# Patient Record
Sex: FEMALE | Race: WHITE | Employment: OTHER | URBAN - METROPOLITAN AREA
[De-identification: names, ages, dates, MRNs, and addresses within clinical notes are randomized per-mention and may not be internally consistent; named-entity substitution may affect disease eponyms.]

---

## 2018-04-17 LAB
CHOLESTEROL, TOTAL: NORMAL
CHOLESTEROL/HDL RATIO: NORMAL
HDLC SERPL-MCNC: NORMAL MG/DL
LDL CHOLESTEROL CALCULATED: NORMAL
TRIGL SERPL-MCNC: NORMAL MG/DL
TSH SERPL DL<=0.05 MIU/L-ACNC: NORMAL M[IU]/L
VLDLC SERPL CALC-MCNC: NORMAL MG/DL

## 2018-06-11 ENCOUNTER — OFFICE VISIT (OUTPATIENT)
Dept: CARDIOLOGY CLINIC | Age: 83
End: 2018-06-11
Payer: MEDICARE

## 2018-06-11 VITALS
SYSTOLIC BLOOD PRESSURE: 118 MMHG | WEIGHT: 206 LBS | HEART RATE: 93 BPM | DIASTOLIC BLOOD PRESSURE: 78 MMHG | BODY MASS INDEX: 32.33 KG/M2 | HEIGHT: 67 IN | RESPIRATION RATE: 16 BRPM

## 2018-06-11 DIAGNOSIS — I48.91 ATRIAL FIBRILLATION, UNSPECIFIED TYPE (HCC): Primary | ICD-10-CM

## 2018-06-11 PROCEDURE — G8400 PT W/DXA NO RESULTS DOC: HCPCS | Performed by: INTERNAL MEDICINE

## 2018-06-11 PROCEDURE — 1036F TOBACCO NON-USER: CPT | Performed by: INTERNAL MEDICINE

## 2018-06-11 PROCEDURE — 1123F ACP DISCUSS/DSCN MKR DOCD: CPT | Performed by: INTERNAL MEDICINE

## 2018-06-11 PROCEDURE — 1090F PRES/ABSN URINE INCON ASSESS: CPT | Performed by: INTERNAL MEDICINE

## 2018-06-11 PROCEDURE — 93000 ELECTROCARDIOGRAM COMPLETE: CPT | Performed by: INTERNAL MEDICINE

## 2018-06-11 PROCEDURE — G8417 CALC BMI ABV UP PARAM F/U: HCPCS | Performed by: INTERNAL MEDICINE

## 2018-06-11 PROCEDURE — 4040F PNEUMOC VAC/ADMIN/RCVD: CPT | Performed by: INTERNAL MEDICINE

## 2018-06-11 PROCEDURE — G8427 DOCREV CUR MEDS BY ELIG CLIN: HCPCS | Performed by: INTERNAL MEDICINE

## 2018-06-11 PROCEDURE — 99213 OFFICE O/P EST LOW 20 MIN: CPT | Performed by: INTERNAL MEDICINE

## 2018-06-11 RX ORDER — EZETIMIBE 10 MG/1
10 TABLET ORAL DAILY
COMMUNITY
End: 2019-05-14 | Stop reason: SDUPTHER

## 2018-06-11 RX ORDER — SIMVASTATIN 40 MG
40 TABLET ORAL NIGHTLY
COMMUNITY
End: 2019-05-14 | Stop reason: SDUPTHER

## 2018-07-31 RX ORDER — METOPROLOL TARTRATE 50 MG/1
75 TABLET, FILM COATED ORAL 2 TIMES DAILY
Qty: 270 TABLET | Refills: 3 | Status: SHIPPED | OUTPATIENT
Start: 2018-07-31 | End: 2019-05-14 | Stop reason: SDUPTHER

## 2019-04-04 LAB
AVERAGE GLUCOSE: NORMAL
CREATININE: 0.9 MG/DL
CREATININE: NORMAL MG/DL
HBA1C MFR BLD: 6 %
POTASSIUM (K+): 4.4

## 2019-04-25 VITALS
DIASTOLIC BLOOD PRESSURE: 76 MMHG | HEIGHT: 67 IN | SYSTOLIC BLOOD PRESSURE: 130 MMHG | BODY MASS INDEX: 32.02 KG/M2 | HEART RATE: 86 BPM | WEIGHT: 204 LBS

## 2019-05-13 PROBLEM — J30.1 SEASONAL ALLERGIC RHINITIS DUE TO POLLEN: Status: ACTIVE | Noted: 2019-05-13

## 2019-05-13 NOTE — PROGRESS NOTES
2019    Shae Velarde (:  1935) is a 82 y.o. female, here for evaluation of the following medical concerns: This is patient who had an extrinsic fall and fractured her left humerus. Patient since that time complaining of thoracic spine pain. She had this exact same discomfort the last time we did do a thoracic spine film indicating some narrowing and arthritic changes of the level of T8. This does match her level of discomfort. It's worse when she standing and she is stooping over the counter. She states it hurts so much that she's been unable to clock or even work at the counter because of pain. She denies pain radiating to the flank or into the abdominal area. Patient's blood pressure is well controlled. She does use chronic narcotic analgesics and very minimal amounts in order for her to do activities of daily living. The patient's atrial fibrillation is well controlled. Rate is well controlled. She is on Eliquis and has tolerated this medication well. He denies any bleeding or bruising but she does have a hematoma involving her left arm. This appears to be diminished in size compared to previous. No other bleeding or bruising. She denies any strokelike symptoms. Review of Systems   Constitutional: Negative. HENT: Negative. Eyes: Negative. Respiratory: Negative. Cardiovascular:        Atrial fibrillation without evidence of rapid ventricular response. Denies orthopnea PND or other symptoms consistent with congestive heart failure. Gastrointestinal: Negative. Endocrine: Negative. Glucose intolerance. Last hemoglobin A1c was 6.0. Genitourinary: Negative. Musculoskeletal:        Thoracic spine pain. Allergic/Immunologic: Negative. Neurological: Negative. Hematological: Negative. Psychiatric/Behavioral: Negative.       Problem List: Malaise and fatigue, Essential hypertension, Hyperlipidemia  Health Maintenance:  Colonoscopy - (2014)  Bone 7.5-325 MG per tablet Take 1 tablet by mouth every 6 hours as needed for Pain for up to 30 days. Yes Jerzy Goodrich MD   fluticasone (FLONASE) 50 MCG/ACT nasal spray 1 spray by Nasal route as needed. Yes Historical Provider, MD   Multiple Vitamins-Minerals (THERAPEUTIC MULTIVITAMIN-MINERALS) tablet Take 1 tablet by mouth daily. Yes Historical Provider, MD   Calcium Carb-Cholecalciferol (CALCIUM + D3) 600-200 MG-UNIT TABS Take 1 tablet by mouth daily.  Yes Historical Provider, MD        Allergies   Allergen Reactions    Amoxicillin-Pot Clavulanate     Crestor [Rosuvastatin] Other (See Comments)     Muscle fatigue    Lipitor [Atorvastatin] Other (See Comments)     Muscle fatigue    Sulfa Antibiotics Hives       Past Medical History:   Diagnosis Date    Anticoagulant long-term use     Atrial fibrillation ([facility] [address]     Hepatitis A     Hyperlipidemia     Hypertension     Spinal stenosis        Past Surgical History:   Procedure Laterality Date    [facility]      [facility]    APPENDECTOMY      CARDIOVERSION  10/28/2014    SPINE SURGERY  10/2014    TONSILLECTOMY         Social History     Socioeconomic History    Marital status:      Spouse name: Not on file    Number of children: Not on file    Years of education: Not on file    Highest education level: Not on file   Occupational History    Not on file   Social Needs    Financial resource strain: Not on file    Food insecurity:     Worry: Not on file     Inability: Not on file    Transportation needs:     Medical: Not on file     Non-medical: Not on file   Tobacco Use    Smoking status: Former Smoker     Packs/day: 1.00     Years: 15.00     Pack years: 15.00     Types: Cigarettes     Last attempt to quit: 1990     Years since quittin.5    Smokeless tobacco: Never Used   Substance and Sexual Activity    Alcohol use: Yes     Comment: rarely    Drug use: No    Sexual activity: Not on file   Lifestyle    Physical activity:     Days per week: Not on file     Minutes per session: Not on file    Stress: Not on file   Relationships    Social connections:     Talks on phone: Not on file     Gets together: Not on file     Attends Taoist service: Not on file     Active member of club or organization: Not on file     Attends meetings of clubs or organizations: Not on file     Relationship status: Not on file    Intimate partner violence:     Fear of current or ex partner: Not on file     Emotionally abused: Not on file     Physically abused: Not on file     Forced sexual activity: Not on file   Other Topics Concern    Not on file   Social History Narrative    Not on file        Family History   Problem Relation Age of Onset    Stroke Mother     Heart Disease Father        Vitals:    05/14/19 1358   BP: 136/74   Pulse: 88   SpO2: 95%   Weight: 206 lb (93.4 kg)   Height: 5' 8\" (1.727 m)     Estimated body mass index is 31.32 kg/m² as calculated from the following:    Height as of this encounter: 5' 8\" (1.727 m). Weight as of this encounter: 206 lb (93.4 kg). Physical Exam  Const: Appears healthy, well developed and well nourished. Appears obese. Eyes: EOMI in both eyes. PERRL. ENMT: External ears WNL. Tympanic membranes are intact. External nose WNL. Moistness and normal color of the  nasal mucosae. Septum is in the midline. Dentition is in good repair. Gums appear healthy. Posterior pharynx  shows no exudate, irritation or redness. Neck: Supple and symmetric. Palpation reveals no adenopathy. No masses appreciated. Thyroid: no nodule  appreciated or thyromegaly. No jugular venous distention. Resp: Respirations are unlabored. Respiration rate is normal. Auscultate good airflow. No rales, rhonchi or wheezes  appreciated over the lungs bilaterally. CV: Rhythm is regular. S1 is normal. S2 is normal. Carotids: no bruits. Abdominal aorta: not palpable. Pedal  pulses: 2+ and equal bilaterally.  Extremities: No clubbing,

## 2019-05-14 ENCOUNTER — OFFICE VISIT (OUTPATIENT)
Dept: FAMILY MEDICINE CLINIC | Age: 84
End: 2019-05-14
Payer: MEDICARE

## 2019-05-14 VITALS
WEIGHT: 206 LBS | SYSTOLIC BLOOD PRESSURE: 136 MMHG | HEIGHT: 68 IN | OXYGEN SATURATION: 95 % | HEART RATE: 88 BPM | DIASTOLIC BLOOD PRESSURE: 74 MMHG | BODY MASS INDEX: 31.22 KG/M2

## 2019-05-14 DIAGNOSIS — I48.20 CHRONIC ATRIAL FIBRILLATION (HCC): ICD-10-CM

## 2019-05-14 DIAGNOSIS — I10 ESSENTIAL HYPERTENSION: ICD-10-CM

## 2019-05-14 DIAGNOSIS — J30.1 SEASONAL ALLERGIC RHINITIS DUE TO POLLEN: ICD-10-CM

## 2019-05-14 DIAGNOSIS — M54.6 THORACIC SPINE PAIN: Primary | ICD-10-CM

## 2019-05-14 DIAGNOSIS — E78.2 MIXED HYPERLIPIDEMIA: ICD-10-CM

## 2019-05-14 PROCEDURE — 99214 OFFICE O/P EST MOD 30 MIN: CPT | Performed by: INTERNAL MEDICINE

## 2019-05-14 RX ORDER — SIMVASTATIN 40 MG
40 TABLET ORAL NIGHTLY
Qty: 90 TABLET | Refills: 1 | Status: SHIPPED | OUTPATIENT
Start: 2019-05-14 | End: 2019-12-13 | Stop reason: SDUPTHER

## 2019-05-14 RX ORDER — HYDROCODONE BITARTRATE AND ACETAMINOPHEN 7.5; 325 MG/1; MG/1
1 TABLET ORAL EVERY 6 HOURS PRN
Qty: 120 TABLET | Refills: 0 | Status: SHIPPED | OUTPATIENT
Start: 2019-05-14 | End: 2019-06-13

## 2019-05-14 RX ORDER — LISINOPRIL 40 MG/1
40 TABLET ORAL DAILY
Qty: 90 TABLET | Refills: 1 | Status: SHIPPED | OUTPATIENT
Start: 2019-05-14 | End: 2019-08-07 | Stop reason: SDUPTHER

## 2019-05-14 RX ORDER — EZETIMIBE 10 MG/1
10 TABLET ORAL DAILY
Qty: 90 TABLET | Refills: 1 | Status: SHIPPED | OUTPATIENT
Start: 2019-05-14 | End: 2019-11-20 | Stop reason: SDUPTHER

## 2019-05-14 RX ORDER — METOPROLOL TARTRATE 50 MG/1
75 TABLET, FILM COATED ORAL 2 TIMES DAILY
Qty: 270 TABLET | Refills: 3 | Status: SHIPPED | OUTPATIENT
Start: 2019-05-14 | End: 2019-07-24 | Stop reason: SDUPTHER

## 2019-05-14 ASSESSMENT — ENCOUNTER SYMPTOMS
EYES NEGATIVE: 1
ALLERGIC/IMMUNOLOGIC NEGATIVE: 1
GASTROINTESTINAL NEGATIVE: 1
RESPIRATORY NEGATIVE: 1

## 2019-06-19 NOTE — PROGRESS NOTES
significant pauses. Occasional PVC's. 18. OP BAO guided DCCV, 05/29/2015. Successful conversion of AF to sinus mechanism. 19. OP cardiac evaluation, 10/06/2015. Atrial fibrillation with ventricular response, 96 per minute. 20. OP cardiac evaluation, 04/26/2016, fatigue and dyspnea. Possible depression. Atrial fibrillation with ventricular response 100. Holter monitor to be obtained. 21. 24-hour Holter monitor, 04/29/2016. AF. Average ventricular response 92 bpm.  No prolonged pauses. 22. OP cardiac assessment, 10/25/2016. Asymptomatic. Using apixaban. 23. Ultrasound showed carotid on the left 100% stenosis (per patient).     Review of Systems:  Constitutional: negative for fever and chills  Respiratory: negative for cough and hemoptysis  Cardiovascular:   Gastrointestinal: negative for abdominal pain, diarrhea, nausea and vomiting  Genitourinary:negative for dysuria and hematuria  Derm: negative for rash and skin lesion(s)  Neurological: negative for seizures and tremors  Endocrine: negative for diabetic symptoms including polydipsia and polyuria  Musculoskeletal: negative for CTD  Psychiatric: negative for psychosis and major depression    On examination, she is a mildly obese, elderly,  female in no distress. Skin is warm and dry. Respirations are unlabored. /86   Pulse 92   Resp 16   Ht 5' 7\" (1.702 m)   Wt 210 lb (95.3 kg)   BMI 32.89 kg/m² . HEENT negative for scleral icterus. Extraocular muscles intact. No facial asymmetry or central cyanosis. Neck without masses or goiter. No bruit or JVD. Cardiac apex not displaced. Rhythm regular. Abdomen normal.  Extremities without edema. EKG today shows atrial fibrillation with ventricular response 93/min. Otherwise normal EKG. She has had chronic fatigue and decreased exercise capacity. She is at high risk for ischemic heart disease. A Lexiscan perfusion study will be obtained.   In view of her risk

## 2019-07-03 ENCOUNTER — OFFICE VISIT (OUTPATIENT)
Dept: CARDIOLOGY CLINIC | Age: 84
End: 2019-07-03
Payer: MEDICARE

## 2019-07-03 VITALS
RESPIRATION RATE: 16 BRPM | SYSTOLIC BLOOD PRESSURE: 138 MMHG | HEIGHT: 67 IN | WEIGHT: 210 LBS | HEART RATE: 92 BPM | BODY MASS INDEX: 32.96 KG/M2 | DIASTOLIC BLOOD PRESSURE: 86 MMHG

## 2019-07-03 DIAGNOSIS — I48.91 ATRIAL FIBRILLATION, UNSPECIFIED TYPE (HCC): Primary | ICD-10-CM

## 2019-07-03 DIAGNOSIS — R94.31 EKG ABNORMALITIES: ICD-10-CM

## 2019-07-03 DIAGNOSIS — R06.02 SOB (SHORTNESS OF BREATH): Primary | ICD-10-CM

## 2019-07-03 PROCEDURE — G8400 PT W/DXA NO RESULTS DOC: HCPCS | Performed by: INTERNAL MEDICINE

## 2019-07-03 PROCEDURE — 93000 ELECTROCARDIOGRAM COMPLETE: CPT | Performed by: INTERNAL MEDICINE

## 2019-07-03 PROCEDURE — 1090F PRES/ABSN URINE INCON ASSESS: CPT | Performed by: INTERNAL MEDICINE

## 2019-07-03 PROCEDURE — G8427 DOCREV CUR MEDS BY ELIG CLIN: HCPCS | Performed by: INTERNAL MEDICINE

## 2019-07-03 PROCEDURE — 1123F ACP DISCUSS/DSCN MKR DOCD: CPT | Performed by: INTERNAL MEDICINE

## 2019-07-03 PROCEDURE — 99213 OFFICE O/P EST LOW 20 MIN: CPT | Performed by: INTERNAL MEDICINE

## 2019-07-03 PROCEDURE — 1036F TOBACCO NON-USER: CPT | Performed by: INTERNAL MEDICINE

## 2019-07-03 PROCEDURE — G8417 CALC BMI ABV UP PARAM F/U: HCPCS | Performed by: INTERNAL MEDICINE

## 2019-07-03 PROCEDURE — 4040F PNEUMOC VAC/ADMIN/RCVD: CPT | Performed by: INTERNAL MEDICINE

## 2019-07-03 RX ORDER — ACETAMINOPHEN 160 MG
TABLET,DISINTEGRATING ORAL DAILY
COMMUNITY

## 2019-07-08 ENCOUNTER — TELEPHONE (OUTPATIENT)
Dept: CARDIOLOGY | Age: 84
End: 2019-07-08

## 2019-07-09 ENCOUNTER — HOSPITAL ENCOUNTER (OUTPATIENT)
Dept: CARDIOLOGY | Age: 84
Discharge: HOME | End: 2019-07-09
Payer: MEDICARE

## 2019-07-09 VITALS
WEIGHT: 210 LBS | SYSTOLIC BLOOD PRESSURE: 130 MMHG | HEART RATE: 94 BPM | BODY MASS INDEX: 32.96 KG/M2 | DIASTOLIC BLOOD PRESSURE: 84 MMHG | HEIGHT: 67 IN

## 2019-07-09 DIAGNOSIS — R06.02 SOB (SHORTNESS OF BREATH): ICD-10-CM

## 2019-07-09 DIAGNOSIS — R06.02 SHORTNESS OF BREATH: Primary | ICD-10-CM

## 2019-07-09 DIAGNOSIS — R94.31 EKG ABNORMALITIES: ICD-10-CM

## 2019-07-09 PROCEDURE — 78452 HT MUSCLE IMAGE SPECT MULT: CPT

## 2019-07-09 PROCEDURE — 93017 CV STRESS TEST TRACING ONLY: CPT

## 2019-07-09 PROCEDURE — 2580000003 HC RX 258: Performed by: INTERNAL MEDICINE

## 2019-07-09 PROCEDURE — 6360000002 HC RX W HCPCS: Performed by: INTERNAL MEDICINE

## 2019-07-09 PROCEDURE — A9500 TC99M SESTAMIBI: HCPCS | Performed by: INTERNAL MEDICINE

## 2019-07-09 PROCEDURE — 3430000000 HC RX DIAGNOSTIC RADIOPHARMACEUTICAL: Performed by: INTERNAL MEDICINE

## 2019-07-09 RX ORDER — SODIUM CHLORIDE 0.9 % (FLUSH) 0.9 %
10 SYRINGE (ML) INJECTION PRN
Status: DISCONTINUED | OUTPATIENT
Start: 2019-07-09 | End: 2019-07-10 | Stop reason: HOSPADM

## 2019-07-09 RX ADMIN — Medication 10.1 MILLICURIE: at 08:11

## 2019-07-09 RX ADMIN — Medication 32.8 MILLICURIE: at 09:35

## 2019-07-09 RX ADMIN — REGADENOSON 0.4 MG: 0.08 INJECTION, SOLUTION INTRAVENOUS at 09:36

## 2019-07-09 RX ADMIN — Medication 10 ML: at 09:35

## 2019-07-09 RX ADMIN — Medication 10 ML: at 08:11

## 2019-07-09 RX ADMIN — Medication 10 ML: at 09:36

## 2019-07-18 ENCOUNTER — TELEPHONE (OUTPATIENT)
Dept: CARDIOLOGY CLINIC | Age: 84
End: 2019-07-18

## 2019-07-24 RX ORDER — METOPROLOL TARTRATE 50 MG/1
75 TABLET, FILM COATED ORAL 2 TIMES DAILY
Qty: 270 TABLET | Refills: 3 | Status: SHIPPED
Start: 2019-07-24 | End: 2020-07-10 | Stop reason: SDUPTHER

## 2019-08-07 ENCOUNTER — OFFICE VISIT (OUTPATIENT)
Dept: FAMILY MEDICINE CLINIC | Age: 84
End: 2019-08-07
Payer: MEDICARE

## 2019-08-07 VITALS
BODY MASS INDEX: 32.65 KG/M2 | OXYGEN SATURATION: 92 % | WEIGHT: 208 LBS | HEART RATE: 80 BPM | HEIGHT: 67 IN | SYSTOLIC BLOOD PRESSURE: 128 MMHG | DIASTOLIC BLOOD PRESSURE: 80 MMHG

## 2019-08-07 DIAGNOSIS — R10.32 LEFT LOWER QUADRANT PAIN: ICD-10-CM

## 2019-08-07 DIAGNOSIS — F32.9 REACTIVE DEPRESSION: ICD-10-CM

## 2019-08-07 DIAGNOSIS — I10 ESSENTIAL HYPERTENSION: ICD-10-CM

## 2019-08-07 DIAGNOSIS — I48.20 CHRONIC ATRIAL FIBRILLATION (HCC): ICD-10-CM

## 2019-08-07 DIAGNOSIS — J30.1 SEASONAL ALLERGIC RHINITIS DUE TO POLLEN: ICD-10-CM

## 2019-08-07 DIAGNOSIS — M19.90 ARTHRITIS: ICD-10-CM

## 2019-08-07 DIAGNOSIS — M54.6 THORACIC SPINE PAIN: Primary | ICD-10-CM

## 2019-08-07 DIAGNOSIS — E78.2 MIXED HYPERLIPIDEMIA: ICD-10-CM

## 2019-08-07 PROCEDURE — 99214 OFFICE O/P EST MOD 30 MIN: CPT | Performed by: INTERNAL MEDICINE

## 2019-08-07 RX ORDER — HYDROCODONE BITARTRATE AND ACETAMINOPHEN 7.5; 325 MG/1; MG/1
1 TABLET ORAL EVERY 6 HOURS PRN
COMMUNITY
End: 2019-08-07 | Stop reason: SDUPTHER

## 2019-08-07 RX ORDER — HYDROCODONE BITARTRATE AND ACETAMINOPHEN 7.5; 325 MG/1; MG/1
1 TABLET ORAL EVERY 6 HOURS PRN
Qty: 120 TABLET | Refills: 0 | Status: SHIPPED | OUTPATIENT
Start: 2019-08-07 | End: 2019-09-06

## 2019-08-07 RX ORDER — LISINOPRIL 40 MG/1
40 TABLET ORAL DAILY
Qty: 90 TABLET | Refills: 1 | Status: SHIPPED | OUTPATIENT
Start: 2019-08-07 | End: 2019-12-13 | Stop reason: SDUPTHER

## 2019-08-07 RX ORDER — HYDROCODONE BITARTRATE AND ACETAMINOPHEN 7.5; 325 MG/1; MG/1
1 TABLET ORAL EVERY 6 HOURS PRN
Qty: 120 TABLET | Refills: 0 | Status: SHIPPED | OUTPATIENT
Start: 2019-10-07 | End: 2019-09-04 | Stop reason: SDUPTHER

## 2019-08-07 RX ORDER — MIRTAZAPINE 15 MG/1
15 TABLET, FILM COATED ORAL NIGHTLY
Qty: 30 TABLET | Refills: 3 | Status: SHIPPED | OUTPATIENT
Start: 2019-08-07 | End: 2019-10-14 | Stop reason: ALTCHOICE

## 2019-08-07 RX ORDER — HYDROCODONE BITARTRATE AND ACETAMINOPHEN 7.5; 325 MG/1; MG/1
1 TABLET ORAL EVERY 6 HOURS PRN
Qty: 120 TABLET | Refills: 0 | Status: SHIPPED | OUTPATIENT
Start: 2019-09-07 | End: 2019-10-04 | Stop reason: CLARIF

## 2019-08-07 ASSESSMENT — ENCOUNTER SYMPTOMS
RESPIRATORY NEGATIVE: 1
ALLERGIC/IMMUNOLOGIC NEGATIVE: 1
EYES NEGATIVE: 1

## 2019-08-07 NOTE — PROGRESS NOTES
2019    Boo Graham (:  1935) is a 82 y.o. female, here for evaluation of the following medical concerns: This is a patient with multiple issues today. Patient is complaining of left lower quadrant abdominal pain. Patient denies any change in bowel habits. Patient did have fairly extensive lumbar surgery previously. She is now having thoracic spine pain. She was evaluated by Dr. Irene Vuong and was given several epidurals. Knee none of this is been very helpful. She was placed in the physical therapy and that seems to be helping somewhat. Patient is having difficulty with insomnia at night she also is complaining of symptoms of depression. She lost a good friend with the last several years and this is really been quite affecting. Review of Systems   Constitutional: Negative. HENT: Negative. Eyes: Negative. Respiratory: Negative. Cardiovascular:        Atrial fibrillation without evidence of rapid ventricular response. Denies orthopnea PND or other symptoms consistent with congestive heart failure. Gastrointestinal:        Left lower quadrant pain   Endocrine: Negative. Glucose intolerance. Last hemoglobin A1c was 6.0. Genitourinary: Negative. Musculoskeletal:        Thoracic spine pain. Allergic/Immunologic: Negative. Neurological: Negative. Hematological: Negative. Psychiatric/Behavioral:        Depression. Insomnia.      Problem List: Malaise and fatigue, Essential hypertension, Hyperlipidemia  Health Maintenance:  Colonoscopy - (2014)  Bone Density Test Screening - (2009)  Couseled on Home Safety - (10/12/2015)  Influenza Vaccination - (2015)  Influenza Vaccination - (2017)  Tdap - (2017) DISCUSSED  Zoster/Shingles Vaccine - (2017) DISCUSSED  Shingrix Vaccine (Shingles) - (2018) SLIP GIVEN  Medical Problems:  Fly Hernandez  1935 Page #2  Hypertension, Hyperlipidemia  Atrial Fibrillation - (10/2014) partner violence:     Fear of current or ex partner: Not on file     Emotionally abused: Not on file     Physically abused: Not on file     Forced sexual activity: Not on file   Other Topics Concern    Not on file   Social History Narrative    Not on file        Family History   Problem Relation Age of Onset    Stroke Mother     Heart Disease Father     Heart Disease Brother        There were no vitals filed for this visit. Estimated body mass index is 32.89 kg/m² as calculated from the following:    Height as of 7/9/19: 5' 7\" (1.702 m). Weight as of 7/9/19: 210 lb (95.3 kg). Physical Exam  Const: Appears healthy, well developed and well nourished. Appears obese. Eyes: EOMI in both eyes. PERRL. ENMT: External ears WNL. Tympanic membranes are intact. External nose WNL. Moistness and normal color of the  nasal mucosae. Septum is in the midline. Dentition is in good repair. Gums appear healthy. Posterior pharynx  shows no exudate, irritation or redness. Neck: Supple and symmetric. Palpation reveals no adenopathy. No masses appreciated. Thyroid: no nodule  appreciated or thyromegaly. No jugular venous distention. Resp: Respirations are unlabored. Respiration rate is normal. Auscultate good airflow. No rales, rhonchi or wheezes  appreciated over the lungs bilaterally. CV: Rhythm is regular. S1 is normal. S2 is normal. Carotids: no bruits. Abdominal aorta: not palpable. Pedal  pulses: 2+ and equal bilaterally. Extremities: No clubbing, cyanosis or edema. Abdomen: Bowel sounds are normoactive. Palpation reveals softness, with no distension, organomegaly or  tenderness. No abdominal masses palpable. No palpable hepatosplenomegaly. Musculo: Walks with a limping gait. Upper Extremities: ROM: Significant limitation in range of motion of the left  shoulder. Hematoma decreased in size from previous visit. Lower Extremities: ROM: Crepitus bilaterally more extensive on the left.   Skin: Dry and warm with no

## 2019-08-15 ENCOUNTER — TELEPHONE (OUTPATIENT)
Dept: FAMILY MEDICINE CLINIC | Age: 84
End: 2019-08-15

## 2019-08-21 DIAGNOSIS — R10.32 LEFT LOWER QUADRANT PAIN: ICD-10-CM

## 2019-09-04 ENCOUNTER — OFFICE VISIT (OUTPATIENT)
Dept: FAMILY MEDICINE CLINIC | Age: 84
End: 2019-09-04
Payer: MEDICARE

## 2019-09-04 VITALS — DIASTOLIC BLOOD PRESSURE: 80 MMHG | OXYGEN SATURATION: 94 % | SYSTOLIC BLOOD PRESSURE: 134 MMHG | HEART RATE: 100 BPM

## 2019-09-04 DIAGNOSIS — M19.90 ARTHRITIS: ICD-10-CM

## 2019-09-04 DIAGNOSIS — I50.9 CHRONIC CONGESTIVE HEART FAILURE, UNSPECIFIED HEART FAILURE TYPE (HCC): Primary | ICD-10-CM

## 2019-09-04 PROCEDURE — 99214 OFFICE O/P EST MOD 30 MIN: CPT | Performed by: INTERNAL MEDICINE

## 2019-09-04 RX ORDER — HYDROCODONE BITARTRATE AND ACETAMINOPHEN 7.5; 325 MG/1; MG/1
1 TABLET ORAL EVERY 6 HOURS PRN
Qty: 120 TABLET | Refills: 0 | Status: SHIPPED | OUTPATIENT
Start: 2019-09-04 | End: 2019-10-04

## 2019-09-04 RX ORDER — HYDROCHLOROTHIAZIDE 25 MG/1
25 TABLET ORAL EVERY MORNING
Qty: 30 TABLET | Refills: 5 | Status: SHIPPED | OUTPATIENT
Start: 2019-09-04 | End: 2019-11-12 | Stop reason: ALTCHOICE

## 2019-09-04 ASSESSMENT — ENCOUNTER SYMPTOMS
RESPIRATORY NEGATIVE: 1
ALLERGIC/IMMUNOLOGIC NEGATIVE: 1
EYES NEGATIVE: 1

## 2019-09-04 NOTE — PROGRESS NOTES
2019    Martín Hernandez (:  1935) is a 82 y.o. female, here for evaluation of the following medical concerns:    Patient recently was evaluated by Dr. Darrick Hernandez for shortness of breath. Patient is describing what sounded like orthopnea and PND at nighttime. She was thinking she was having panic attacks. I gave her Remeron but she only took 1 dose because she states that it actually made her feel worse. We did discuss some depression symptoms today and the patient does not wish to pursue counseling. Patient denies chest pain or pressure with exertion. Patient's brain natruretic peptide is elevated. I did review the stress test that was done in the summertime indicating no evidence of ischemia or infarct. Hypertension     Hyperlipidemia           Review of Systems   Constitutional: Negative. HENT: Negative. Eyes: Negative. Respiratory: Negative. Cardiovascular:        Atrial fibrillation without evidence of rapid ventricular response. Denies orthopnea PND or other symptoms consistent with congestive heart failure. Gastrointestinal:        Left lower quadrant pain   Endocrine: Negative. Glucose intolerance. Last hemoglobin A1c was 6.0. Genitourinary: Negative. Musculoskeletal:        Thoracic spine pain. Allergic/Immunologic: Negative. Neurological: Negative. Hematological: Negative. Psychiatric/Behavioral:        Depression. Insomnia.      Problem List: Malaise and fatigue, Essential hypertension, Hyperlipidemia  Health Maintenance:  Colonoscopy - (2014)  Bone Density Test Screening - (2009)  Couseled on Home Safety - (10/12/2015)  Influenza Vaccination - (2015)  Influenza Vaccination - (2017)  Tdap - (2017) DISCUSSED  Zoster/Shingles Vaccine - (2017) DISCUSSED  Shingrix Vaccine (Shingles) - (2018) SLIP GIVEN  Medical Problems:  Martín Hernandez  1935 Page #2  Hypertension, Hyperlipidemia  Atrial Fibrillation -

## 2019-10-04 ENCOUNTER — HOSPITAL ENCOUNTER (OUTPATIENT)
Age: 84
Discharge: HOME | End: 2019-10-06
Payer: MEDICARE

## 2019-10-04 ENCOUNTER — OFFICE VISIT (OUTPATIENT)
Dept: FAMILY MEDICINE CLINIC | Age: 84
End: 2019-10-04
Payer: MEDICARE

## 2019-10-04 VITALS — OXYGEN SATURATION: 95 % | SYSTOLIC BLOOD PRESSURE: 132 MMHG | HEART RATE: 82 BPM | DIASTOLIC BLOOD PRESSURE: 70 MMHG

## 2019-10-04 DIAGNOSIS — I10 ESSENTIAL HYPERTENSION: ICD-10-CM

## 2019-10-04 DIAGNOSIS — I50.9 CHRONIC CONGESTIVE HEART FAILURE, UNSPECIFIED HEART FAILURE TYPE (HCC): ICD-10-CM

## 2019-10-04 DIAGNOSIS — E78.2 MIXED HYPERLIPIDEMIA: ICD-10-CM

## 2019-10-04 DIAGNOSIS — I48.20 CHRONIC ATRIAL FIBRILLATION (HCC): Primary | ICD-10-CM

## 2019-10-04 LAB
ALBUMIN SERPL-MCNC: 4.5 G/DL (ref 3.5–5.2)
ALP BLD-CCNC: 62 U/L (ref 35–104)
ALT SERPL-CCNC: 14 U/L (ref 0–32)
ANION GAP SERPL CALCULATED.3IONS-SCNC: 9 MMOL/L (ref 7–16)
AST SERPL-CCNC: 17 U/L (ref 0–31)
BILIRUB SERPL-MCNC: 0.6 MG/DL (ref 0–1.2)
BUN BLDV-MCNC: 20 MG/DL (ref 8–23)
CALCIUM SERPL-MCNC: 10.2 MG/DL (ref 8.6–10.2)
CHLORIDE BLD-SCNC: 104 MMOL/L (ref 98–107)
CO2: 29 MMOL/L (ref 22–29)
CREAT SERPL-MCNC: 1.1 MG/DL (ref 0.5–1)
GFR AFRICAN AMERICAN: 58
GFR NON-AFRICAN AMERICAN: 48 ML/MIN/1.73
GLUCOSE BLD-MCNC: 129 MG/DL (ref 74–99)
POTASSIUM SERPL-SCNC: 4.5 MMOL/L (ref 3.5–5)
PRO-BNP: 1999 PG/ML (ref 0–450)
SODIUM BLD-SCNC: 142 MMOL/L (ref 132–146)
TOTAL PROTEIN: 7.8 G/DL (ref 6.4–8.3)

## 2019-10-04 PROCEDURE — G8482 FLU IMMUNIZE ORDER/ADMIN: HCPCS | Performed by: INTERNAL MEDICINE

## 2019-10-04 PROCEDURE — 80053 COMPREHEN METABOLIC PANEL: CPT

## 2019-10-04 PROCEDURE — 4040F PNEUMOC VAC/ADMIN/RCVD: CPT | Performed by: INTERNAL MEDICINE

## 2019-10-04 PROCEDURE — G8400 PT W/DXA NO RESULTS DOC: HCPCS | Performed by: INTERNAL MEDICINE

## 2019-10-04 PROCEDURE — 99214 OFFICE O/P EST MOD 30 MIN: CPT | Performed by: INTERNAL MEDICINE

## 2019-10-04 PROCEDURE — 36415 COLL VENOUS BLD VENIPUNCTURE: CPT

## 2019-10-04 PROCEDURE — 1090F PRES/ABSN URINE INCON ASSESS: CPT | Performed by: INTERNAL MEDICINE

## 2019-10-04 PROCEDURE — 83880 ASSAY OF NATRIURETIC PEPTIDE: CPT

## 2019-10-04 PROCEDURE — G8417 CALC BMI ABV UP PARAM F/U: HCPCS | Performed by: INTERNAL MEDICINE

## 2019-10-04 PROCEDURE — 90653 IIV ADJUVANT VACCINE IM: CPT | Performed by: INTERNAL MEDICINE

## 2019-10-04 PROCEDURE — 1123F ACP DISCUSS/DSCN MKR DOCD: CPT | Performed by: INTERNAL MEDICINE

## 2019-10-04 PROCEDURE — G0008 ADMIN INFLUENZA VIRUS VAC: HCPCS | Performed by: INTERNAL MEDICINE

## 2019-10-04 PROCEDURE — G8427 DOCREV CUR MEDS BY ELIG CLIN: HCPCS | Performed by: INTERNAL MEDICINE

## 2019-10-04 PROCEDURE — 1036F TOBACCO NON-USER: CPT | Performed by: INTERNAL MEDICINE

## 2019-10-04 ASSESSMENT — ENCOUNTER SYMPTOMS
RESPIRATORY NEGATIVE: 1
EYES NEGATIVE: 1
ALLERGIC/IMMUNOLOGIC NEGATIVE: 1

## 2019-10-07 ENCOUNTER — TELEPHONE (OUTPATIENT)
Dept: ADMINISTRATIVE | Age: 84
End: 2019-10-07

## 2019-10-11 ENCOUNTER — OFFICE VISIT (OUTPATIENT)
Dept: CARDIOLOGY CLINIC | Age: 84
End: 2019-10-11
Payer: MEDICARE

## 2019-10-11 VITALS
SYSTOLIC BLOOD PRESSURE: 138 MMHG | WEIGHT: 211 LBS | HEIGHT: 67 IN | BODY MASS INDEX: 33.12 KG/M2 | HEART RATE: 99 BPM | DIASTOLIC BLOOD PRESSURE: 86 MMHG | RESPIRATION RATE: 14 BRPM

## 2019-10-11 DIAGNOSIS — I50.42 CHRONIC COMBINED SYSTOLIC AND DIASTOLIC CONGESTIVE HEART FAILURE (HCC): ICD-10-CM

## 2019-10-11 DIAGNOSIS — R06.02 SHORTNESS OF BREATH: ICD-10-CM

## 2019-10-11 DIAGNOSIS — I50.42 CHRONIC COMBINED SYSTOLIC AND DIASTOLIC HEART FAILURE (HCC): ICD-10-CM

## 2019-10-11 DIAGNOSIS — I48.20 CHRONIC ATRIAL FIBRILLATION (HCC): Primary | ICD-10-CM

## 2019-10-11 PROCEDURE — 1090F PRES/ABSN URINE INCON ASSESS: CPT | Performed by: INTERNAL MEDICINE

## 2019-10-11 PROCEDURE — G8427 DOCREV CUR MEDS BY ELIG CLIN: HCPCS | Performed by: INTERNAL MEDICINE

## 2019-10-11 PROCEDURE — 99213 OFFICE O/P EST LOW 20 MIN: CPT | Performed by: INTERNAL MEDICINE

## 2019-10-11 PROCEDURE — G8400 PT W/DXA NO RESULTS DOC: HCPCS | Performed by: INTERNAL MEDICINE

## 2019-10-11 PROCEDURE — 4040F PNEUMOC VAC/ADMIN/RCVD: CPT | Performed by: INTERNAL MEDICINE

## 2019-10-11 PROCEDURE — 1123F ACP DISCUSS/DSCN MKR DOCD: CPT | Performed by: INTERNAL MEDICINE

## 2019-10-11 PROCEDURE — 93000 ELECTROCARDIOGRAM COMPLETE: CPT | Performed by: INTERNAL MEDICINE

## 2019-10-11 PROCEDURE — G8417 CALC BMI ABV UP PARAM F/U: HCPCS | Performed by: INTERNAL MEDICINE

## 2019-10-11 PROCEDURE — G8482 FLU IMMUNIZE ORDER/ADMIN: HCPCS | Performed by: INTERNAL MEDICINE

## 2019-10-11 PROCEDURE — 1036F TOBACCO NON-USER: CPT | Performed by: INTERNAL MEDICINE

## 2019-10-14 PROBLEM — I50.42 CHRONIC COMBINED SYSTOLIC AND DIASTOLIC HEART FAILURE (HCC): Status: ACTIVE | Noted: 2019-10-14

## 2019-11-01 ENCOUNTER — TELEPHONE (OUTPATIENT)
Dept: CARDIOLOGY | Age: 84
End: 2019-11-01

## 2019-11-06 ENCOUNTER — HOSPITAL ENCOUNTER (OUTPATIENT)
Dept: CARDIOLOGY | Age: 84
Discharge: HOME | End: 2019-11-06
Payer: MEDICARE

## 2019-11-06 DIAGNOSIS — I50.42 CHRONIC COMBINED SYSTOLIC AND DIASTOLIC CONGESTIVE HEART FAILURE (HCC): ICD-10-CM

## 2019-11-06 DIAGNOSIS — R06.02 SHORTNESS OF BREATH: ICD-10-CM

## 2019-11-06 LAB
LV EF: 60 %
LVEF MODALITY: NORMAL

## 2019-11-06 PROCEDURE — 93306 TTE W/DOPPLER COMPLETE: CPT | Performed by: PSYCHIATRY & NEUROLOGY

## 2019-11-12 RX ORDER — FUROSEMIDE 20 MG/1
20 TABLET ORAL 2 TIMES DAILY
Qty: 60 TABLET | Refills: 5 | Status: SHIPPED
Start: 2019-11-12 | End: 2020-03-06

## 2019-11-20 RX ORDER — EZETIMIBE 10 MG/1
10 TABLET ORAL DAILY
Qty: 90 TABLET | Refills: 0 | Status: SHIPPED | OUTPATIENT
Start: 2019-11-20 | End: 2019-12-13 | Stop reason: SDUPTHER

## 2019-12-03 ENCOUNTER — OFFICE VISIT (OUTPATIENT)
Dept: CARDIOLOGY CLINIC | Age: 84
End: 2019-12-03
Payer: MEDICARE

## 2019-12-03 VITALS
DIASTOLIC BLOOD PRESSURE: 72 MMHG | HEIGHT: 67 IN | BODY MASS INDEX: 33.71 KG/M2 | SYSTOLIC BLOOD PRESSURE: 126 MMHG | WEIGHT: 214.8 LBS | HEART RATE: 84 BPM | RESPIRATION RATE: 16 BRPM

## 2019-12-03 DIAGNOSIS — I48.20 CHRONIC ATRIAL FIBRILLATION (HCC): Primary | ICD-10-CM

## 2019-12-03 PROCEDURE — 1036F TOBACCO NON-USER: CPT | Performed by: INTERNAL MEDICINE

## 2019-12-03 PROCEDURE — 99213 OFFICE O/P EST LOW 20 MIN: CPT | Performed by: INTERNAL MEDICINE

## 2019-12-03 PROCEDURE — 93000 ELECTROCARDIOGRAM COMPLETE: CPT | Performed by: INTERNAL MEDICINE

## 2019-12-03 PROCEDURE — G8482 FLU IMMUNIZE ORDER/ADMIN: HCPCS | Performed by: INTERNAL MEDICINE

## 2019-12-03 PROCEDURE — G8417 CALC BMI ABV UP PARAM F/U: HCPCS | Performed by: INTERNAL MEDICINE

## 2019-12-03 PROCEDURE — 1123F ACP DISCUSS/DSCN MKR DOCD: CPT | Performed by: INTERNAL MEDICINE

## 2019-12-03 PROCEDURE — 4040F PNEUMOC VAC/ADMIN/RCVD: CPT | Performed by: INTERNAL MEDICINE

## 2019-12-03 PROCEDURE — G8400 PT W/DXA NO RESULTS DOC: HCPCS | Performed by: INTERNAL MEDICINE

## 2019-12-03 PROCEDURE — G8427 DOCREV CUR MEDS BY ELIG CLIN: HCPCS | Performed by: INTERNAL MEDICINE

## 2019-12-03 PROCEDURE — 1090F PRES/ABSN URINE INCON ASSESS: CPT | Performed by: INTERNAL MEDICINE

## 2019-12-03 RX ORDER — HYDROCODONE BITARTRATE AND ACETAMINOPHEN 5; 325 MG/1; MG/1
1 TABLET ORAL EVERY 6 HOURS PRN
COMMUNITY
End: 2019-12-13 | Stop reason: SDUPTHER

## 2019-12-13 ENCOUNTER — OFFICE VISIT (OUTPATIENT)
Dept: FAMILY MEDICINE CLINIC | Age: 84
End: 2019-12-13
Payer: MEDICARE

## 2019-12-13 VITALS — DIASTOLIC BLOOD PRESSURE: 80 MMHG | HEART RATE: 65 BPM | OXYGEN SATURATION: 99 % | SYSTOLIC BLOOD PRESSURE: 130 MMHG

## 2019-12-13 DIAGNOSIS — F32.9 REACTIVE DEPRESSION: ICD-10-CM

## 2019-12-13 DIAGNOSIS — M19.90 ARTHRITIS: ICD-10-CM

## 2019-12-13 DIAGNOSIS — E78.2 MIXED HYPERLIPIDEMIA: ICD-10-CM

## 2019-12-13 DIAGNOSIS — I50.32 CHRONIC HEART FAILURE WITH PRESERVED EJECTION FRACTION (HCC): ICD-10-CM

## 2019-12-13 DIAGNOSIS — I48.20 CHRONIC ATRIAL FIBRILLATION (HCC): Primary | ICD-10-CM

## 2019-12-13 DIAGNOSIS — I10 ESSENTIAL HYPERTENSION: ICD-10-CM

## 2019-12-13 PROCEDURE — 99214 OFFICE O/P EST MOD 30 MIN: CPT | Performed by: INTERNAL MEDICINE

## 2019-12-13 PROCEDURE — G8400 PT W/DXA NO RESULTS DOC: HCPCS | Performed by: INTERNAL MEDICINE

## 2019-12-13 PROCEDURE — G8417 CALC BMI ABV UP PARAM F/U: HCPCS | Performed by: INTERNAL MEDICINE

## 2019-12-13 PROCEDURE — 1036F TOBACCO NON-USER: CPT | Performed by: INTERNAL MEDICINE

## 2019-12-13 PROCEDURE — G8427 DOCREV CUR MEDS BY ELIG CLIN: HCPCS | Performed by: INTERNAL MEDICINE

## 2019-12-13 PROCEDURE — G8482 FLU IMMUNIZE ORDER/ADMIN: HCPCS | Performed by: INTERNAL MEDICINE

## 2019-12-13 PROCEDURE — 1123F ACP DISCUSS/DSCN MKR DOCD: CPT | Performed by: INTERNAL MEDICINE

## 2019-12-13 PROCEDURE — 1090F PRES/ABSN URINE INCON ASSESS: CPT | Performed by: INTERNAL MEDICINE

## 2019-12-13 PROCEDURE — 4040F PNEUMOC VAC/ADMIN/RCVD: CPT | Performed by: INTERNAL MEDICINE

## 2019-12-13 RX ORDER — SIMVASTATIN 40 MG
40 TABLET ORAL NIGHTLY
Qty: 90 TABLET | Refills: 1 | Status: CANCELLED | OUTPATIENT
Start: 2019-12-13

## 2019-12-13 RX ORDER — HYDROCODONE BITARTRATE AND ACETAMINOPHEN 7.5; 325 MG/1; MG/1
1 TABLET ORAL EVERY 6 HOURS PRN
COMMUNITY
End: 2019-12-13 | Stop reason: CLARIF

## 2019-12-13 RX ORDER — EZETIMIBE 10 MG/1
10 TABLET ORAL DAILY
Qty: 90 TABLET | Refills: 0 | Status: CANCELLED | OUTPATIENT
Start: 2019-12-13

## 2019-12-13 RX ORDER — LISINOPRIL 40 MG/1
40 TABLET ORAL DAILY
Qty: 90 TABLET | Refills: 1 | Status: SHIPPED
Start: 2019-12-13 | End: 2020-04-07

## 2019-12-13 RX ORDER — FLUTICASONE PROPIONATE 50 MCG
1 SPRAY, SUSPENSION (ML) NASAL PRN
Qty: 1 BOTTLE | Refills: 5 | Status: SHIPPED
Start: 2019-12-13 | End: 2020-04-15 | Stop reason: SDUPTHER

## 2019-12-13 RX ORDER — HYDROCODONE BITARTRATE AND ACETAMINOPHEN 5; 325 MG/1; MG/1
1 TABLET ORAL EVERY 6 HOURS PRN
Qty: 120 TABLET | Refills: 0 | Status: SHIPPED | OUTPATIENT
Start: 2019-12-13 | End: 2020-01-12

## 2019-12-13 RX ORDER — SIMVASTATIN 40 MG
40 TABLET ORAL NIGHTLY
Qty: 90 TABLET | Refills: 1 | Status: SHIPPED
Start: 2019-12-13 | End: 2020-04-15 | Stop reason: SDUPTHER

## 2019-12-13 RX ORDER — EZETIMIBE 10 MG/1
10 TABLET ORAL DAILY
Qty: 90 TABLET | Refills: 0 | Status: SHIPPED
Start: 2019-12-13 | End: 2020-04-15

## 2019-12-13 ASSESSMENT — ENCOUNTER SYMPTOMS
RESPIRATORY NEGATIVE: 1
ALLERGIC/IMMUNOLOGIC NEGATIVE: 1
EYES NEGATIVE: 1

## 2020-03-06 RX ORDER — FUROSEMIDE 20 MG/1
TABLET ORAL
Qty: 60 TABLET | Refills: 5 | Status: SHIPPED
Start: 2020-03-06 | End: 2020-09-14 | Stop reason: SDUPTHER

## 2020-04-01 ENCOUNTER — TELEPHONE (OUTPATIENT)
Dept: CARDIOLOGY CLINIC | Age: 85
End: 2020-04-01

## 2020-04-03 NOTE — PROGRESS NOTES
  Calcium Carb-Cholecalciferol (CALCIUM + D3) 600-200 MG-UNIT TABS, Take 1 tablet by mouth daily. , Disp: , Rfl:     ezetimibe (ZETIA) 10 MG tablet, Take 1 tablet by mouth daily (Patient not taking: Reported on 4/7/2020), Disp: 90 tablet, Rfl: 0    Cholecalciferol (VITAMIN D3) 2000 units CAPS, Take by mouth daily, Disp: , Rfl:       Note: This report was completed utilizing computer voice recognition software. Every effort has been made to ensure accuracy, however; inadvertent computerized transcription errors may be present. --Tracee Kay MD on 4/7/2020 at 10:23 AM          Pursuant to the emergency declaration under the [address] [place] [address], [address] [place] [address] authority and the [employer] Act, this Virtual  Visit was conducted, with patient's consent, to reduce the patient's risk of exposure to COVID-19 and provide continuity of care for an established patient. Services were provided through a video synchronous discussion virtually to substitute for in-person clinic visit.

## 2020-04-07 ENCOUNTER — VIRTUAL VISIT (OUTPATIENT)
Dept: CARDIOLOGY CLINIC | Age: 85
End: 2020-04-07
Payer: MEDICARE

## 2020-04-07 VITALS
WEIGHT: 200 LBS | HEIGHT: 68 IN | DIASTOLIC BLOOD PRESSURE: 86 MMHG | BODY MASS INDEX: 30.31 KG/M2 | SYSTOLIC BLOOD PRESSURE: 147 MMHG | HEART RATE: 75 BPM

## 2020-04-07 PROCEDURE — 99212 OFFICE O/P EST SF 10 MIN: CPT | Performed by: INTERNAL MEDICINE

## 2020-04-07 RX ORDER — LISINOPRIL 40 MG/1
TABLET ORAL
Qty: 90 TABLET | Refills: 0 | Status: SHIPPED
Start: 2020-04-07 | End: 2020-04-17 | Stop reason: SDUPTHER

## 2020-04-15 ENCOUNTER — VIRTUAL VISIT (OUTPATIENT)
Dept: PRIMARY CARE CLINIC | Age: 85
End: 2020-04-15
Payer: MEDICARE

## 2020-04-15 PROCEDURE — 4040F PNEUMOC VAC/ADMIN/RCVD: CPT | Performed by: INTERNAL MEDICINE

## 2020-04-15 PROCEDURE — 1123F ACP DISCUSS/DSCN MKR DOCD: CPT | Performed by: INTERNAL MEDICINE

## 2020-04-15 PROCEDURE — G8427 DOCREV CUR MEDS BY ELIG CLIN: HCPCS | Performed by: INTERNAL MEDICINE

## 2020-04-15 PROCEDURE — 1090F PRES/ABSN URINE INCON ASSESS: CPT | Performed by: INTERNAL MEDICINE

## 2020-04-15 PROCEDURE — G8417 CALC BMI ABV UP PARAM F/U: HCPCS | Performed by: INTERNAL MEDICINE

## 2020-04-15 PROCEDURE — 99214 OFFICE O/P EST MOD 30 MIN: CPT | Performed by: INTERNAL MEDICINE

## 2020-04-15 PROCEDURE — G8400 PT W/DXA NO RESULTS DOC: HCPCS | Performed by: INTERNAL MEDICINE

## 2020-04-15 PROCEDURE — 1036F TOBACCO NON-USER: CPT | Performed by: INTERNAL MEDICINE

## 2020-04-15 RX ORDER — FLUTICASONE PROPIONATE 50 MCG
1 SPRAY, SUSPENSION (ML) NASAL PRN
Qty: 1 BOTTLE | Refills: 5 | Status: SHIPPED | OUTPATIENT
Start: 2020-04-15

## 2020-04-15 RX ORDER — SIMVASTATIN 40 MG
40 TABLET ORAL NIGHTLY
Qty: 90 TABLET | Refills: 0 | Status: SHIPPED
Start: 2020-04-15 | End: 2020-07-10 | Stop reason: SDUPTHER

## 2020-04-15 ASSESSMENT — ENCOUNTER SYMPTOMS
ALLERGIC/IMMUNOLOGIC NEGATIVE: 1
EYES NEGATIVE: 1
RESPIRATORY NEGATIVE: 1

## 2020-04-15 NOTE — PROGRESS NOTES
tartrate (LOPRESSOR) 50 MG tablet Take 1.5 tablets by mouth 2 times daily  Patient taking differently: Take 50 mg by mouth 2 times daily Indications: taking 1 and 1/2 tablet twice a day   Anais Flor MD   Cholecalciferol (VITAMIN D3) 2000 units CAPS Take by mouth daily  Historical Provider, MD   Multiple Vitamins-Minerals (THERAPEUTIC MULTIVITAMIN-MINERALS) tablet Take 1 tablet by mouth daily. Historical Provider, MD   Calcium Carb-Cholecalciferol (CALCIUM + D3) 600-200 MG-UNIT TABS Take 1 tablet by mouth daily.   Historical Provider, MD        Allergies   Allergen Reactions    Amoxicillin-Pot Clavulanate Diarrhea    Crestor [Rosuvastatin] Other (See Comments)     Muscle fatigue    Lipitor [Atorvastatin] Other (See Comments)     Muscle fatigue    Sulfa Antibiotics Hives       Past Medical History:   Diagnosis Date    AAA (abdominal aortic aneurysm) (HCC)     Anticoagulant long-term use     Atrial fibrillation ([address]     Carotid artery stenosis     Hepatitis A     Hyperlipidemia     Hypertension     Incisional hernia     Spinal stenosis     Thoracic back pain     Thoracic back pain/Scoliosis    Thoracic radiculopathy     Referred [facility]       Past Surgical History:   Procedure Laterality Date    [facility]    APPENDECTOMY      CARDIOVERSION  10/28/2014    OTHER SURGICAL HISTORY      epidural injections in spine    SPINE SURGERY  10/2014    TONSILLECTOMY         Social History     Socioeconomic History    Marital status:      Spouse name: Not on file    Number of children: Not on file    Years of education: Not on file    Highest education level: Not on file   Occupational History    Not on file   Social Needs    Financial resource strain: Not on file    Food insecurity     Worry: Not on file     Inability: Not on file    Transportation needs     Medical: Not on file     Non-medical: Not on file   Tobacco Use    Smoking status: Former Smoker

## 2020-04-17 RX ORDER — LISINOPRIL 40 MG/1
40 TABLET ORAL DAILY
Qty: 90 TABLET | Refills: 0 | Status: SHIPPED
Start: 2020-04-17 | End: 2020-07-10 | Stop reason: SDUPTHER

## 2020-04-17 NOTE — TELEPHONE ENCOUNTER
Last Appointment:  12/13/2019  Future Appointments   Date Time Provider [place] Allisonisti   7/7/2020 10:45 AM Ariana Kulkarni MD [id number] [facility]   7/10/2020 10:15 AM Kaitlynn Sanchez MD [facility]  ruht asking we send in lisinopril to [facility]  Her refills got messed up since she is back from [facility]

## 2020-05-11 RX ORDER — EZETIMIBE 10 MG/1
TABLET ORAL
Qty: 90 TABLET | Refills: 0 | Status: SHIPPED
Start: 2020-05-11 | End: 2020-05-15 | Stop reason: SDUPTHER

## 2020-05-15 RX ORDER — EZETIMIBE 10 MG/1
TABLET ORAL
Qty: 90 TABLET | Refills: 0 | Status: SHIPPED | OUTPATIENT
Start: 2020-05-15 | End: 2020-05-15 | Stop reason: SDUPTHER

## 2020-05-15 RX ORDER — EZETIMIBE 10 MG/1
TABLET ORAL
Qty: 90 TABLET | Refills: 0 | Status: SHIPPED | OUTPATIENT
Start: 2020-05-15 | End: 2020-07-10 | Stop reason: SDUPTHER

## 2020-07-07 ENCOUNTER — OFFICE VISIT (OUTPATIENT)
Dept: CARDIOLOGY CLINIC | Age: 85
End: 2020-07-07
Payer: MEDICARE

## 2020-07-07 VITALS
BODY MASS INDEX: 33.26 KG/M2 | HEIGHT: 67 IN | HEART RATE: 63 BPM | DIASTOLIC BLOOD PRESSURE: 74 MMHG | WEIGHT: 211.9 LBS | SYSTOLIC BLOOD PRESSURE: 126 MMHG

## 2020-07-07 PROCEDURE — 93000 ELECTROCARDIOGRAM COMPLETE: CPT | Performed by: INTERNAL MEDICINE

## 2020-07-07 PROCEDURE — 1090F PRES/ABSN URINE INCON ASSESS: CPT | Performed by: INTERNAL MEDICINE

## 2020-07-07 PROCEDURE — 4040F PNEUMOC VAC/ADMIN/RCVD: CPT | Performed by: INTERNAL MEDICINE

## 2020-07-07 PROCEDURE — G8400 PT W/DXA NO RESULTS DOC: HCPCS | Performed by: INTERNAL MEDICINE

## 2020-07-07 PROCEDURE — 1036F TOBACCO NON-USER: CPT | Performed by: INTERNAL MEDICINE

## 2020-07-07 PROCEDURE — G8417 CALC BMI ABV UP PARAM F/U: HCPCS | Performed by: INTERNAL MEDICINE

## 2020-07-07 PROCEDURE — 1123F ACP DISCUSS/DSCN MKR DOCD: CPT | Performed by: INTERNAL MEDICINE

## 2020-07-07 PROCEDURE — 99213 OFFICE O/P EST LOW 20 MIN: CPT | Performed by: INTERNAL MEDICINE

## 2020-07-07 PROCEDURE — G8427 DOCREV CUR MEDS BY ELIG CLIN: HCPCS | Performed by: INTERNAL MEDICINE

## 2020-07-07 RX ORDER — HYDROCODONE BITARTRATE AND ACETAMINOPHEN 5; 325 MG/1; MG/1
1 TABLET ORAL EVERY 6 HOURS PRN
COMMUNITY
End: 2020-07-10 | Stop reason: SDUPTHER

## 2020-07-07 NOTE — PROGRESS NOTES
significant valvular abnormality. The descending thoracic aorta had extensive atherosclerosis with Grade III-IV atheroma protruding into the lumen. Extensive disease throughout the descending aorta. Arch unable to be clearly assessed. 14. Chronic anticoagulation with Eliquis started 10/2014. 15. Lexiscan MPS, 11/11/2014. Normal EF. Soft tissue attenuation. Normal perfusion. Low risk/low probability. 16. OP cardiac evaluation, 05/05/2015. Recurrent AF which, based on symptoms, started 05/04/2015. Rate 122. Prescribed Lopressor 50 mg b.i.d. and continuation of Xarelto. 17. 24-hour ambulatory monitor, 05/14/2015. AF. Low/high rate 65/145, mean 112 bpm. No symptoms. No significant pauses. Occasional PVC's. 18. OP BAO guided DCCV, 05/29/2015. Successful conversion of AF to sinus mechanism. 19. OP cardiac evaluation, 10/06/2015. Atrial fibrillation with ventricular response, 96 per minute. 20. OP cardiac evaluation, 04/26/2016, fatigue and dyspnea.  Possible depression.  Atrial fibrillation with ventricular response 100.  Holter monitor to be obtained.    21. 24-hour Holter monitor, 04/29/2016.  AF.  Average ventricular response 92 bpm.  No prolonged pauses.    22. OP cardiac assessment, 10/25/2016. Asymptomatic.  Using apixaban.    23. Ultrasound showed carotid on the left 100% stenosis (per patient).   24. Lexiscan stress MPS, 07/09/2019. Normal perfusion.  Normal EF.  Low risk. 25. Echo, 11/06/2019. Atrial fibrillation.  Severe left atrial dilatation.  Left ventricle not dilated. Estimated EF 55-65%.  Mild to moderate mitral regurgitation with posterior jet.  Aortic valve sclerosis.      Review of Systems:  Constitutional: negative for fever and chills  Respiratory: negative for cough and hemoptysis  Cardiovascular:   Gastrointestinal: negative for abdominal pain, diarrhea, nausea and vomiting  Genitourinary:negative for dysuria and hematuria  Derm: negative for rash and skin lesion(s)  Neurological: negative for seizures and tremors  Endocrine: negative for diabetic symptoms including polydipsia and polyuria  Musculoskeletal: negative for CTD  Psychiatric: negative for psychosis and major depression    On examination, she is an alert pleasant elderly  female in no distress. .   Skin is warm and dry. Respirations are unlabored. /74   Pulse 63   Ht 5' 7\" (1.702 m)   Wt 211 lb 14.4 oz (96.1 kg)   BMI 33.19 kg/m² . HEENT negative for scleral icterus. Extraocular muscles intact. No facial asymmetry or central cyanosis. Neck without masses or goiter. No bruit or JVD. Cardiac apex not displaced. Rhythm regular. Abdomen normal.  Extremities without edema. EKG today shows normal sinus rhythm with rare APCs and frequent PVCs which are monomorphic and trigeminal    The patient is symptomatically and functionally stable. Medications are reviewed today and no changes made. She should remain on anticoagulation without interruption.   She will have cardiac follow-up in 1 year    At completion of today's visit, medications include the following:    Current Outpatient Medications:     HYDROcodone-acetaminophen (NORCO) 5-325 MG per tablet, Take 1 tablet by mouth every 6 hours as needed for Pain., Disp: , Rfl:     apixaban (ELIQUIS) 5 MG TABS tablet, Take 1 tablet by mouth 2 times daily, Disp: 60 tablet, Rfl: 5    ezetimibe (ZETIA) 10 MG tablet, TAKE 1 TABLET BY MOUTH EVERY DAY, Disp: 90 tablet, Rfl: 0    lisinopril (PRINIVIL;ZESTRIL) 40 MG tablet, Take 1 tablet by mouth daily, Disp: 90 tablet, Rfl: 0    fluticasone (FLONASE) 50 MCG/ACT nasal spray, 1 spray by Nasal route as needed for Rhinitis or Allergies, Disp: 1 Bottle, Rfl: 5    simvastatin (ZOCOR) 40 MG tablet, Take 1 tablet by mouth nightly, Disp: 90 tablet, Rfl: 0    furosemide (LASIX) 20 MG tablet, TAKE 1 TABLET BY MOUTH TWICE A DAY, Disp: 60 tablet, Rfl: 5    metoprolol tartrate (LOPRESSOR) 50 MG tablet, Take 1.5 tablets by mouth 2 times daily (Patient taking differently: Take 50 mg by mouth 2 times daily Indications: taking 1 and 1/2 tablet twice a day ), Disp: 270 tablet, Rfl: 3    Cholecalciferol (VITAMIN D3) 2000 units CAPS, Take by mouth daily, Disp: , Rfl:     Multiple Vitamins-Minerals (THERAPEUTIC MULTIVITAMIN-MINERALS) tablet, Take 1 tablet by mouth daily. , Disp: , Rfl:     Calcium Carb-Cholecalciferol (CALCIUM + D3) 600-200 MG-UNIT TABS, Take 1 tablet by mouth daily. , Disp: , Rfl:       Note: This report was completed utilizing computer voice recognition software. Every effort has been made to ensure accuracy, however; inadvertent computerized transcription errors may be present.     --Iván Brenner MD on 7/7/2020 at 10:26 AM

## 2020-07-10 ENCOUNTER — OFFICE VISIT (OUTPATIENT)
Dept: FAMILY MEDICINE CLINIC | Age: 85
End: 2020-07-10
Payer: MEDICARE

## 2020-07-10 ENCOUNTER — HOSPITAL ENCOUNTER (OUTPATIENT)
Age: 85
Discharge: HOME | End: 2020-07-12
Payer: MEDICARE

## 2020-07-10 VITALS
HEART RATE: 59 BPM | DIASTOLIC BLOOD PRESSURE: 74 MMHG | SYSTOLIC BLOOD PRESSURE: 122 MMHG | BODY MASS INDEX: 33.27 KG/M2 | HEIGHT: 67 IN | WEIGHT: 212 LBS | OXYGEN SATURATION: 97 %

## 2020-07-10 LAB
ALBUMIN SERPL-MCNC: 4.9 G/DL (ref 3.5–5.2)
ALP BLD-CCNC: 47 U/L (ref 35–104)
ALT SERPL-CCNC: 14 U/L (ref 0–32)
ANION GAP SERPL CALCULATED.3IONS-SCNC: 13 MMOL/L (ref 7–16)
AST SERPL-CCNC: 20 U/L (ref 0–31)
BASOPHILS ABSOLUTE: 0.06 E9/L (ref 0–0.2)
BASOPHILS RELATIVE PERCENT: 0.8 % (ref 0–2)
BILIRUB SERPL-MCNC: 0.6 MG/DL (ref 0–1.2)
BUN BLDV-MCNC: 19 MG/DL (ref 8–23)
CALCIUM SERPL-MCNC: 10.2 MG/DL (ref 8.6–10.2)
CHLORIDE BLD-SCNC: 101 MMOL/L (ref 98–107)
CHOLESTEROL, TOTAL: 147 MG/DL (ref 0–199)
CO2: 26 MMOL/L (ref 22–29)
CREAT SERPL-MCNC: 1.1 MG/DL (ref 0.5–1)
EOSINOPHILS ABSOLUTE: 0.32 E9/L (ref 0.05–0.5)
EOSINOPHILS RELATIVE PERCENT: 4.2 % (ref 0–6)
GFR AFRICAN AMERICAN: 57
GFR NON-AFRICAN AMERICAN: 47 ML/MIN/1.73
GLUCOSE BLD-MCNC: 104 MG/DL (ref 74–99)
HBA1C MFR BLD: 6.1 %
HCT VFR BLD CALC: 50.6 % (ref 34–48)
HDLC SERPL-MCNC: 39 MG/DL
HEMOGLOBIN: 15.6 G/DL (ref 11.5–15.5)
IMMATURE GRANULOCYTES #: 0.02 E9/L
IMMATURE GRANULOCYTES %: 0.3 % (ref 0–5)
LDL CHOLESTEROL CALCULATED: 73 MG/DL (ref 0–99)
LYMPHOCYTES ABSOLUTE: 2.69 E9/L (ref 1.5–4)
LYMPHOCYTES RELATIVE PERCENT: 35.1 % (ref 20–42)
MCH RBC QN AUTO: 28.8 PG (ref 26–35)
MCHC RBC AUTO-ENTMCNC: 30.8 % (ref 32–34.5)
MCV RBC AUTO: 93.4 FL (ref 80–99.9)
MONOCYTES ABSOLUTE: 0.77 E9/L (ref 0.1–0.95)
MONOCYTES RELATIVE PERCENT: 10.1 % (ref 2–12)
NEUTROPHILS ABSOLUTE: 3.8 E9/L (ref 1.8–7.3)
NEUTROPHILS RELATIVE PERCENT: 49.5 % (ref 43–80)
PDW BLD-RTO: 15.2 FL (ref 11.5–15)
PLATELET # BLD: 213 E9/L (ref 130–450)
PMV BLD AUTO: 10.6 FL (ref 7–12)
POTASSIUM SERPL-SCNC: 4.5 MMOL/L (ref 3.5–5)
RBC # BLD: 5.42 E12/L (ref 3.5–5.5)
SODIUM BLD-SCNC: 140 MMOL/L (ref 132–146)
TOTAL PROTEIN: 8 G/DL (ref 6.4–8.3)
TRIGL SERPL-MCNC: 177 MG/DL (ref 0–149)
VLDLC SERPL CALC-MCNC: 35 MG/DL
WBC # BLD: 7.7 E9/L (ref 4.5–11.5)

## 2020-07-10 PROCEDURE — 1036F TOBACCO NON-USER: CPT | Performed by: INTERNAL MEDICINE

## 2020-07-10 PROCEDURE — G8427 DOCREV CUR MEDS BY ELIG CLIN: HCPCS | Performed by: INTERNAL MEDICINE

## 2020-07-10 PROCEDURE — 36415 COLL VENOUS BLD VENIPUNCTURE: CPT

## 2020-07-10 PROCEDURE — 80061 LIPID PANEL: CPT

## 2020-07-10 PROCEDURE — 99214 OFFICE O/P EST MOD 30 MIN: CPT | Performed by: INTERNAL MEDICINE

## 2020-07-10 PROCEDURE — G8400 PT W/DXA NO RESULTS DOC: HCPCS | Performed by: INTERNAL MEDICINE

## 2020-07-10 PROCEDURE — 1090F PRES/ABSN URINE INCON ASSESS: CPT | Performed by: INTERNAL MEDICINE

## 2020-07-10 PROCEDURE — 80053 COMPREHEN METABOLIC PANEL: CPT

## 2020-07-10 PROCEDURE — 4040F PNEUMOC VAC/ADMIN/RCVD: CPT | Performed by: INTERNAL MEDICINE

## 2020-07-10 PROCEDURE — G8417 CALC BMI ABV UP PARAM F/U: HCPCS | Performed by: INTERNAL MEDICINE

## 2020-07-10 PROCEDURE — 83036 HEMOGLOBIN GLYCOSYLATED A1C: CPT | Performed by: INTERNAL MEDICINE

## 2020-07-10 PROCEDURE — 85025 COMPLETE CBC W/AUTO DIFF WBC: CPT

## 2020-07-10 PROCEDURE — 1123F ACP DISCUSS/DSCN MKR DOCD: CPT | Performed by: INTERNAL MEDICINE

## 2020-07-10 RX ORDER — HYDROCODONE BITARTRATE AND ACETAMINOPHEN 5; 325 MG/1; MG/1
1 TABLET ORAL EVERY 6 HOURS PRN
Qty: 120 TABLET | Refills: 0 | Status: SHIPPED | OUTPATIENT
Start: 2020-07-10 | End: 2020-07-10 | Stop reason: SDUPTHER

## 2020-07-10 RX ORDER — HYDROCODONE BITARTRATE AND ACETAMINOPHEN 5; 325 MG/1; MG/1
1 TABLET ORAL EVERY 6 HOURS PRN
Qty: 120 TABLET | Refills: 0 | Status: SHIPPED | OUTPATIENT
Start: 2020-09-10 | End: 2020-11-20 | Stop reason: SDUPTHER

## 2020-07-10 RX ORDER — EZETIMIBE 10 MG/1
TABLET ORAL
Qty: 90 TABLET | Refills: 0 | Status: SHIPPED
Start: 2020-07-10 | End: 2020-11-13 | Stop reason: SDUPTHER

## 2020-07-10 RX ORDER — METOPROLOL TARTRATE 50 MG/1
75 TABLET, FILM COATED ORAL 2 TIMES DAILY
Qty: 270 TABLET | Refills: 3 | Status: SHIPPED
Start: 2020-07-10 | End: 2020-11-20 | Stop reason: SDUPTHER

## 2020-07-10 RX ORDER — SIMVASTATIN 40 MG
40 TABLET ORAL NIGHTLY
Qty: 90 TABLET | Refills: 0 | Status: SHIPPED
Start: 2020-07-10 | End: 2020-11-20 | Stop reason: SDUPTHER

## 2020-07-10 RX ORDER — LISINOPRIL 40 MG/1
40 TABLET ORAL DAILY
Qty: 90 TABLET | Refills: 0 | Status: SHIPPED
Start: 2020-07-10 | End: 2020-10-13

## 2020-07-10 ASSESSMENT — ENCOUNTER SYMPTOMS
RESPIRATORY NEGATIVE: 1
EYES NEGATIVE: 1
ALLERGIC/IMMUNOLOGIC NEGATIVE: 1

## 2020-07-10 NOTE — PROGRESS NOTES
7/10/2020    Samuel Hernandez (:  1935) is a 82 y.o. female, here for evaluation of the following medical concerns:    Patient is just recently follow-up with Dr. Karena Hatchet. There is no evidence of congestive heart failure and her heart rate is well controlled. Patient remains on Eliquis. Tolerating the medication without significant problems. She denies orthopnea or PND. She does have a fair amount of arthritic pain and she does occasionally take Vicodin. She usually splits the dose and takes half in the morning and half in the evening and this is usually after she does a lot of activities. The patient denies excessive thirst, urination, hunger. She does have a history of glucose intolerance and her hemoglobin A1c today is 6.1. This is slightly increased from previous. Hyperlipidemia     Hypertension           Review of Systems   Constitutional: Negative. HENT: Negative. Eyes: Negative. Respiratory: Negative. Cardiovascular:        Atrial fibrillation without evidence of rapid ventricular response. Denies orthopnea PND or other symptoms consistent with congestive heart failure. Gastrointestinal:        Left lower quadrant pain   Endocrine:        Glucose intolerance. Last hemoglobin A1c was 6.1   Genitourinary: Negative. Musculoskeletal:        Thoracic spine pain. Allergic/Immunologic: Negative. Neurological: Negative. Hematological: Negative. Psychiatric/Behavioral:        Depression and insomnia issues have improved.      Problem List: Malaise and fatigue, Essential hypertension, Hyperlipidemia  Health Maintenance:  Colonoscopy - (2014)  Bone Density Test Screening - (2009)  Couseled on Home Safety - (10/12/2015)  Influenza Vaccination - (2015)  Influenza Vaccination - (2017)  Tdap - (2017) DISCUSSED  Zoster/Shingles Vaccine - (2017) DISCUSSED  Shingrix Vaccine (Shingles) - (2018) SLIP GIVEN  Medical Problems:  Hypertension, Hyperlipidemia  Atrial Fibrillation - (10/2014) POST SURGERY-[place]  Abdominal Aortic Aneurysm - CT   Thoracic Back Pain/Scoliosis  Thoracic Radiculopathy - REFERRED TO [facility]  Chronic RLL Infiltate - [facility]  Lumbar Spinal Stenosis - [facility] THERAP[facility] 2/10 improved with therapy- REFERRED TO [facility] 13  Incisional Hernia - (2012) CAUSING PAIN  Carotid Artery Stenosis - (2017)  Surgical Hx:  AAA - LAST CT , ORDERED 11/10  Tonsillectomy  Appendectomy - 2011 GANGRENOUS  Lumbar Surgery - (2014) Len Valencia  OB/Gyn Hx:: (4)Parity: Full term (3), one miscarriage  Reviewed, no changes. FH:  Father:  MI -  age 67. Mother:  Cerebrovascular Accident (CVA) -  age 80. Reviewed, no changes. SH:  Marital: . Personal Habits: Alcohol: Occasionally consumes wine. Exercise Type: Does housework daily. Reviewed, no changes. Date: 2019  Was the patient queried about smoking behavior? Yes No  Does the patient currently smoke? Smoking: Patient is a former smoker. Prior to Visit Medications    Medication Sig Taking? Authorizing Provider   HYDROcodone-acetaminophen (NORCO) 5-325 MG per tablet Take 1 tablet by mouth every 6 hours as needed for Pain.  Yes Historical Provider, MD   apixaban (ELIQUIS) 5 MG TABS tablet Take 1 tablet by mouth 2 times daily Yes Steven Alarcon MD   ezetimibe (ZETIA) 10 MG tablet TAKE 1 TABLET BY MOUTH EVERY DAY Yes Steven Alarcon MD   lisinopril (PRINIVIL;ZESTRIL) 40 MG tablet Take 1 tablet by mouth daily Yes Steven Alarcon MD   fluticasone (FLONASE) 50 MCG/ACT nasal spray 1 spray by Nasal route as needed for Rhinitis or Allergies Yes Steven Alarcon MD   simvastatin (ZOCOR) 40 MG tablet Take 1 tablet by mouth nightly Yes Steven Alarcon MD   furosemide (LASIX) 20 MG tablet TAKE 1 TABLET BY MOUTH TWICE A DAY Yes Mika Mcgarry MD   metoprolol tartrate (LOPRESSOR) 50 MG tablet Take 1.5 tablets by mouth 2 times daily  Patient taking differently: Take 50 mg by mouth 2 times daily Indications: taking 1 and 1/2 tablet twice a day  Yes Kellie Best MD   Cholecalciferol (VITAMIN D3) 2000 units CAPS Take by mouth daily Yes Historical Provider, MD   Multiple Vitamins-Minerals (THERAPEUTIC MULTIVITAMIN-MINERALS) tablet Take 1 tablet by mouth daily. Yes Historical Provider, MD   Calcium Carb-Cholecalciferol (CALCIUM + D3) 600-200 MG-UNIT TABS Take 1 tablet by mouth daily.  Yes Historical Provider, MD        Allergies   Allergen Reactions    Amoxicillin-Pot Clavulanate Diarrhea    Crestor [Rosuvastatin] Other (See Comments)     Muscle fatigue    Lipitor [Atorvastatin] Other (See Comments)     Muscle fatigue    Sulfa Antibiotics Hives       Past Medical History:   Diagnosis Date    AAA (abdominal aortic aneurysm) (HCC)     Anticoagulant long-term use     Atrial fibrillation (HCC)     Carotid artery stenosis     Hepatitis A     Hyperlipidemia     Hypertension     Incisional hernia     Spinal stenosis     Thoracic back pain     Thoracic back pain/Scoliosis    Thoracic radiculopathy     Referred to [facility]       Past Surgical History:   Procedure Laterality Date    [facility]    APPENDECTOMY      CARDIOVERSION  10/28/2014    OTHER SURGICAL HISTORY      epidural injections in spine    SPINE SURGERY  10/2014    TONSILLECTOMY         Social History     Socioeconomic History    Marital status:      Spouse name: Not on file    Number of children: Not on file    Years of education: Not on file    Highest education level: Not on file   Occupational History    Not on file   Social Needs    Financial resource strain: Not on file    Food insecurity     Worry: Not on file     Inability: Not on file   [employer] needs     Medical: Not on file     Non-medical: Not on file   Tobacco Use    Smoking status: Former Smoker     Packs/day: 1.00     Years: 15.00     Pack years: 15.00     Types: Cigarettes     Last attempt to quit: Pedal  pulses: 2+ and equal bilaterally. Extremities: No clubbing, cyanosis or 1+ edema below the mid tibia bilaterally. Slightly more prominent on the left than the right  Abdomen: Bowel sounds are normoactive. Palpation reveals softness, with no distension, organomegaly or  tenderness. No abdominal masses palpable. No palpable hepatosplenomegaly. Musculo: Walks with a limping gait. Upper Extremities: ROM: Significant limitation in range of motion of the left  shoulder. Hematoma decreased in size from previous visit. Lower Extremities: ROM: Crepitus bilaterally more extensive on the left. Skin: Dry and warm with no rash. Neuro: Alert and oriented x3. Mood is normal. Affect is normal. Speech is articulate and fluent. DTR's are  symmetric, intact and 2+ bilaterally. Pain with percussion and deep palpation over the level of T8. No evidence of erythema, warmth, or fluctuance  Kenji Khoury was seen today for atrial fibrillation. Diagnoses and all orders for this visit:    Chronic atrial fibrillation    Essential hypertension  -     lisinopril (PRINIVIL;ZESTRIL) 40 MG tablet; Take 1 tablet by mouth daily  -     Comprehensive Metabolic Panel; Future  -     Lipid Panel; Future    Anticoagulant long-term use  -     CBC Auto Differential; Future    Arthritis  -     Discontinue: HYDROcodone-acetaminophen (NORCO) 5-325 MG per tablet; Take 1 tablet by mouth every 6 hours as needed for Pain for up to 30 days.  -     HYDROcodone-acetaminophen (NORCO) 5-325 MG per tablet; Take 1 tablet by mouth every 6 hours as needed for Pain for up to 30 days. Glucose intolerance  -     POCT glycosylated hemoglobin (Hb A1C)    Mixed hyperlipidemia  -     Lipid Panel; Future    Other orders  -     simvastatin (ZOCOR) 40 MG tablet; Take 1 tablet by mouth nightly  -     metoprolol tartrate (LOPRESSOR) 50 MG tablet;  Take 1.5 tablets by mouth 2 times daily  -     ezetimibe (ZETIA) 10 MG tablet; TAKE 1 TABLET BY MOUTH EVERY DAY    Patient is to have lab work today. I will see her back in approximately 4 months. Screenings are up-to-date. She was instructed to follow-up with Dr. use in 1 year. There is no residual evidence of congestive heart failure.

## 2020-09-14 RX ORDER — FUROSEMIDE 20 MG/1
TABLET ORAL
Qty: 60 TABLET | Refills: 5 | Status: SHIPPED
Start: 2020-09-14 | End: 2020-11-20 | Stop reason: SDUPTHER

## 2020-09-14 NOTE — TELEPHONE ENCOUNTER
Last Appointment:  7/10/2020  Future Appointments   Date Time Provider Orlando Rodriguez   11/10/2020 11:00 AM Yomaira Solis  [address] [place] [address]

## 2020-10-13 RX ORDER — LISINOPRIL 40 MG/1
TABLET ORAL
Qty: 90 TABLET | Refills: 0 | Status: SHIPPED
Start: 2020-10-13 | End: 2020-11-20 | Stop reason: SDUPTHER

## 2020-10-13 NOTE — TELEPHONE ENCOUNTER
Last Appointment:  7/10/2020  Future Appointments   Date Time Provider Orlando Jennifer   11/10/2020 11:00 AM MD [facility]     Refill requested.

## 2020-11-13 NOTE — TELEPHONE ENCOUNTER
Last Appointment:  7/10/2020  Future Appointments   Date Time Provider Orlando Rodriguez   11/20/2020 11:45 AM Robyn Arevalo  [address]

## 2020-11-14 RX ORDER — EZETIMIBE 10 MG/1
TABLET ORAL
Qty: 90 TABLET | Refills: 1 | Status: SHIPPED
Start: 2020-11-14 | End: 2020-11-20 | Stop reason: SDUPTHER

## 2020-11-20 ENCOUNTER — OFFICE VISIT (OUTPATIENT)
Dept: FAMILY MEDICINE CLINIC | Age: 85
End: 2020-11-20
Payer: MEDICARE

## 2020-11-20 VITALS
HEART RATE: 71 BPM | OXYGEN SATURATION: 98 % | BODY MASS INDEX: 32.65 KG/M2 | SYSTOLIC BLOOD PRESSURE: 124 MMHG | DIASTOLIC BLOOD PRESSURE: 68 MMHG | HEIGHT: 67 IN | WEIGHT: 208 LBS

## 2020-11-20 DIAGNOSIS — M19.90 ARTHRITIS: ICD-10-CM

## 2020-11-20 DIAGNOSIS — I10 ESSENTIAL HYPERTENSION: ICD-10-CM

## 2020-11-20 LAB
ALBUMIN SERPL-MCNC: 4.7 G/DL (ref 3.5–5.2)
ALP BLD-CCNC: 52 U/L (ref 35–104)
ALT SERPL-CCNC: 38 U/L (ref 0–32)
ANION GAP SERPL CALCULATED.3IONS-SCNC: 18 MMOL/L (ref 7–16)
AST SERPL-CCNC: 37 U/L (ref 0–31)
BILIRUB SERPL-MCNC: 0.7 MG/DL (ref 0–1.2)
BUN BLDV-MCNC: 20 MG/DL (ref 8–23)
CALCIUM SERPL-MCNC: 10.5 MG/DL (ref 8.6–10.2)
CHLORIDE BLD-SCNC: 102 MMOL/L (ref 98–107)
CO2: 24 MMOL/L (ref 22–29)
CREAT SERPL-MCNC: 1.3 MG/DL (ref 0.5–1)
GFR AFRICAN AMERICAN: 47
GFR NON-AFRICAN AMERICAN: 39 ML/MIN/1.73
GLUCOSE BLD-MCNC: 99 MG/DL (ref 74–99)
POTASSIUM SERPL-SCNC: 4.8 MMOL/L (ref 3.5–5)
SODIUM BLD-SCNC: 144 MMOL/L (ref 132–146)
TOTAL PROTEIN: 8 G/DL (ref 6.4–8.3)

## 2020-11-20 PROCEDURE — G8417 CALC BMI ABV UP PARAM F/U: HCPCS | Performed by: INTERNAL MEDICINE

## 2020-11-20 PROCEDURE — G8427 DOCREV CUR MEDS BY ELIG CLIN: HCPCS | Performed by: INTERNAL MEDICINE

## 2020-11-20 PROCEDURE — 1036F TOBACCO NON-USER: CPT | Performed by: INTERNAL MEDICINE

## 2020-11-20 PROCEDURE — G8484 FLU IMMUNIZE NO ADMIN: HCPCS | Performed by: INTERNAL MEDICINE

## 2020-11-20 PROCEDURE — 1090F PRES/ABSN URINE INCON ASSESS: CPT | Performed by: INTERNAL MEDICINE

## 2020-11-20 PROCEDURE — G8400 PT W/DXA NO RESULTS DOC: HCPCS | Performed by: INTERNAL MEDICINE

## 2020-11-20 PROCEDURE — 99214 OFFICE O/P EST MOD 30 MIN: CPT | Performed by: INTERNAL MEDICINE

## 2020-11-20 PROCEDURE — G0008 ADMIN INFLUENZA VIRUS VAC: HCPCS | Performed by: INTERNAL MEDICINE

## 2020-11-20 PROCEDURE — 4040F PNEUMOC VAC/ADMIN/RCVD: CPT | Performed by: INTERNAL MEDICINE

## 2020-11-20 PROCEDURE — 90694 VACC AIIV4 NO PRSRV 0.5ML IM: CPT | Performed by: INTERNAL MEDICINE

## 2020-11-20 PROCEDURE — 1123F ACP DISCUSS/DSCN MKR DOCD: CPT | Performed by: INTERNAL MEDICINE

## 2020-11-20 RX ORDER — HYDROCODONE BITARTRATE AND ACETAMINOPHEN 5; 325 MG/1; MG/1
1 TABLET ORAL EVERY 6 HOURS PRN
Qty: 120 TABLET | Refills: 0 | Status: SHIPPED
Start: 2020-11-20 | End: 2021-03-16 | Stop reason: SDUPTHER

## 2020-11-20 RX ORDER — LISINOPRIL 40 MG/1
TABLET ORAL
Qty: 90 TABLET | Refills: 0 | Status: SHIPPED
Start: 2020-11-20 | End: 2021-03-16 | Stop reason: SDUPTHER

## 2020-11-20 RX ORDER — SIMVASTATIN 40 MG
40 TABLET ORAL NIGHTLY
Qty: 90 TABLET | Refills: 0 | Status: SHIPPED
Start: 2020-11-20 | End: 2021-03-16 | Stop reason: SDUPTHER

## 2020-11-20 RX ORDER — FUROSEMIDE 20 MG/1
TABLET ORAL
Qty: 60 TABLET | Refills: 5 | Status: SHIPPED
Start: 2020-11-20 | End: 2021-03-16 | Stop reason: SDUPTHER

## 2020-11-20 RX ORDER — EZETIMIBE 10 MG/1
TABLET ORAL
Qty: 90 TABLET | Refills: 1 | Status: SHIPPED
Start: 2020-11-20 | End: 2021-03-16 | Stop reason: SDUPTHER

## 2020-11-20 RX ORDER — METOPROLOL TARTRATE 50 MG/1
75 TABLET, FILM COATED ORAL 2 TIMES DAILY
Qty: 270 TABLET | Refills: 3 | Status: SHIPPED
Start: 2020-11-20 | End: 2021-03-16 | Stop reason: SDUPTHER

## 2020-11-20 ASSESSMENT — ENCOUNTER SYMPTOMS
EYES NEGATIVE: 1
RESPIRATORY NEGATIVE: 1
ALLERGIC/IMMUNOLOGIC NEGATIVE: 1

## 2020-12-21 DIAGNOSIS — I10 ESSENTIAL HYPERTENSION: ICD-10-CM

## 2020-12-21 DIAGNOSIS — E78.2 MIXED HYPERLIPIDEMIA: ICD-10-CM

## 2020-12-21 LAB
ALBUMIN SERPL-MCNC: 4.3 G/DL (ref 3.5–5.2)
ALP BLD-CCNC: 49 U/L (ref 35–104)
ALT SERPL-CCNC: 29 U/L (ref 0–32)
ANION GAP SERPL CALCULATED.3IONS-SCNC: 11 MMOL/L (ref 7–16)
AST SERPL-CCNC: 27 U/L (ref 0–31)
BILIRUB SERPL-MCNC: 0.5 MG/DL (ref 0–1.2)
BUN BLDV-MCNC: 18 MG/DL (ref 8–23)
CALCIUM SERPL-MCNC: 10 MG/DL (ref 8.6–10.2)
CHLORIDE BLD-SCNC: 103 MMOL/L (ref 98–107)
CO2: 28 MMOL/L (ref 22–29)
CREAT SERPL-MCNC: 1.3 MG/DL (ref 0.5–1)
GFR AFRICAN AMERICAN: 47
GFR NON-AFRICAN AMERICAN: 39 ML/MIN/1.73
GLUCOSE BLD-MCNC: 112 MG/DL (ref 74–99)
POTASSIUM SERPL-SCNC: 4.6 MMOL/L (ref 3.5–5)
SODIUM BLD-SCNC: 142 MMOL/L (ref 132–146)
TOTAL PROTEIN: 7.4 G/DL (ref 6.4–8.3)

## 2021-02-11 ENCOUNTER — OFFICE VISIT (OUTPATIENT)
Dept: FAMILY MEDICINE CLINIC | Age: 86
End: 2021-02-11
Payer: MEDICARE

## 2021-02-11 VITALS
DIASTOLIC BLOOD PRESSURE: 88 MMHG | OXYGEN SATURATION: 97 % | WEIGHT: 210 LBS | SYSTOLIC BLOOD PRESSURE: 152 MMHG | HEART RATE: 68 BPM | TEMPERATURE: 97.8 F | BODY MASS INDEX: 32.89 KG/M2

## 2021-02-11 DIAGNOSIS — L02.213 CUTANEOUS ABSCESS OF CHEST WALL: Primary | ICD-10-CM

## 2021-02-11 PROCEDURE — G8400 PT W/DXA NO RESULTS DOC: HCPCS | Performed by: PHYSICIAN ASSISTANT

## 2021-02-11 PROCEDURE — 1036F TOBACCO NON-USER: CPT | Performed by: PHYSICIAN ASSISTANT

## 2021-02-11 PROCEDURE — 10060 I&D ABSCESS SIMPLE/SINGLE: CPT | Performed by: PHYSICIAN ASSISTANT

## 2021-02-11 PROCEDURE — 99213 OFFICE O/P EST LOW 20 MIN: CPT | Performed by: PHYSICIAN ASSISTANT

## 2021-02-11 PROCEDURE — G8427 DOCREV CUR MEDS BY ELIG CLIN: HCPCS | Performed by: PHYSICIAN ASSISTANT

## 2021-02-11 PROCEDURE — 4040F PNEUMOC VAC/ADMIN/RCVD: CPT | Performed by: PHYSICIAN ASSISTANT

## 2021-02-11 PROCEDURE — G8417 CALC BMI ABV UP PARAM F/U: HCPCS | Performed by: PHYSICIAN ASSISTANT

## 2021-02-11 PROCEDURE — G8484 FLU IMMUNIZE NO ADMIN: HCPCS | Performed by: PHYSICIAN ASSISTANT

## 2021-02-11 PROCEDURE — 1090F PRES/ABSN URINE INCON ASSESS: CPT | Performed by: PHYSICIAN ASSISTANT

## 2021-02-11 PROCEDURE — 1123F ACP DISCUSS/DSCN MKR DOCD: CPT | Performed by: PHYSICIAN ASSISTANT

## 2021-02-11 RX ORDER — DOXYCYCLINE 100 MG/1
100 CAPSULE ORAL 2 TIMES DAILY
Qty: 20 CAPSULE | Refills: 0 | Status: SHIPPED | OUTPATIENT
Start: 2021-02-11 | End: 2021-02-21

## 2021-02-11 NOTE — PROGRESS NOTES
  simvastatin (ZOCOR) 40 MG tablet, Take 1 tablet by mouth nightly, Disp: 90 tablet, Rfl: 0    metoprolol tartrate (LOPRESSOR) 50 MG tablet, Take 1.5 tablets by mouth 2 times daily, Disp: 270 tablet, Rfl: 3    lisinopril (PRINIVIL;ZESTRIL) 40 MG tablet, take 1 tablet by mouth once daily, Disp: 90 tablet, Rfl: 0    furosemide (LASIX) 20 MG tablet, TAKE 1 TABLET BY MOUTH TWICE A DAY, Disp: 60 tablet, Rfl: 5    ezetimibe (ZETIA) 10 MG tablet, TAKE 1 TABLET BY MOUTH EVERY DAY, Disp: 90 tablet, Rfl: 1    apixaban (ELIQUIS) 5 MG TABS tablet, Take 1 tablet by mouth 2 times daily, Disp: 60 tablet, Rfl: 5    fluticasone (FLONASE) 50 MCG/ACT nasal spray, 1 spray by Nasal route as needed for Rhinitis or Allergies, Disp: 1 Bottle, Rfl: 5    Cholecalciferol (VITAMIN D3) 2000 units CAPS, Take by mouth daily, Disp: , Rfl:     Multiple Vitamins-Minerals (THERAPEUTIC MULTIVITAMIN-MINERALS) tablet, Take 1 tablet by mouth daily. , Disp: , Rfl:     Calcium Carb-Cholecalciferol (CALCIUM + D3) 600-200 MG-UNIT TABS, Take 1 tablet by mouth daily. , Disp: , Rfl:    Allergies   Allergen Reactions    Amoxicillin-Pot Clavulanate Diarrhea    Crestor [Rosuvastatin] Other (See Comments)     Muscle fatigue    Lipitor [Atorvastatin] Other (See Comments)     Muscle fatigue    Sulfa Antibiotics Hives        Objective:  Vitals:    02/11/21 1606 02/11/21 1609   BP: (!) 152/88 (!) 152/88   Pulse: 68    Temp: 97.8 °F (36.6 °C)    TempSrc: Temporal    SpO2: 97%    Weight: 210 lb (95.3 kg)         Exam:  Const: Appears healthy and well developed. No signs of acute distress present. Head/Face: Head is normocephalic, atraumatic. Facies is symmetric. ENMT: Buccal mucosa moist.  Neck: Trachea midline. Resp: No respiratory distress. Musculo: Pulses are equal bilaterally. Skin: Dry and warm. The patient has an abscess to the left anterior chest   measuring  5 cm X 6 cm. The area is erythematous, warm, and tender to the palpation. There is no discharge noted from the wound. No red streaking or adenopathy noted. Neuro: Alert and oriented x3. Speech is intact. Psych: Mood and affect are appropriate to situation. I and D Abscess:  Patient gave verbal consent to procedure. Wound was cleansed with betadine. The area was then injected with 2% Lidocaine HCL until satisfactory anesthesia was obtained. The abscess to the left anterior chest was incised with an 11 blade scalpel in a cruciate fashion. The opening was dilated with a hemostat and drainage occurred. Blood loss was negligible . Good hemostasis was present. The wound was left opened and 1/4\" iodoform gauze was used to pack the wound . A dry dressing was applied. The patient tolerated the procedure well and there were no complications. The patient will keep the area clean and dry. The packing will be left intact. Packing removal and wound recheck in 2 days. Ric Freeman was seen today for breast problem. Diagnoses and all orders for this visit:    Cutaneous abscess of chest wall  -     doxycycline monohydrate (MONODOX) 100 MG capsule; Take 1 capsule by mouth 2 times daily for 10 days        Patient is use warm heat over the abscess. Start doxycycline today. If patient develops any symptoms other than the localized tenderness that she is currently experiencing, she is to go to the ED for reevaluation and possible IV antibiotics. Otherwise follow-up on Monday for reevaluation.     Seen By:    Milvia Mendoza PA-C

## 2021-03-16 ENCOUNTER — OFFICE VISIT (OUTPATIENT)
Dept: FAMILY MEDICINE CLINIC | Age: 86
End: 2021-03-16
Payer: MEDICARE

## 2021-03-16 VITALS
BODY MASS INDEX: 32.26 KG/M2 | SYSTOLIC BLOOD PRESSURE: 120 MMHG | HEART RATE: 61 BPM | OXYGEN SATURATION: 98 % | WEIGHT: 206 LBS | DIASTOLIC BLOOD PRESSURE: 60 MMHG | TEMPERATURE: 97.9 F

## 2021-03-16 DIAGNOSIS — M19.90 ARTHRITIS: ICD-10-CM

## 2021-03-16 DIAGNOSIS — M48.062 SPINAL STENOSIS OF LUMBAR REGION WITH NEUROGENIC CLAUDICATION: ICD-10-CM

## 2021-03-16 DIAGNOSIS — I10 ESSENTIAL HYPERTENSION: ICD-10-CM

## 2021-03-16 DIAGNOSIS — I48.20 CHRONIC ATRIAL FIBRILLATION (HCC): Primary | ICD-10-CM

## 2021-03-16 DIAGNOSIS — E78.2 MIXED HYPERLIPIDEMIA: ICD-10-CM

## 2021-03-16 LAB
ALBUMIN SERPL-MCNC: 4.1 G/DL (ref 3.5–5.2)
ALP BLD-CCNC: 46 U/L (ref 35–104)
ALT SERPL-CCNC: 12 U/L (ref 0–32)
ANION GAP SERPL CALCULATED.3IONS-SCNC: 10 MMOL/L (ref 7–16)
AST SERPL-CCNC: 16 U/L (ref 0–31)
BILIRUB SERPL-MCNC: 0.5 MG/DL (ref 0–1.2)
BUN BLDV-MCNC: 19 MG/DL (ref 8–23)
CALCIUM SERPL-MCNC: 9 MG/DL (ref 8.6–10.2)
CHLORIDE BLD-SCNC: 102 MMOL/L (ref 98–107)
CO2: 26 MMOL/L (ref 22–29)
CREAT SERPL-MCNC: 1.1 MG/DL (ref 0.5–1)
GFR AFRICAN AMERICAN: 57
GFR NON-AFRICAN AMERICAN: 47 ML/MIN/1.73
GLUCOSE BLD-MCNC: 78 MG/DL (ref 74–99)
POTASSIUM SERPL-SCNC: 4.1 MMOL/L (ref 3.5–5)
SODIUM BLD-SCNC: 138 MMOL/L (ref 132–146)
TOTAL PROTEIN: 7.3 G/DL (ref 6.4–8.3)

## 2021-03-16 PROCEDURE — 1123F ACP DISCUSS/DSCN MKR DOCD: CPT | Performed by: INTERNAL MEDICINE

## 2021-03-16 PROCEDURE — G8484 FLU IMMUNIZE NO ADMIN: HCPCS | Performed by: INTERNAL MEDICINE

## 2021-03-16 PROCEDURE — G8417 CALC BMI ABV UP PARAM F/U: HCPCS | Performed by: INTERNAL MEDICINE

## 2021-03-16 PROCEDURE — 1090F PRES/ABSN URINE INCON ASSESS: CPT | Performed by: INTERNAL MEDICINE

## 2021-03-16 PROCEDURE — 99214 OFFICE O/P EST MOD 30 MIN: CPT | Performed by: INTERNAL MEDICINE

## 2021-03-16 PROCEDURE — 1036F TOBACCO NON-USER: CPT | Performed by: INTERNAL MEDICINE

## 2021-03-16 PROCEDURE — G8427 DOCREV CUR MEDS BY ELIG CLIN: HCPCS | Performed by: INTERNAL MEDICINE

## 2021-03-16 PROCEDURE — 4040F PNEUMOC VAC/ADMIN/RCVD: CPT | Performed by: INTERNAL MEDICINE

## 2021-03-16 PROCEDURE — G8400 PT W/DXA NO RESULTS DOC: HCPCS | Performed by: INTERNAL MEDICINE

## 2021-03-16 RX ORDER — SIMVASTATIN 40 MG
40 TABLET ORAL NIGHTLY
Qty: 90 TABLET | Refills: 0 | Status: SHIPPED
Start: 2021-03-16 | End: 2021-07-21 | Stop reason: SDUPTHER

## 2021-03-16 RX ORDER — FUROSEMIDE 20 MG/1
TABLET ORAL
Qty: 60 TABLET | Refills: 5 | Status: SHIPPED
Start: 2021-03-16 | End: 2021-07-21 | Stop reason: SDUPTHER

## 2021-03-16 RX ORDER — METOPROLOL TARTRATE 50 MG/1
75 TABLET, FILM COATED ORAL 2 TIMES DAILY
Qty: 270 TABLET | Refills: 3 | Status: SHIPPED
Start: 2021-03-16 | End: 2022-04-12 | Stop reason: SDUPTHER

## 2021-03-16 RX ORDER — LISINOPRIL 40 MG/1
TABLET ORAL
Qty: 90 TABLET | Refills: 0 | Status: SHIPPED
Start: 2021-03-16 | End: 2021-07-09

## 2021-03-16 RX ORDER — EZETIMIBE 10 MG/1
TABLET ORAL
Qty: 90 TABLET | Refills: 1 | Status: SHIPPED
Start: 2021-03-16 | End: 2021-07-21 | Stop reason: SDUPTHER

## 2021-03-16 RX ORDER — HYDROCODONE BITARTRATE AND ACETAMINOPHEN 5; 325 MG/1; MG/1
1 TABLET ORAL EVERY 6 HOURS PRN
Qty: 120 TABLET | Refills: 0 | Status: SHIPPED | OUTPATIENT
Start: 2021-03-30 | End: 2021-07-21 | Stop reason: SDUPTHER

## 2021-03-16 ASSESSMENT — ENCOUNTER SYMPTOMS
EYES NEGATIVE: 1
RESPIRATORY NEGATIVE: 1
BACK PAIN: 1
ALLERGIC/IMMUNOLOGIC NEGATIVE: 1

## 2021-03-16 NOTE — PROGRESS NOTES
DISCUSSED  Shingrix Vaccine (Shingles) - (2018) SLIP GIVEN  Medical Problems:  Hypertension, Hyperlipidemia  Atrial Fibrillation - (10/2014) POST SURGERY-[place]  Abdominal Aortic Aneurysm - CT   Thoracic Back Pain/Scoliosis  Thoracic Radiculopathy - REFERRED TO [facility]  Chronic RLL Infiltate - [facility]  Lumbar Spinal Stenosis - [facility] THERAPY 2/10 improved with therapy- REFERRED TO [facility] 13-recurrent- failed PT-MRI ordered 3/16/2021  Incisional Hernia - (2012) CAUSING PAIN  Carotid Artery Stenosis - ()  Surgical Hx:  AAA - LAST CT , ORDERED 11/10-normal CT 2019  Tonsillectomy  Appendectomy - 2011 GANGRENOUS  Lumbar Surgery - (2014) Rupesh Real  OB/Gyn Hx:: (4)Parity: Full term (3), one miscarriage  Reviewed, no changes. FH:  Father:  MI -  age 67. Mother:  Cerebrovascular Accident (CVA) -  age 80. Reviewed, no changes. SH:  Marital: . Personal Habits: Alcohol: Occasionally consumes wine. Exercise Type: Does housework daily. Reviewed, no changes. Date: 2019  Was the patient queried about smoking behavior? Yes   Does the patient currently smoke? Smoking: Patient is a former smoker. Prior to Visit Medications    Medication Sig Taking? Authorizing Provider   HYDROcodone-acetaminophen (NORCO) 5-325 MG per tablet Take 1 tablet by mouth every 6 hours as needed for Pain for up to 30 days.  Yes Osmin Becerril MD   simvastatin (ZOCOR) 40 MG tablet Take 1 tablet by mouth nightly Yes Osmin Becerril MD   metoprolol tartrate (LOPRESSOR) 50 MG tablet Take 1.5 tablets by mouth 2 times daily Yes Osmin Becerril MD   lisinopril (PRINIVIL;ZESTRIL) 40 MG tablet take 1 tablet by mouth once daily Yes Osmin Becerril MD   furosemide (LASIX) 20 MG tablet TAKE 1 TABLET BY MOUTH TWICE A DAY Yes Osmin Becerril MD   ezetimibe (ZETIA) 10 MG tablet TAKE 1 TABLET BY MOUTH EVERY DAY Yes Osmin Becerril MD   apixaban (ELIQUIS) 5 MG TABS tablet Take 1 tablet by mouth 2 times daily Yes Jorge Luis SILVA MD Yesi   fluticasone (FLONASE) 50 MCG/ACT nasal spray 1 spray by Nasal route as needed for Rhinitis or Allergies  Paula Rascon MD   Cholecalciferol (VITAMIN D3) 2000 units CAPS Take by mouth daily  Historical Provider, MD   Multiple Vitamins-Minerals (THERAPEUTIC MULTIVITAMIN-MINERALS) tablet Take 1 tablet by mouth daily. Historical Provider, MD   Calcium Carb-Cholecalciferol (CALCIUM + D3) 600-200 MG-UNIT TABS Take 1 tablet by mouth daily.   Historical Provider, MD        Allergies   Allergen Reactions    Amoxicillin-Pot Clavulanate Diarrhea    Crestor [Rosuvastatin] Other (See Comments)     Muscle fatigue    Lipitor [Atorvastatin] Other (See Comments)     Muscle fatigue    Sulfa Antibiotics Hives       Past Medical History:   Diagnosis Date    AAA (abdominal aortic aneurysm) (HCC)     Anticoagulant long-term use     Atrial fibrillation (HCC)     Carotid artery stenosis     Hepatitis A     Hyperlipidemia     Hypertension     Incisional hernia     Spinal stenosis     Thoracic back pain     Thoracic back pain/Scoliosis    Thoracic radiculopathy     Referred to [facility]       Past Surgical History:   Procedure Laterality Date    [facility]    APPENDECTOMY      CARDIOVERSION  10/28/2014    OTHER SURGICAL HISTORY      epidural injections in spine    SPINE SURGERY  10/2014    TONSILLECTOMY         Social History     Socioeconomic History    Marital status:      Spouse name: Not on file    Number of children: Not on file    Years of education: Not on file    Highest education level: Not on file   Occupational History    Not on file   Social Needs    Financial resource strain: Not on file    Food insecurity     Worry: Not on file     Inability: Not on file    Transportation needs     Medical: Not on file     Non-medical: Not on file   Tobacco Use    Smoking status: Former Smoker     Packs/day: 1.00     Years: 15.00     Pack years: 15.00     Types: Cigarettes     Quit date: 1990     Years since quittin.3    Smokeless tobacco: Never Used   Substance and Sexual Activity    Alcohol use: Yes     Comment: rarely    Drug use: No    Sexual activity: Not on file   Lifestyle    Physical activity     Days per week: Not on file     Minutes per session: Not on file    Stress: Not on file   Relationships    Social connections     Talks on phone: Not on file     Gets together: Not on file     Attends Oriental orthodox service: Not on file     Active member of club or organization: Not on file     Attends meetings of clubs or organizations: Not on file     Relationship status: Not on file    Intimate partner violence     Fear of current or ex partner: Not on file     Emotionally abused: Not on file     Physically abused: Not on file     Forced sexual activity: Not on file   Other Topics Concern    Not on file   Social History Narrative    Not on file        Family History   Problem Relation Age of Onset    Stroke Mother     Heart Disease Father     Heart Disease Brother        Vitals:    21 1444   BP: 120/60   Pulse: 61   Temp: 97.9 °F (36.6 °C)   SpO2: 98%   Weight: 206 lb (93.4 kg)     Estimated body mass index is 32.26 kg/m² as calculated from the following:    Height as of 20: 5' 7\" (1.702 m). Weight as of this encounter: 206 lb (93.4 kg). Physical Exam  Const: Appears healthy, well developed and well nourished. Appears obese. Eyes: EOMI in both eyes. PERRL. ENMT: External ears WNL. Tympanic membranes are intact. External nose WNL. Neck: Supple and symmetric. Palpation reveals no adenopathy. No masses appreciated. Thyroid: no nodule  appreciated or thyromegaly. No jugular venous distention. Resp: Respirations are unlabored. Respiration rate is normal. Auscultate good airflow. No rales, rhonchi or wheezes  appreciated over the lungs bilaterally. CV: Rhythm is regular. S1 is normal. S2 is normal. Carotids: no bruits.  Abdominal aorta: not palpable. Pedal  pulses: 2+ and equal bilaterally. Extremities: No clubbing, cyanosis . Trace  edema below the mid tibia bilaterally. Slightly more prominent on the left than the right  Abdomen: Bowel sounds are normoactive. Palpation reveals softness, with no distension, organomegaly or  tenderness. No abdominal masses palpable. No palpable hepatosplenomegaly. Musculo: Walks with a limping gait. Upper Extremities: ROM: Significant limitation in range of motion of the left  shoulder. Lower Extremities: ROM: Crepitus bilaterally more extensive on the left. Skin: Dry and warm with no rash. Neuro: Alert and oriented x3. Mood is normal. Affect is normal. Speech is articulate and fluent. Deep tendon reflexes are absent both patellar and plantar bilaterally. No evidence of erythema, warmth, or fluctuance  Franco Robertson was seen today for back pain. Diagnoses and all orders for this visit:    Chronic atrial fibrillation (HCC)    Arthritis  -     HYDROcodone-acetaminophen (NORCO) 5-325 MG per tablet; Take 1 tablet by mouth every 6 hours as needed for Pain for up to 30 days. -     Comprehensive Metabolic Panel; Future    Essential hypertension  -     lisinopril (PRINIVIL;ZESTRIL) 40 MG tablet; take 1 tablet by mouth once daily  -     Comprehensive Metabolic Panel; Future    Spinal stenosis of lumbar region with neurogenic claudication  -     MRI LUMBAR SPINE W CONTRAST; Future    Mixed hyperlipidemia    Other orders  -     apixaban (ELIQUIS) 5 MG TABS tablet; Take 1 tablet by mouth 2 times daily  -     ezetimibe (ZETIA) 10 MG tablet; TAKE 1 TABLET BY MOUTH EVERY DAY  -     furosemide (LASIX) 20 MG tablet; TAKE 1 TABLET BY MOUTH TWICE A DAY  -     metoprolol tartrate (LOPRESSOR) 50 MG tablet; Take 1.5 tablets by mouth 2 times daily  -     simvastatin (ZOCOR) 40 MG tablet; Take 1 tablet by mouth nightly    Patient will need further work-up of her spinal stenosis.   The question is whether this might be recurrent cyst. The other question is what we do with the results. She is somewhat reluctant to return to surgery and I don't blame her age 82. We will see what the MRI shows. She may respond to epidural injections although she has had these in the past without much success. She is already failed physical therapy.

## 2021-03-23 ENCOUNTER — TELEPHONE (OUTPATIENT)
Dept: FAMILY MEDICINE CLINIC | Age: 86
End: 2021-03-23

## 2021-03-23 DIAGNOSIS — M48.062 SPINAL STENOSIS OF LUMBAR REGION WITH NEUROGENIC CLAUDICATION: Primary | ICD-10-CM

## 2021-04-05 ENCOUNTER — TELEPHONE (OUTPATIENT)
Dept: FAMILY MEDICINE CLINIC | Age: 86
End: 2021-04-05

## 2021-04-06 DIAGNOSIS — M48.062 SPINAL STENOSIS OF LUMBAR REGION WITH NEUROGENIC CLAUDICATION: Primary | ICD-10-CM

## 2021-05-18 ENCOUNTER — OFFICE VISIT (OUTPATIENT)
Dept: FAMILY MEDICINE CLINIC | Age: 86
End: 2021-05-18
Payer: MEDICARE

## 2021-05-18 VITALS
RESPIRATION RATE: 18 BRPM | WEIGHT: 206.3 LBS | BODY MASS INDEX: 32.38 KG/M2 | OXYGEN SATURATION: 98 % | DIASTOLIC BLOOD PRESSURE: 82 MMHG | HEART RATE: 52 BPM | SYSTOLIC BLOOD PRESSURE: 126 MMHG | TEMPERATURE: 96.5 F | HEIGHT: 67 IN

## 2021-05-18 DIAGNOSIS — M79.671 RIGHT FOOT PAIN: Primary | ICD-10-CM

## 2021-05-18 DIAGNOSIS — I50.42 CHRONIC COMBINED SYSTOLIC AND DIASTOLIC HEART FAILURE (HCC): ICD-10-CM

## 2021-05-18 DIAGNOSIS — Z91.81 AT HIGH RISK FOR FALLS: ICD-10-CM

## 2021-05-18 PROCEDURE — 1090F PRES/ABSN URINE INCON ASSESS: CPT | Performed by: FAMILY MEDICINE

## 2021-05-18 PROCEDURE — G8417 CALC BMI ABV UP PARAM F/U: HCPCS | Performed by: FAMILY MEDICINE

## 2021-05-18 PROCEDURE — G8427 DOCREV CUR MEDS BY ELIG CLIN: HCPCS | Performed by: FAMILY MEDICINE

## 2021-05-18 PROCEDURE — 4040F PNEUMOC VAC/ADMIN/RCVD: CPT | Performed by: FAMILY MEDICINE

## 2021-05-18 PROCEDURE — 1123F ACP DISCUSS/DSCN MKR DOCD: CPT | Performed by: FAMILY MEDICINE

## 2021-05-18 PROCEDURE — G8400 PT W/DXA NO RESULTS DOC: HCPCS | Performed by: FAMILY MEDICINE

## 2021-05-18 PROCEDURE — 1036F TOBACCO NON-USER: CPT | Performed by: FAMILY MEDICINE

## 2021-05-18 PROCEDURE — 99213 OFFICE O/P EST LOW 20 MIN: CPT | Performed by: FAMILY MEDICINE

## 2021-05-18 RX ORDER — METHYLPREDNISOLONE 4 MG/1
TABLET ORAL
Qty: 1 KIT | Refills: 0 | Status: SHIPPED
Start: 2021-05-18 | End: 2021-10-12

## 2021-05-18 ASSESSMENT — ENCOUNTER SYMPTOMS: COLOR CHANGE: 0

## 2021-05-18 NOTE — PROGRESS NOTES
21  Fay Hernandez : 1935 Sex: female  Age: 82 y.o. Chief Complaint   Patient presents with    Foot Pain     right ankle into foot and heal        This patient is a 53-year-old white female with a chief complaint of right foot pain particularly over the calcaneus and over some soft tissue swelling of the anterior malleolus laterally. No history of trauma or injury there is no erythema no evidence of cellulitis. Review of Systems   Constitutional: Negative. Cardiovascular: Negative. Musculoskeletal: Positive for arthralgias, gait problem and joint swelling. Skin: Negative for color change, pallor, rash and wound. Current Outpatient Medications:     methylPREDNISolone (MEDROL DOSEPACK) 4 MG tablet, Take by mouth., Disp: 1 kit, Rfl: 0    apixaban (ELIQUIS) 5 MG TABS tablet, Take 1 tablet by mouth 2 times daily, Disp: 60 tablet, Rfl: 5    ezetimibe (ZETIA) 10 MG tablet, TAKE 1 TABLET BY MOUTH EVERY DAY, Disp: 90 tablet, Rfl: 1    furosemide (LASIX) 20 MG tablet, TAKE 1 TABLET BY MOUTH TWICE A DAY, Disp: 60 tablet, Rfl: 5    lisinopril (PRINIVIL;ZESTRIL) 40 MG tablet, take 1 tablet by mouth once daily, Disp: 90 tablet, Rfl: 0    metoprolol tartrate (LOPRESSOR) 50 MG tablet, Take 1.5 tablets by mouth 2 times daily, Disp: 270 tablet, Rfl: 3    simvastatin (ZOCOR) 40 MG tablet, Take 1 tablet by mouth nightly, Disp: 90 tablet, Rfl: 0    fluticasone (FLONASE) 50 MCG/ACT nasal spray, 1 spray by Nasal route as needed for Rhinitis or Allergies, Disp: 1 Bottle, Rfl: 5    Cholecalciferol (VITAMIN D3) 2000 units CAPS, Take by mouth daily, Disp: , Rfl:     Multiple Vitamins-Minerals (THERAPEUTIC MULTIVITAMIN-MINERALS) tablet, Take 1 tablet by mouth daily. , Disp: , Rfl:     Calcium Carb-Cholecalciferol (CALCIUM + D3) 600-200 MG-UNIT TABS, Take 1 tablet by mouth daily. , Disp: , Rfl:   Allergies   Allergen Reactions    Amoxicillin-Pot Clavulanate Diarrhea    Crestor [Rosuvastatin] Other (See Comments)     Muscle fatigue    Lipitor [Atorvastatin] Other (See Comments)     Muscle fatigue    Sulfa Antibiotics Hives       Past Medical History:   Diagnosis Date    AAA (abdominal aortic aneurysm) (HCC)     Anticoagulant long-term use     Atrial fibrillation (HCC)     Carotid artery stenosis     Hepatitis A     Hyperlipidemia     Hypertension     Incisional hernia     Spinal stenosis     Thoracic back pain     Thoracic back pain/Scoliosis    Thoracic radiculopathy     Referred to [facility]     Past Surgical History:   Procedure Laterality Date    [facility]    APPENDECTOMY      CARDIOVERSION  10/28/2014    OTHER SURGICAL HISTORY      epidural injections in spine    SPINE SURGERY  10/2014    TONSILLECTOMY       Family History   Problem Relation Age of Onset    Stroke Mother     Heart Disease Father     Heart Disease Brother      Social History     Tobacco Use    Smoking status: Former Smoker     Packs/day: 1.00     Years: 15.00     Pack years: 15.00     Types: Cigarettes     Quit date: 1990     Years since quittin.5    Smokeless tobacco: Never Used   Substance Use Topics    Alcohol use: Yes     Comment: rarely    Drug use: No        Vitals:    21 1155   BP: 126/82   Pulse: 52   Resp: 18   Temp: 96.5 °F (35.8 °C)   SpO2: 98%   Weight: 206 lb 4.8 oz (93.6 kg)   Height: 5' 7\" (1.702 m)       Physical Exam  Vitals reviewed. Constitutional:       Appearance: Normal appearance. HENT:      Head: Normocephalic and atraumatic. Cardiovascular:      Rate and Rhythm: Normal rate and regular rhythm. Heart sounds: No murmur heard. Pulmonary:      Breath sounds: Normal breath sounds. Musculoskeletal:         General: Swelling and tenderness present. No signs of injury. Right lower leg: No edema. Skin:     Findings: No bruising, erythema or rash. Neurological:      Mental Status: She is alert.          Assessment and

## 2021-06-28 ENCOUNTER — OFFICE VISIT (OUTPATIENT)
Dept: CARDIOLOGY CLINIC | Age: 86
End: 2021-06-28
Payer: MEDICARE

## 2021-06-28 VITALS
DIASTOLIC BLOOD PRESSURE: 99 MMHG | HEART RATE: 74 BPM | WEIGHT: 207.8 LBS | SYSTOLIC BLOOD PRESSURE: 161 MMHG | BODY MASS INDEX: 32.62 KG/M2 | HEIGHT: 67 IN | RESPIRATION RATE: 16 BRPM

## 2021-06-28 DIAGNOSIS — I48.0 PAROXYSMAL ATRIAL FIBRILLATION (HCC): ICD-10-CM

## 2021-06-28 DIAGNOSIS — I50.32 CHRONIC HEART FAILURE WITH PRESERVED EJECTION FRACTION (HCC): ICD-10-CM

## 2021-06-28 DIAGNOSIS — I10 ESSENTIAL HYPERTENSION: Primary | ICD-10-CM

## 2021-06-28 PROBLEM — M19.90 ARTHRITIS: Status: RESOLVED | Noted: 2019-12-13 | Resolved: 2021-06-28

## 2021-06-28 PROBLEM — J30.1 SEASONAL ALLERGIC RHINITIS DUE TO POLLEN: Status: RESOLVED | Noted: 2019-05-13 | Resolved: 2021-06-28

## 2021-06-28 PROBLEM — I50.42 CHRONIC COMBINED SYSTOLIC AND DIASTOLIC HEART FAILURE (HCC): Status: RESOLVED | Noted: 2019-10-14 | Resolved: 2021-06-28

## 2021-06-28 PROBLEM — M54.6 THORACIC SPINE PAIN: Status: RESOLVED | Noted: 2019-05-14 | Resolved: 2021-06-28

## 2021-06-28 PROCEDURE — G8427 DOCREV CUR MEDS BY ELIG CLIN: HCPCS | Performed by: INTERNAL MEDICINE

## 2021-06-28 PROCEDURE — 1036F TOBACCO NON-USER: CPT | Performed by: INTERNAL MEDICINE

## 2021-06-28 PROCEDURE — 1090F PRES/ABSN URINE INCON ASSESS: CPT | Performed by: INTERNAL MEDICINE

## 2021-06-28 PROCEDURE — G8400 PT W/DXA NO RESULTS DOC: HCPCS | Performed by: INTERNAL MEDICINE

## 2021-06-28 PROCEDURE — 93000 ELECTROCARDIOGRAM COMPLETE: CPT | Performed by: INTERNAL MEDICINE

## 2021-06-28 PROCEDURE — G8417 CALC BMI ABV UP PARAM F/U: HCPCS | Performed by: INTERNAL MEDICINE

## 2021-06-28 PROCEDURE — 1123F ACP DISCUSS/DSCN MKR DOCD: CPT | Performed by: INTERNAL MEDICINE

## 2021-06-28 PROCEDURE — 4040F PNEUMOC VAC/ADMIN/RCVD: CPT | Performed by: INTERNAL MEDICINE

## 2021-06-28 PROCEDURE — 99213 OFFICE O/P EST LOW 20 MIN: CPT | Performed by: INTERNAL MEDICINE

## 2021-06-28 NOTE — PROGRESS NOTES
Chief Complaint   Patient presents with    Atrial Fibrillation    Hypertension    Congestive Heart Failure       Patient Active Problem List    Diagnosis Date Noted    Spinal stenosis of lumbar region with neurogenic claudication 03/16/2021    Anticoagulant long-term use     Reactive depression 12/13/2019    Chronic heart failure with preserved ejection fraction ([facility] [address] 12/13/2019    Paroxysmal atrial fibrillation ([facility] 05/14/2019    Essential hypertension 07/06/2004    Hyperlipidemia 07/06/2004       Current Outpatient Medications   Medication Sig Dispense Refill    apixaban (ELIQUIS) 5 MG TABS tablet Take 1 tablet by mouth 2 times daily 60 tablet 5    ezetimibe (ZETIA) 10 MG tablet TAKE 1 TABLET BY MOUTH EVERY DAY 90 tablet 1    furosemide (LASIX) 20 MG tablet TAKE 1 TABLET BY MOUTH TWICE A DAY 60 tablet 5    lisinopril (PRINIVIL;ZESTRIL) 40 MG tablet take 1 tablet by mouth once daily 90 tablet 0    metoprolol tartrate (LOPRESSOR) 50 MG tablet Take 1.5 tablets by mouth 2 times daily 270 tablet 3    simvastatin (ZOCOR) 40 MG tablet Take 1 tablet by mouth nightly 90 tablet 0    fluticasone (FLONASE) 50 MCG/ACT nasal spray 1 spray by Nasal route as needed for Rhinitis or Allergies 1 Bottle 5    Cholecalciferol (VITAMIN D3) 2000 units CAPS Take by mouth daily      Multiple Vitamins-Minerals (THERAPEUTIC MULTIVITAMIN-MINERALS) tablet Take 1 tablet by mouth daily.  Calcium Carb-Cholecalciferol (CALCIUM + D3) 600-200 MG-UNIT TABS Take 1 tablet by mouth daily.  methylPREDNISolone (MEDROL DOSEPACK) 4 MG tablet Take by mouth. (Patient not taking: Reported on 6/28/2021) 1 kit 0     No current facility-administered medications for this visit.         Allergies   Allergen Reactions    Amoxicillin-Pot Clavulanate Diarrhea    Crestor [Rosuvastatin] Other (See Comments)     Muscle fatigue    Lipitor [Atorvastatin] Other (See Comments)     Muscle fatigue    Sulfa Antibiotics Hives       Vitals: of ambulatory difficulties, and functional capacity that seem to be similar to chronic compliants and denies   chest pain, orthopnea and syncope.          Medical History:  1. No history of MI, CHF, stroke, CKD. 2. COPD. 3. Hypertension. 4. Hyperlipidemia. 5. Spinal stenosis. Surgery, 10/20/2014. 6. Hepatitis A.  7. Tonsillectomy, appendectomy. 8. Cigarette abuse, 15 pack years. Quit 1990.  9. Allergy/intolerance to Crestor, Lipitor, sulfa. 10. AAA repair, 2005, [address].   11. Dobutrex stress echo, 11/18/2005. Normal. Stage II diastolic dysfunction. Mild CLV[facility]. 12. Chest CT, 07/01/2009. Calcified granuloma. Right basilar infiltrate/atelectasis with small effusion. COPD. 13. AF. BAO guided cardioversion, 10/28/2014, [facility]. BAO showed EF 50%. No chamber dilatation. No thrombus or SEC in NIMESH. No hemodynamically significant valvular abnormality. The descending thoracic aorta had extensive atherosclerosis with Grade III-IV atheroma protruding into the lumen. Extensive disease throughout the descending aorta. Arch unable to be clearly assessed. 14. Chronic anticoagulation with Eliquis started 10/2014. 15. Lexiscan MPS, 11/11/2014. Normal EF. Soft tissue attenuation. Normal perfusion. Low risk/low probability. 16. OP cardiac evaluation, 05/05/2015. Recurrent AF which, based on symptoms, started 05/04/2015. Rate 122. Prescribed Lopressor 50 mg b.i.d. and continuation of Xarelto. 17. 24-hour ambulatory monitor, 05/14/2015. AF. Low/high rate 65/145, mean 112 bpm. No symptoms. No significant pauses. Occasional PVC's. 18. OP BAO guided DCCV, 05/29/2015. Successful conversion of AF to sinus mechanism. 19. OP cardiac evaluation, 10/06/2015. Atrial fibrillation with ventricular response, 96 per minute.    20. OP cardiac evaluation, 04/26/2016, fatigue and dyspnea.  Possible depression.  Atrial fibrillation with ventricular response 100.  Holter monitor to be obtained.    21. 24-hour Holter monitor, 04/29/2016.  AF.  Average ventricular response 92 bpm.  No prolonged pauses.    22. OP cardiac assessment, 10/25/2016. Asymptomatic.  Using apixaban.    23. Ultrasound showed carotid on the left 100% stenosis (per patient).   24. Lexiscan stress MPS, 07/09/2019. Normal perfusion.  Normal EF.  Low risk. 25. Echo, 11/06/2019. Atrial fibrillation.  Severe left atrial dilatation.  Left ventricle not dilated. Estimated EF 55-65%.  Mild to moderate mitral regurgitation with posterior jet.  Aortic valve sclerosis.        Review of Systems:   Heart: as above   Lungs: as above   Eyes: denies changes in vision or discharge. Ears: denies changes in hearing or pain. Nose: denies epistaxis or masses   Throat: denies sore throat or trouble swallowing. Neuro: denies numbness, tingling, tremors. Skin: denies rashes or itching. : denies hematuria, dysuria   GI: denies vomiting, diarrhea   Psych: denies mood changed, anxiety, depression. all others negative. Physical Exam   BP (!) 161/99   Pulse 74   Resp 16   Ht 5' 7\" (1.702 m)   Wt 207 lb 12.8 oz (94.3 kg)   BMI 32.55 kg/m²   Constitutional: Oriented to person, place, and time. Well-developed and well-nourished. No distress. Head: Normocephalic and atraumatic. Eyes: EOM are normal. Pupils are equal, round, and reactive to light. Neck: Normal range of motion. Neck supple. No hepatojugular reflux and no JVD present. Carotid bruit is not present. No tracheal deviation present. No thyromegaly present. Cardiovascular: Normal rate, regular rhythm, normal heart sounds and intact distal pulses. Exam reveals no gallop and no friction rub. No murmur heard. Pulmonary/Chest: Effort normal and breath sounds normal. No respiratory distress. No wheezes. No rales. No tenderness. Abdominal: Soft. Bowel sounds are normal. No distension and no mass. No tenderness. No rebound and no guarding. Musculoskeletal: Normal range of motion. No edema and no tenderness. Lymphadenopathy:   No cervical adenopathy. No groin adenopathy. Neurological: Alert and oriented to person, place, and time. Skin: Skin is warm and dry. No rash noted. Not diaphoretic. No erythema. Psychiatric: Normal mood and affect. Behavior is normal.     EKG:  normal sinus rhythm, frequent PVCs . ASSESSMENT AND PLAN:  Patient Active Problem List   Diagnosis    Essential hypertension    Hyperlipidemia    Paroxysmal atrial fibrillation (HCC)    Reactive depression    Chronic heart failure with preserved ejection fraction (HCC)    Anticoagulant long-term use    Spinal stenosis of lumbar region with neurogenic claudication       Stable CV status  Continue [facility]        The patient was educated as to the symptoms that would require a prompt return to our office. Return visit in 1 year WITH DR CHA.     Eduardo Deng M.D  [facility]

## 2021-07-08 DIAGNOSIS — I10 ESSENTIAL HYPERTENSION: ICD-10-CM

## 2021-07-09 RX ORDER — LISINOPRIL 40 MG/1
TABLET ORAL
Qty: 90 TABLET | Refills: 0 | Status: SHIPPED
Start: 2021-07-09 | End: 2021-08-24 | Stop reason: SDUPTHER

## 2021-07-09 NOTE — TELEPHONE ENCOUNTER
Last Appointment:  3/16/2021  Future Appointments   Date Time Provider Orlando Rodriguez   7/21/2021 11:30 AM Nida Gaines  W [facility] [address]

## 2021-07-21 ENCOUNTER — OFFICE VISIT (OUTPATIENT)
Dept: FAMILY MEDICINE CLINIC | Age: 86
End: 2021-07-21
Payer: MEDICARE

## 2021-07-21 ENCOUNTER — TELEPHONE (OUTPATIENT)
Dept: FAMILY MEDICINE CLINIC | Age: 86
End: 2021-07-21

## 2021-07-21 ENCOUNTER — TELEPHONE (OUTPATIENT)
Dept: NON INVASIVE DIAGNOSTICS | Age: 86
End: 2021-07-21

## 2021-07-21 VITALS
TEMPERATURE: 97.9 F | SYSTOLIC BLOOD PRESSURE: 132 MMHG | OXYGEN SATURATION: 99 % | WEIGHT: 209 LBS | HEART RATE: 100 BPM | BODY MASS INDEX: 32.73 KG/M2 | DIASTOLIC BLOOD PRESSURE: 78 MMHG

## 2021-07-21 DIAGNOSIS — I10 ESSENTIAL HYPERTENSION: ICD-10-CM

## 2021-07-21 DIAGNOSIS — Z79.01 ANTICOAGULANT LONG-TERM USE: ICD-10-CM

## 2021-07-21 DIAGNOSIS — E78.2 MIXED HYPERLIPIDEMIA: ICD-10-CM

## 2021-07-21 DIAGNOSIS — I50.32 CHRONIC HEART FAILURE WITH PRESERVED EJECTION FRACTION (HCC): ICD-10-CM

## 2021-07-21 DIAGNOSIS — R06.02 SHORTNESS OF BREATH: Primary | ICD-10-CM

## 2021-07-21 DIAGNOSIS — I48.0 PAROXYSMAL ATRIAL FIBRILLATION (HCC): ICD-10-CM

## 2021-07-21 DIAGNOSIS — M19.90 ARTHRITIS: ICD-10-CM

## 2021-07-21 DIAGNOSIS — R06.02 SHORTNESS OF BREATH: ICD-10-CM

## 2021-07-21 LAB
ALBUMIN SERPL-MCNC: 4.5 G/DL (ref 3.5–5.2)
ALP BLD-CCNC: 43 U/L (ref 35–104)
ALT SERPL-CCNC: 9 U/L (ref 0–32)
ANION GAP SERPL CALCULATED.3IONS-SCNC: 24 MMOL/L (ref 7–16)
AST SERPL-CCNC: 23 U/L (ref 0–31)
BASOPHILS ABSOLUTE: 0.04 E9/L (ref 0–0.2)
BASOPHILS RELATIVE PERCENT: 0.4 % (ref 0–2)
BILIRUB SERPL-MCNC: 0.8 MG/DL (ref 0–1.2)
BUN BLDV-MCNC: 23 MG/DL (ref 6–23)
CALCIUM SERPL-MCNC: 10.4 MG/DL (ref 8.6–10.2)
CHLORIDE BLD-SCNC: 103 MMOL/L (ref 98–107)
CO2: 19 MMOL/L (ref 22–29)
CREAT SERPL-MCNC: 1.2 MG/DL (ref 0.5–1)
EOSINOPHILS ABSOLUTE: 0.35 E9/L (ref 0.05–0.5)
EOSINOPHILS RELATIVE PERCENT: 3.5 % (ref 0–6)
GFR AFRICAN AMERICAN: 52
GFR NON-AFRICAN AMERICAN: 43 ML/MIN/1.73
GLUCOSE BLD-MCNC: 67 MG/DL (ref 74–99)
HCT VFR BLD CALC: 47.8 % (ref 34–48)
HEMOGLOBIN: 14.5 G/DL (ref 11.5–15.5)
IMMATURE GRANULOCYTES #: 0.03 E9/L
IMMATURE GRANULOCYTES %: 0.3 % (ref 0–5)
LYMPHOCYTES ABSOLUTE: 2.89 E9/L (ref 1.5–4)
LYMPHOCYTES RELATIVE PERCENT: 29.3 % (ref 20–42)
MCH RBC QN AUTO: 28.4 PG (ref 26–35)
MCHC RBC AUTO-ENTMCNC: 30.3 % (ref 32–34.5)
MCV RBC AUTO: 93.5 FL (ref 80–99.9)
MONOCYTES ABSOLUTE: 1.02 E9/L (ref 0.1–0.95)
MONOCYTES RELATIVE PERCENT: 10.3 % (ref 2–12)
NEUTROPHILS ABSOLUTE: 5.54 E9/L (ref 1.8–7.3)
NEUTROPHILS RELATIVE PERCENT: 56.2 % (ref 43–80)
PDW BLD-RTO: 14.6 FL (ref 11.5–15)
PLATELET # BLD: 199 E9/L (ref 130–450)
PMV BLD AUTO: 10.9 FL (ref 7–12)
POTASSIUM SERPL-SCNC: 4.5 MMOL/L (ref 3.5–5)
PRO-BNP: 3051 PG/ML (ref 0–450)
RBC # BLD: 5.11 E12/L (ref 3.5–5.5)
SODIUM BLD-SCNC: 146 MMOL/L (ref 132–146)
TOTAL PROTEIN: 7.9 G/DL (ref 6.4–8.3)
WBC # BLD: 9.9 E9/L (ref 4.5–11.5)

## 2021-07-21 PROCEDURE — G8417 CALC BMI ABV UP PARAM F/U: HCPCS | Performed by: INTERNAL MEDICINE

## 2021-07-21 PROCEDURE — 1123F ACP DISCUSS/DSCN MKR DOCD: CPT | Performed by: INTERNAL MEDICINE

## 2021-07-21 PROCEDURE — 99214 OFFICE O/P EST MOD 30 MIN: CPT | Performed by: INTERNAL MEDICINE

## 2021-07-21 PROCEDURE — 4040F PNEUMOC VAC/ADMIN/RCVD: CPT | Performed by: INTERNAL MEDICINE

## 2021-07-21 PROCEDURE — 1090F PRES/ABSN URINE INCON ASSESS: CPT | Performed by: INTERNAL MEDICINE

## 2021-07-21 PROCEDURE — G8400 PT W/DXA NO RESULTS DOC: HCPCS | Performed by: INTERNAL MEDICINE

## 2021-07-21 PROCEDURE — G8427 DOCREV CUR MEDS BY ELIG CLIN: HCPCS | Performed by: INTERNAL MEDICINE

## 2021-07-21 PROCEDURE — 1036F TOBACCO NON-USER: CPT | Performed by: INTERNAL MEDICINE

## 2021-07-21 RX ORDER — HYDROCODONE BITARTRATE AND ACETAMINOPHEN 5; 325 MG/1; MG/1
1 TABLET ORAL EVERY 6 HOURS PRN
Qty: 120 TABLET | Refills: 0 | Status: SHIPPED
Start: 2021-07-21 | End: 2021-12-21 | Stop reason: SDUPTHER

## 2021-07-21 RX ORDER — SIMVASTATIN 40 MG
40 TABLET ORAL NIGHTLY
Qty: 90 TABLET | Refills: 0 | Status: SHIPPED
Start: 2021-07-21 | End: 2021-08-24 | Stop reason: SDUPTHER

## 2021-07-21 RX ORDER — FUROSEMIDE 20 MG/1
TABLET ORAL
Qty: 60 TABLET | Refills: 5 | Status: SHIPPED
Start: 2021-07-21 | End: 2021-07-22 | Stop reason: ALTCHOICE

## 2021-07-21 RX ORDER — DULOXETIN HYDROCHLORIDE 30 MG/1
30 CAPSULE, DELAYED RELEASE ORAL DAILY
Qty: 30 CAPSULE | Refills: 5 | Status: SHIPPED
Start: 2021-07-21 | End: 2021-08-24 | Stop reason: SDUPTHER

## 2021-07-21 RX ORDER — EZETIMIBE 10 MG/1
TABLET ORAL
Qty: 90 TABLET | Refills: 1 | Status: SHIPPED
Start: 2021-07-21 | End: 2021-12-21 | Stop reason: SDUPTHER

## 2021-07-21 ASSESSMENT — ENCOUNTER SYMPTOMS
RESPIRATORY NEGATIVE: 1
EYES NEGATIVE: 1
BACK PAIN: 1
ALLERGIC/IMMUNOLOGIC NEGATIVE: 1

## 2021-07-21 NOTE — PROGRESS NOTES
2021    María Elena Hernandez (:  1935) is a 82 y.o. female, here for evaluation of the following medical concerns:    Patient is complaining of dyspnea with exertion. The dyspnea also occurs at rest.  This rest dyspnea seems to be very intermittent in nature. She does have history of atrial fibrillation. Recently was evaluated by cardiology. Last echocardiogram was done in 2019 and she did have preserved ejection fraction. Patient has not noticed orthopnea or PND. She does have a chronic nonproductive cough. She was a smoker previously remotely. Patient denies any chest pain, chest pressure nausea vomiting or diaphoresis with these episodes. The patient does have a history of vascular disease. Back Pain    Hyperlipidemia    Hypertension    Other          Review of Systems   Constitutional: Negative. HENT: Negative. Eyes: Negative. Respiratory: Negative. Cardiovascular:        Atrial fibrillation without evidence of rapid ventricular response. Denies orthopnea PND or other symptoms consistent with congestive heart failure. Gastrointestinal:        Denies abdominal pain. Endocrine:        Glucose intolerance. Last hemoglobin A1c was 6.1   Genitourinary: Negative. Musculoskeletal: Positive for back pain. Thoracic and lumbar spine pain. Generalized osteoarthritis pain   Allergic/Immunologic: Negative. Neurological: Negative. Hematological: Negative. Psychiatric/Behavioral:        Depression secondary to inability to do activities of daily living.      Problem List: Malaise and fatigue, Essential hypertension, Hyperlipidemia  Health Maintenance:  Colonoscopy - (2014)  Bone Density Test Screening - (2009)  Couseled on Home Safety - (10/12/2015)  Influenza Vaccination - (2020)  Tdap - (2017) DISCUSSED  Zoster/Shingles Vaccine - (2017) DISCUSSED  Shingrix Vaccine (Shingles) - (2018) SLIP GIVEN  Medical Problems:  Hypertension, Hyperlipidemia  Atrial Fibrillation - (10/2014) POST SURGERY-[facility]  Abdominal Aortic Aneurysm - CT   Thoracic Back Pain/Scoliosis  Thoracic Radiculopathy - REFERRED TO [facility]  Chronic RLL Infiltate - [facility]  Lumbar Spinal Stenosis - [facility] 2/10 improved with therapy- [facility]ED TO [facility] 13-recurrent- failed PT-MRI ordered 3/16/2021  Incisional Hernia - (2012) CAUSING PAIN  Carotid Artery Stenosis - ()  Surgical Hx:  AAA - LAST CT , ORDERED 11/10-normal CT 2019  Tonsillectomy  Appendectomy - 2011 GANGRENOUS  Lumbar Surgery - (2014) Nicolette Holcomb  OB/Gyn Hx:: (4)Parity: Full term (3), one miscarriage  Reviewed, no changes. FH:  Father:  MI -  age 67. Mother:  Cerebrovascular Accident (CVA) -  age 80. Reviewed, no changes. SH:  Marital: . Personal Habits: Alcohol: Occasionally consumes wine. Exercise Type: Does housework daily. Reviewed, no changes. Date: 2019  Was the patient queried about smoking behavior? Yes   Does the patient currently smoke? Smoking: Patient is a former smoker. Prior to Visit Medications    Medication Sig Taking? Authorizing Provider   HYDROcodone-acetaminophen (NORCO) 5-325 MG per tablet Take 1 tablet by mouth every 6 hours as needed for Pain for up to 30 days.  Yes Faiza Walters MD   apixaban (ELIQUIS) 5 MG TABS tablet Take 1 tablet by mouth 2 times daily Yes Faiza Walters MD   furosemide (LASIX) 20 MG tablet TAKE 1 TABLET BY MOUTH TWICE A DAY Yes Faiza Walters MD   ezetimibe (ZETIA) 10 MG tablet TAKE 1 TABLET BY MOUTH EVERY DAY Yes Faiza Walters MD   simvastatin (ZOCOR) 40 MG tablet Take 1 tablet by mouth nightly Yes Faiza Walters MD   DULoxetine (CYMBALTA) 30 MG extended release capsule Take 1 capsule by mouth daily Yes Faiza Walters MD   lisinopril (PRINIVIL;ZESTRIL) 40 MG tablet take 1 tablet by mouth once daily Yes Faiza Walters MD   metoprolol tartrate (LOPRESSOR) 50 MG tablet Take 1.5 tablets by mouth 2 times daily Yes Markus Miller MD   Calcium Carb-Cholecalciferol (CALCIUM + D3) 600-200 MG-UNIT TABS Take 1 tablet by mouth daily. Yes Historical Provider, MD   methylPREDNISolone (MEDROL DOSEPACK) 4 MG tablet Take by mouth. Patient not taking: Reported on 6/28/2021  Katelynn Penaloza DO   fluticasone (FLONASE) 50 MCG/ACT nasal spray 1 spray by Nasal route as needed for Rhinitis or Allergies  Markus Miller MD   Cholecalciferol (VITAMIN D3) 2000 units CAPS Take by mouth daily  Historical Provider, MD   Multiple Vitamins-Minerals (THERAPEUTIC MULTIVITAMIN-MINERALS) tablet Take 1 tablet by mouth daily.   Historical Provider, MD        Allergies   Allergen Reactions    Amoxicillin-Pot Clavulanate Diarrhea    Crestor [Rosuvastatin] Other (See Comments)     Muscle fatigue    Lipitor [Atorvastatin] Other (See Comments)     Muscle fatigue    Sulfa Antibiotics Hives       Past Medical History:   Diagnosis Date    AAA (abdominal aortic aneurysm) (HCC)     Anticoagulant long-term use     Atrial fibrillation ([facility] [address]     Carotid artery stenosis     Hepatitis A     Hyperlipidemia     Hypertension     Incisional hernia     Spinal stenosis     Thoracic back pain     Thoracic back pain/Scoliosis    Thoracic radiculopathy     Referred to [facility]       Past Surgical History:   Procedure Laterality Date    ABDOMINAL [facility]ORTIC [facility]IR  [facility]    APPENDECTOMY      CARDIOVERSION  10/28/2014    OTHER SURGICAL HISTORY      epidural injections in spine    SPINE SURGERY  10/2014    TONSILLECTOMY         Social History     Socioeconomic History    Marital status:      Spouse name: Not on file    Number of children: Not on file    Years of education: Not on file    Highest education level: Not on file   Occupational History    Not on file   Tobacco Use    Smoking status: Former Smoker     Packs/day: 1.00     Years: 15.00     Pack years: 15.00     Types: Cigarettes     Quit date: 11/4/1990     Years since quitting: 30.7    Smokeless tobacco: Never Used   Substance and Sexual Activity    Alcohol use: Yes     Comment: rarely    Drug use: No    Sexual activity: Not on file   Other Topics Concern    Not on file   Social History Narrative    Not on file     Social Determinants of Health     Financial Resource Strain:     Difficulty of Paying Living Expenses:    Food Insecurity:     Worried About Running Out of Food in the Last Year:     [address] in the Last Year:    Transportation Needs:     Lack of Transportation (Medical):  Lack of Transportation (Non-Medical):    Physical Activity:     Days of Exercise per Week:     Minutes of Exercise per Session:    Stress:     Feeling of Stress :    Social Connections:     Frequency of Communication with Friends and Family:     Frequency of Social Gatherings with Friends and Family:     Attends Holiness Services:     Active Member of Clubs or Organizations:     Attends Club or Organization Meetings:     Marital Status:    Intimate Partner Violence:     Fear of Current or Ex-Partner:     Emotionally Abused:     Physically Abused:     Sexually Abused:         Family History   Problem Relation Age of Onset    Stroke Mother     Heart Disease Father     Heart Disease Brother        Vitals:    07/21/21 1132 07/21/21 1155   BP: (!) 150/80 132/78   Pulse: 100    Temp: 97.9 °F (36.6 °C)    SpO2: 99%    Weight: 209 lb (94.8 kg)      Estimated body mass index is 32.73 kg/m² as calculated from the following:    Height as of 6/28/21: 5' 7\" (1.702 m). Weight as of this encounter: 209 lb (94.8 kg). Physical Exam  Const: Appears healthy, well developed and well nourished. Appears obese. Eyes: EOMI in both eyes. PERRL. ENMT: External ears WNL. Tympanic membranes are intact. External nose WNL. Neck: Supple and symmetric. Palpation reveals no adenopathy. No masses appreciated. Thyroid: no nodule  appreciated or thyromegaly. No jugular venous distention.   Resp: Respirations are unlabored. Respiration rate is normal. Auscultate good airflow. No rales, rhonchi or wheezes  appreciated over the lungs bilaterally. CV: Rhythm is regular. S1 is normal. S2 is normal. Carotids: no bruits. Abdominal aorta: not palpable. Pedal  pulses: 2+ and equal bilaterally. Extremities: No clubbing, cyanosis . Trace  edema below the mid tibia bilaterally. Slightly more prominent on the left than the right  Abdomen: Bowel sounds are normoactive. Palpation reveals softness, with no distension, organomegaly or  tenderness. No abdominal masses palpable. No palpable hepatosplenomegaly. Musculo: Walks with a limping gait. Upper Extremities: ROM: Significant limitation in range of motion of the left  shoulder. Lower Extremities: ROM: Crepitus bilaterally more extensive on the left. Skin: Dry and warm with no rash. Neuro: Alert and oriented x3. Mood is normal. Affect is normal. Speech is articulate and fluent. Deep tendon reflexes are absent both patellar and plantar bilaterally. No evidence of erythema, warmth, or fluctuance  Laverne Rinaldi was seen today for other. Diagnoses and all orders for this visit:    Shortness of breath  -     XR CHEST STANDARD (2 VW); Future  -     BRAIN NATRIURETIC PEPTIDE; Future  -     Comprehensive Metabolic Panel; Future  -     Longterm Continuous Cardiac Event Monitor; Future  -     CBC Auto Differential; Future    Arthritis  -     HYDROcodone-acetaminophen (NORCO) 5-325 MG per tablet; Take 1 tablet by mouth every 6 hours as needed for Pain for up to 30 days. Chronic heart failure with preserved ejection fraction ([facility] [address]  -     BRAIN NATRIURETIC PEPTIDE; Future  -     Comprehensive Metabolic Panel; Future  -     Longterm Continuous Cardiac Event Monitor; Future  -     CBC Auto Differential; Future    Essential hypertension  -     BRAIN NATRIURETIC PEPTIDE; Future  -     Comprehensive Metabolic Panel; Future  -     Longterm Continuous Cardiac Event Monitor;  Future  - CBC Auto Differential; Future    Paroxysmal atrial fibrillation (HCC)    Anticoagulant long-term use  -     BRAIN NATRIURETIC PEPTIDE; Future  -     Comprehensive Metabolic Panel; Future  -     Longterm Continuous Cardiac Event Monitor; Future  -     CBC Auto Differential; Future    Mixed hyperlipidemia  -     BRAIN NATRIURETIC PEPTIDE; Future  -     Comprehensive Metabolic Panel; Future  -     Longterm Continuous Cardiac Event Monitor; Future  -     CBC Auto Differential; Future    Other orders  -     apixaban (ELIQUIS) 5 MG TABS tablet; Take 1 tablet by mouth 2 times daily  -     furosemide (LASIX) 20 MG tablet; TAKE 1 TABLET BY MOUTH TWICE A DAY  -     ezetimibe (ZETIA) 10 MG tablet; TAKE 1 TABLET BY MOUTH EVERY DAY  -     simvastatin (ZOCOR) 40 MG tablet; Take 1 tablet by mouth nightly  -     DULoxetine (CYMBALTA) 30 MG extended release capsule; Take 1 capsule by mouth daily    The patient will need further evaluation of this dyspnea. Seems to be both at rest and with exertion although is more consistent with exertion. Chest x-ray will be obtained along with lab work. A prolonged Holter monitor will need to be obtained to see if this is possibly rapid atrial fibrillation she does have a very enlarged left atria. I will see the patient back in 1 month. Patient is placed on the Cymbalta secondary to increased symptoms of depression. It could secondarily help her arthritic symptoms.

## 2021-07-21 NOTE — TELEPHONE ENCOUNTER
Katey from [facility] called asking what type of event monitor you wanted? Preventice reads for 14-28 days   Zio reads for up to 30 days.

## 2021-07-21 NOTE — TELEPHONE ENCOUNTER
Called pt to get scheduled for a cardiac event monitor. Pt is scheduled. Called Dr. Bisi Wilcox office for clarification on which monitor he wants and the length of time, waiting for a call back from his office.     KOFI Connelly

## 2021-07-22 ENCOUNTER — TELEPHONE (OUTPATIENT)
Dept: NON INVASIVE DIAGNOSTICS | Age: 86
End: 2021-07-22

## 2021-07-22 DIAGNOSIS — I50.42 CHRONIC COMBINED SYSTOLIC AND DIASTOLIC CONGESTIVE HEART FAILURE (HCC): Primary | ICD-10-CM

## 2021-07-22 RX ORDER — BUMETANIDE 2 MG/1
2 TABLET ORAL DAILY
Qty: 30 TABLET | Refills: 3 | Status: SHIPPED
Start: 2021-07-22 | End: 2021-08-24 | Stop reason: SDUPTHER

## 2021-07-22 NOTE — TELEPHONE ENCOUNTER
Liz Lange from Electrophysiology called to say that the information that [facility] had given was incorrect. Preventice is 30 days  Zio is 3-14 days      The order will need to be CAR 50 for the event monitor (this covers either one). Pt has an appointment this morning at 10:20 with their office.

## 2021-07-22 NOTE — TELEPHONE ENCOUNTER
MA called Physician Office to clarify that the Preventice is for up to 30 days and the ZIO XT is for 3-14 days and also to notify office that patient is scheduled today in our office at 10:20 AM and we need a corrected order for a CAR50 not a [id number]. Lisa states she will reach out to Dr. Maliha Funes and that he is at the [facility] office.  Electronically signed by Lukas Flores MA on 7/22/2021 at 10:09 AM

## 2021-07-22 NOTE — TELEPHONE ENCOUNTER
Spoke with Katey and she will put the order in and have you sign off on it. I advised her to please place the order.

## 2021-07-22 NOTE — TELEPHONE ENCOUNTER
[facility]         - [phone]    She is calling about the event monitor that you ordered. She needs a new order for that put into the system because you ordered CAR-203 and it needs to be a CAR-50. Please just update in the system and she will go from there.

## 2021-07-22 NOTE — TELEPHONE ENCOUNTER
There is no ability to order car 50 in the epic system. If they want a car 50 they need to let me know how this is done.

## 2021-08-03 ENCOUNTER — NURSE ONLY (OUTPATIENT)
Dept: NON INVASIVE DIAGNOSTICS | Age: 86
End: 2021-08-03

## 2021-08-03 NOTE — PROGRESS NOTES
Patient was in today had 14 day Zio XT placed per Dr. Miguel Angel Conley. Patent was given instructions and questions answered, patient verbalized understanding.   Serial # [id number]    KOFI Garcia

## 2021-08-24 ENCOUNTER — OFFICE VISIT (OUTPATIENT)
Dept: FAMILY MEDICINE CLINIC | Age: 86
End: 2021-08-24
Payer: MEDICARE

## 2021-08-24 VITALS
TEMPERATURE: 97.7 F | BODY MASS INDEX: 31.32 KG/M2 | WEIGHT: 200 LBS | HEART RATE: 45 BPM | OXYGEN SATURATION: 98 % | DIASTOLIC BLOOD PRESSURE: 80 MMHG | SYSTOLIC BLOOD PRESSURE: 130 MMHG

## 2021-08-24 DIAGNOSIS — F32.9 REACTIVE DEPRESSION: ICD-10-CM

## 2021-08-24 DIAGNOSIS — I50.32 CHRONIC HEART FAILURE WITH PRESERVED EJECTION FRACTION (HCC): Primary | ICD-10-CM

## 2021-08-24 DIAGNOSIS — E78.2 MIXED HYPERLIPIDEMIA: ICD-10-CM

## 2021-08-24 DIAGNOSIS — I48.0 PAROXYSMAL ATRIAL FIBRILLATION (HCC): ICD-10-CM

## 2021-08-24 DIAGNOSIS — Z79.01 ANTICOAGULANT LONG-TERM USE: ICD-10-CM

## 2021-08-24 DIAGNOSIS — I10 ESSENTIAL HYPERTENSION: ICD-10-CM

## 2021-08-24 PROCEDURE — G8427 DOCREV CUR MEDS BY ELIG CLIN: HCPCS | Performed by: INTERNAL MEDICINE

## 2021-08-24 PROCEDURE — 1090F PRES/ABSN URINE INCON ASSESS: CPT | Performed by: INTERNAL MEDICINE

## 2021-08-24 PROCEDURE — 1123F ACP DISCUSS/DSCN MKR DOCD: CPT | Performed by: INTERNAL MEDICINE

## 2021-08-24 PROCEDURE — 99214 OFFICE O/P EST MOD 30 MIN: CPT | Performed by: INTERNAL MEDICINE

## 2021-08-24 PROCEDURE — 4040F PNEUMOC VAC/ADMIN/RCVD: CPT | Performed by: INTERNAL MEDICINE

## 2021-08-24 PROCEDURE — G8400 PT W/DXA NO RESULTS DOC: HCPCS | Performed by: INTERNAL MEDICINE

## 2021-08-24 PROCEDURE — G8417 CALC BMI ABV UP PARAM F/U: HCPCS | Performed by: INTERNAL MEDICINE

## 2021-08-24 PROCEDURE — 1036F TOBACCO NON-USER: CPT | Performed by: INTERNAL MEDICINE

## 2021-08-24 RX ORDER — BUMETANIDE 2 MG/1
2 TABLET ORAL DAILY
Qty: 30 TABLET | Refills: 5 | Status: SHIPPED
Start: 2021-08-24 | End: 2022-03-15 | Stop reason: SDUPTHER

## 2021-08-24 RX ORDER — LISINOPRIL 40 MG/1
TABLET ORAL
Qty: 90 TABLET | Refills: 1 | Status: SHIPPED
Start: 2021-08-24 | End: 2022-04-05

## 2021-08-24 RX ORDER — SIMVASTATIN 40 MG
40 TABLET ORAL NIGHTLY
Qty: 90 TABLET | Refills: 1 | Status: SHIPPED
Start: 2021-08-24 | End: 2021-10-19 | Stop reason: SDUPTHER

## 2021-08-24 RX ORDER — DULOXETIN HYDROCHLORIDE 30 MG/1
30 CAPSULE, DELAYED RELEASE ORAL DAILY
Qty: 30 CAPSULE | Refills: 5 | Status: SHIPPED
Start: 2021-08-24 | End: 2022-04-01

## 2021-08-24 ASSESSMENT — ENCOUNTER SYMPTOMS
EYES NEGATIVE: 1
ALLERGIC/IMMUNOLOGIC NEGATIVE: 1
RESPIRATORY NEGATIVE: 1
BACK PAIN: 1

## 2021-08-24 NOTE — PROGRESS NOTES
(2018) SLIP GIVEN  Medical Problems:  Hypertension, Hyperlipidemia  Atrial Fibrillation - (10/2014) POST SURGERY-[facility]  Abdominal Aortic Aneurysm - CT   Thoracic Back Pain/Scoliosis  Thoracic Radiculopathy - REFERRED TO [facility]  Chronic RLL Infiltate - [facility]  Lumbar Spinal Stenosis - [facility] 2/10 improved with therapy- REFERRED TO [facility] 13-recurrent- failed PT-MRI ordered 3/16/2021  Incisional Hernia - (2012) CAUSING PAIN  Carotid Artery Stenosis - ()  Depression- improved with Cymbalta 2021  CHF p EF 2021  Surgical Hx:  AAA - LAST CT , ORDERED 11/10-normal CT 2019  Tonsillectomy  Appendectomy - 2011 GANGRENOUS  Lumbar Surgery - (2014) [facility]  OB/Gyn Hx:: (4)Parity: Full term (3), one miscarriage  Reviewed, no changes. FH:  Father:  MI -  age 67. Mother:  Cerebrovascular Accident (CVA) -  age 80. Reviewed, no changes. SH:  Marital: . Personal Habits: Alcohol: Occasionally consumes wine. Exercise Type: Does housework daily. Reviewed, no changes. Date: 2021  Was the patient queried about smoking behavior? Yes   Does the patient currently smoke? Smoking: Patient is a former smoker. Prior to Visit Medications    Medication Sig Taking? Authorizing Provider   bumetanide (BUMEX) 2 MG tablet Take 1 tablet by mouth daily Yes Rohan Barker MD   HYDROcodone-acetaminophen (NORCO) 5-325 MG per tablet Take 1 tablet by mouth every 6 hours as needed for Pain for up to 30 days.  Yes Rohan Barker MD   apixaban (ELIQUIS) 5 MG TABS tablet Take 1 tablet by mouth 2 times daily Yes Rohan Barker MD   ezetimibe (ZETIA) 10 MG tablet TAKE 1 TABLET BY MOUTH EVERY DAY Yes Rohan Barker MD   simvastatin (ZOCOR) 40 MG tablet Take 1 tablet by mouth nightly Yes Rohan Barker MD   DULoxetine (CYMBALTA) 30 MG extended release capsule Take 1 capsule by mouth daily Yes Rohan Barker MD   lisinopril (PRINIVIL;ZESTRIL) 40 MG tablet take 1 tablet by mouth once daily Yes Aaron Randolph MD   metoprolol tartrate (LOPRESSOR) 50 MG tablet Take 1.5 tablets by mouth 2 times daily Yes Aaron Randolph MD   Cholecalciferol (VITAMIN D3) 2000 units CAPS Take by mouth daily Yes Historical Provider, MD   methylPREDNISolone (MEDROL DOSEPACK) 4 MG tablet Take by mouth. Patient not taking: Reported on 6/28/2021  Nicholas Penaloza DO   fluticasone (FLONASE) 50 MCG/ACT nasal spray 1 spray by Nasal route as needed for Rhinitis or Allergies  Aaron Randolph MD   Multiple Vitamins-Minerals (THERAPEUTIC MULTIVITAMIN-MINERALS) tablet Take 1 tablet by mouth daily. Historical Provider, MD   Calcium Carb-Cholecalciferol (CALCIUM + D3) 600-200 MG-UNIT TABS Take 1 tablet by mouth daily.   Historical Provider, MD        Allergies   Allergen Reactions    Amoxicillin-Pot Clavulanate Diarrhea    Crestor [Rosuvastatin] Other (See Comments)     Muscle fatigue    Lipitor [Atorvastatin] Other (See Comments)     Muscle fatigue    Sulfa Antibiotics Hives       Past Medical History:   Diagnosis Date    AAA (abdominal aortic aneurysm) (HCC)     Anticoagulant long-term use     Atrial fibrillation ([facility] [address]     Carotid artery stenosis     Hepatitis A     Hyperlipidemia     Hypertension     Incisional hernia     Spinal stenosis     Thoracic back pain     Thoracic back pain/Scoliosis    Thoracic radiculopathy     [facility]       Past Surgical History:   Procedure Laterality Date    [facility]BDOM[facility]IR  [facility]    APPENDECTOMY      CARDIOVERSION  10/28/2014    OTHER SURGICAL HISTORY      epidural injections in spine    SPINE SURGERY  10/2014    TONSILLECTOMY         Social History     Socioeconomic History    Marital status:      Spouse name: Not on file    Number of children: Not on file    Years of education: Not on file    Highest education level: Not on file   Occupational History    Not on file   Tobacco Use    Smoking status: Former Smoker     Packs/day: 1.00 Years: 15.00     Pack years: 15.00     Types: Cigarettes     Quit date: 1990     Years since quittin.8    Smokeless tobacco: Never Used   Substance and Sexual Activity    Alcohol use: Yes     Comment: rarely    Drug use: No    Sexual activity: Not on file   Other Topics Concern    Not on file   Social History Narrative    Not on file     Social Determinants of Health     Financial Resource Strain:     Difficulty of Paying Living Expenses:    Food Insecurity:     Worried About Running Out of Food in the Last Year:     [address] in the Last Year:    Transportation Needs:     Lack of Transportation (Medical):  Lack of Transportation (Non-Medical):    Physical Activity:     Days of Exercise per Week:     Minutes of Exercise per Session:    Stress:     Feeling of Stress :    Social Connections:     Frequency of Communication with Friends and Family:     Frequency of Social Gatherings with Friends and Family:     Attends Sabianist Services:     Active Member of Clubs or Organizations:     Attends Club or Organization Meetings:     Marital Status:    Intimate Partner Violence:     Fear of Current or Ex-Partner:     Emotionally Abused:     Physically Abused:     Sexually Abused:         Family History   Problem Relation Age of Onset    Stroke Mother     Heart Disease Father     Heart Disease Brother        Vitals:    21 1640   BP: 130/80   Pulse: (!) 45   Temp: 97.7 °F (36.5 °C)   SpO2: 98%   Weight: 200 lb (90.7 kg)     Estimated body mass index is 31.32 kg/m² as calculated from the following:    Height as of 21: 5' 7\" (1.702 m). Weight as of this encounter: 200 lb (90.7 kg). Physical Exam  Const: Appears healthy, well developed and well nourished. Appears obese. Eyes: EOMI in both eyes. PERRL. ENMT: External ears WNL. Tympanic membranes are intact. External nose WNL. Neck: Supple and symmetric. Palpation reveals no adenopathy. No masses appreciated. Thyroid: no nodule  appreciated or thyromegaly. No jugular venous distention. Resp: Respirations are unlabored. Respiration rate is normal. Auscultate good airflow. No rales, rhonchi or wheezes  appreciated over the lungs bilaterally. CV: Rhythm is regular. S1 is normal. S2 is normal. Carotids: no bruits. Abdominal aorta: not palpable. Pedal  pulses: 2+ and equal bilaterally. Extremities: No clubbing, cyanosis . Trace  edema below the mid tibia bilaterally. Slightly more prominent on the left than the right  Abdomen: Bowel sounds are normoactive. Palpation reveals softness, with no distension, organomegaly or  tenderness. No abdominal masses palpable. No palpable hepatosplenomegaly. Musculo: Walks with a limping gait. Upper Extremities: ROM: Significant limitation in range of motion of the left  shoulder. Lower Extremities: ROM: Crepitus bilaterally more extensive on the left. Skin: Dry and warm with no rash. Neuro: Alert and oriented x3. Mood is normal. Affect is normal. Speech is articulate and fluent. Deep tendon reflexes are absent both patellar and plantar bilaterally. No evidence of erythema, warmth, or fluctuance  Diagnoses and all orders for this visit:    Chronic heart failure with preserved ejection fraction ([facility] [address]  -     Comprehensive Metabolic Panel; Future  -     MAGNESIUM; Future  -     BRAIN NATRIURETIC PEPTIDE; Future    Essential hypertension  -     lisinopril (PRINIVIL;ZESTRIL) 40 MG tablet; One po daily    Paroxysmal atrial fibrillation (HCC)  -     Comprehensive Metabolic Panel; Future  -     MAGNESIUM; Future  -     BRAIN NATRIURETIC PEPTIDE; Future    Anticoagulant long-term use    Reactive depression  -     Comprehensive Metabolic Panel; Future  -     MAGNESIUM; Future  -     BRAIN NATRIURETIC PEPTIDE; Future    Mixed hyperlipidemia    Other orders  -     simvastatin (ZOCOR) 40 MG tablet; Take 1 tablet by mouth nightly  -     bumetanide (BUMEX) 2 MG tablet;  Take 1 tablet by mouth daily  -     DULoxetine (CYMBALTA) 30 MG extended release capsule; Take 1 capsule by mouth daily    I will review the echocardiogram and 14-day Holter monitor when it becomes available. Labs are ordered. Medication management is performed. Patient is to be seen back in 6 to 8 weeks to reassess status. At this point I am very pleased with changes that we have made.

## 2021-10-12 ENCOUNTER — OFFICE VISIT (OUTPATIENT)
Dept: FAMILY MEDICINE CLINIC | Age: 86
End: 2021-10-12
Payer: MEDICARE

## 2021-10-12 VITALS
DIASTOLIC BLOOD PRESSURE: 76 MMHG | BODY MASS INDEX: 31.61 KG/M2 | OXYGEN SATURATION: 97 % | HEART RATE: 116 BPM | SYSTOLIC BLOOD PRESSURE: 124 MMHG | WEIGHT: 201.4 LBS | RESPIRATION RATE: 18 BRPM | TEMPERATURE: 96.5 F | HEIGHT: 67 IN

## 2021-10-12 DIAGNOSIS — M79.671 PAIN OF RIGHT HEEL: Primary | ICD-10-CM

## 2021-10-12 DIAGNOSIS — M77.31 HEEL SPUR, RIGHT: ICD-10-CM

## 2021-10-12 PROCEDURE — G8427 DOCREV CUR MEDS BY ELIG CLIN: HCPCS | Performed by: NURSE PRACTITIONER

## 2021-10-12 PROCEDURE — G8417 CALC BMI ABV UP PARAM F/U: HCPCS | Performed by: NURSE PRACTITIONER

## 2021-10-12 PROCEDURE — 1036F TOBACCO NON-USER: CPT | Performed by: NURSE PRACTITIONER

## 2021-10-12 PROCEDURE — G8484 FLU IMMUNIZE NO ADMIN: HCPCS | Performed by: NURSE PRACTITIONER

## 2021-10-12 PROCEDURE — 1123F ACP DISCUSS/DSCN MKR DOCD: CPT | Performed by: NURSE PRACTITIONER

## 2021-10-12 PROCEDURE — 4040F PNEUMOC VAC/ADMIN/RCVD: CPT | Performed by: NURSE PRACTITIONER

## 2021-10-12 PROCEDURE — 99213 OFFICE O/P EST LOW 20 MIN: CPT | Performed by: NURSE PRACTITIONER

## 2021-10-12 PROCEDURE — 1090F PRES/ABSN URINE INCON ASSESS: CPT | Performed by: NURSE PRACTITIONER

## 2021-10-12 PROCEDURE — G8400 PT W/DXA NO RESULTS DOC: HCPCS | Performed by: NURSE PRACTITIONER

## 2021-10-12 RX ORDER — METHYLPREDNISOLONE 4 MG/1
TABLET ORAL
Qty: 1 KIT | Refills: 0 | Status: SHIPPED
Start: 2021-10-12 | End: 2021-10-19

## 2021-10-12 NOTE — PROGRESS NOTES
Chief Complaint:   Foot Pain (patient has heel spurs. . within the last few months there has been some edema. Sherryle Moder and tender ness and discomfort to mid heel or right foot )    History of Present Illness   Source of history provided by:  patient. Jannie King is a 82 y.o. old female presenting to walk-in for evaluation of right foot pain for the last few months. Denies any known traumatic injury that occurred and notes pain in the posterior foot. Reports she has heel spurs, has had this pain in the past, had a medrol dosepack for the symptoms which helped. Denies associated swelling and bruising. Denies any paresthesias, knee pain, weakness, fever, chills, or abrasions. Pt states there is mild pain with ambulation. Has been taking Advil OTC with minimal symptomatic relief. Denies any hx of previous injuries or surgeries at the site. Review of Systems   Unless otherwise stated in this report or unable to obtain because of the patient's clinical or mental status as evidenced by the medical record, this patients's positive and negative responses for Review of Systems, constitutional, psych, eyes, ENT, cardiovascular, respiratory, gastrointestinal, neurological, genitourinary, musculoskeletal, integument systems and systems related to the presenting problem are either stated in the preceding or were negative for the symptoms and/or complaints related to the medical problem. Past Medical History:  has a past medical history of AAA (abdominal aortic aneurysm) ([address] Anticoagulant long-term use, Atrial fibrillation ([address] Carotid artery stenosis, Hepatitis A, Hyperlipidemia, Hypertension, Incisional hernia, Spinal stenosis, Thoracic back pain, and Thoracic radiculopathy. Past Surgical History:  has a past surgical history that includes Spine surgery (10/2014); Tonsillectomy; Abdominal aortic aneurysm repair (2005); Appendectomy; Cardioversion (10/28/2014); and other surgical history.   Social History:  reports that she quit smoking about 30 years ago. Her smoking use included cigarettes. She has a 15.00 pack-year smoking history. She has never used smokeless tobacco. She reports current alcohol use. She reports that she does not use drugs. Family History: family history includes Heart Disease in her brother and father; Stroke in her mother. Allergies: Amoxicillin-pot clavulanate, Crestor [rosuvastatin], Lipitor [atorvastatin], and Sulfa antibiotics    Physical Exam   Vital Signs: /76   Pulse 116   Temp 96.5 °F (35.8 °C)   Resp 18   Ht 5' 7\" (1.702 m)   Wt 201 lb 6.4 oz (91.4 kg)   SpO2 97%   BMI 31.54 kg/m²  Oxygen Saturation Interpretation: Normal.    Constitutional:  Alert, development consistent with age. Neck:  Normal ROM. Supple. Chest: Regular rate and rhythm without pathologic murmurs or gallops. Lungs CTAB without wheezing, rales or rhonchi. Foot: right            Tenderness: To posterior heel            Swelling: None note              Deformity: No obvious deformity. ROM: Limited ROM due to pain. Skin: No rash, abrasions, or erythema noted. Neurovascular:              Sensory deficit: Sensation intact proximal and distal to the injury site. Pulse deficit: Pulses 2+ and bounding. Capillary refill: Less then 2 sec throughout. Ankle: right            Tenderness:  None              Swelling: None            Deformity: No obvious deformity noted. ROM: Full ROM. Skin: No abrasions, erythema, or rashes noted. Gait: Antalgic gait noted. Lymphatics: No lymphangitis or adenopathy noted. Neurological:  Alert and oriented. Motor functions intact. Test Results Section   Radiology: All Radiology results interpreted by Radiologist unless otherwise noted. No results found. Assessment / Plan   Impression:   Jonas Lee was seen today for foot pain.     Diagnoses and all orders for this visit:    Pain of right heel  - methylPREDNISolone (MEDROL DOSEPACK) 4 MG tablet; Take by mouth. -     Juan Carlos Coughlin DPM, Podiatry, [place]    Heel spur, right  -     methylPREDNISolone (MEDROL DOSEPACK) 4 MG tablet; Take by mouth. -     Sugar Hoyt DPM, Podiatry, [facility] written for Medrol dosepack, side effects discussed. Referral placed to Podiatry, Dr. Nevin Guevara, for further eval and treatment. RICE protocol advised. F/u PCP in 5-7 days for recheck. ED sooner if symptoms worsen or change. ED immediately with worsening pain/swelling, calf pain/swelling, fever, paresthesias, weakness, CP, or SOB. Pt is in agreement with this care plan. All questions answered. Return if symptoms worsen or fail to improve. Electronically signed by MAMTA Villanueva CNP   DD: 10/12/21    **This report was transcribed using voice recognition software. Every effort was made to ensure accuracy; however, inadvertent computerized transcription errors may be present.

## 2021-10-13 RX ORDER — APIXABAN 5 MG/1
TABLET, FILM COATED ORAL
Qty: 60 TABLET | Refills: 5 | Status: SHIPPED
Start: 2021-10-13 | End: 2022-03-31

## 2021-10-13 NOTE — TELEPHONE ENCOUNTER
Last Appointment:  8/24/2021  Future Appointments   Date Time Provider Orlando Rodriguez   10/19/2021 10:00 AM Vincent Shone,  [address] [place] [address]

## 2021-10-19 ENCOUNTER — OFFICE VISIT (OUTPATIENT)
Dept: PODIATRY | Age: 86
End: 2021-10-19
Payer: MEDICARE

## 2021-10-19 ENCOUNTER — OFFICE VISIT (OUTPATIENT)
Dept: FAMILY MEDICINE CLINIC | Age: 86
End: 2021-10-19
Payer: MEDICARE

## 2021-10-19 VITALS
TEMPERATURE: 97.7 F | WEIGHT: 195 LBS | OXYGEN SATURATION: 97 % | DIASTOLIC BLOOD PRESSURE: 72 MMHG | BODY MASS INDEX: 30.54 KG/M2 | SYSTOLIC BLOOD PRESSURE: 124 MMHG | HEART RATE: 92 BPM

## 2021-10-19 VITALS — WEIGHT: 201 LBS | HEIGHT: 67 IN | BODY MASS INDEX: 31.55 KG/M2

## 2021-10-19 DIAGNOSIS — Z79.01 ANTICOAGULANT LONG-TERM USE: ICD-10-CM

## 2021-10-19 DIAGNOSIS — E78.2 MIXED HYPERLIPIDEMIA: ICD-10-CM

## 2021-10-19 DIAGNOSIS — M72.2 PLANTAR FASCIITIS: ICD-10-CM

## 2021-10-19 DIAGNOSIS — I48.0 PAROXYSMAL ATRIAL FIBRILLATION (HCC): ICD-10-CM

## 2021-10-19 DIAGNOSIS — I10 ESSENTIAL HYPERTENSION: ICD-10-CM

## 2021-10-19 DIAGNOSIS — Z79.01 ENCOUNTER FOR CURRENT LONG-TERM USE OF ANTICOAGULANTS: ICD-10-CM

## 2021-10-19 DIAGNOSIS — I50.32 CHRONIC HEART FAILURE WITH PRESERVED EJECTION FRACTION (HCC): Primary | ICD-10-CM

## 2021-10-19 DIAGNOSIS — M79.671 PAIN IN RIGHT FOOT: Primary | ICD-10-CM

## 2021-10-19 PROCEDURE — G8400 PT W/DXA NO RESULTS DOC: HCPCS | Performed by: INTERNAL MEDICINE

## 2021-10-19 PROCEDURE — G8417 CALC BMI ABV UP PARAM F/U: HCPCS | Performed by: INTERNAL MEDICINE

## 2021-10-19 PROCEDURE — 1123F ACP DISCUSS/DSCN MKR DOCD: CPT | Performed by: INTERNAL MEDICINE

## 2021-10-19 PROCEDURE — G8427 DOCREV CUR MEDS BY ELIG CLIN: HCPCS | Performed by: PODIATRIST

## 2021-10-19 PROCEDURE — G8484 FLU IMMUNIZE NO ADMIN: HCPCS | Performed by: INTERNAL MEDICINE

## 2021-10-19 PROCEDURE — 1090F PRES/ABSN URINE INCON ASSESS: CPT | Performed by: PODIATRIST

## 2021-10-19 PROCEDURE — G8400 PT W/DXA NO RESULTS DOC: HCPCS | Performed by: PODIATRIST

## 2021-10-19 PROCEDURE — 99203 OFFICE O/P NEW LOW 30 MIN: CPT | Performed by: PODIATRIST

## 2021-10-19 PROCEDURE — 99214 OFFICE O/P EST MOD 30 MIN: CPT | Performed by: INTERNAL MEDICINE

## 2021-10-19 PROCEDURE — G8427 DOCREV CUR MEDS BY ELIG CLIN: HCPCS | Performed by: INTERNAL MEDICINE

## 2021-10-19 PROCEDURE — G8417 CALC BMI ABV UP PARAM F/U: HCPCS | Performed by: PODIATRIST

## 2021-10-19 PROCEDURE — G8484 FLU IMMUNIZE NO ADMIN: HCPCS | Performed by: PODIATRIST

## 2021-10-19 PROCEDURE — 1036F TOBACCO NON-USER: CPT | Performed by: PODIATRIST

## 2021-10-19 PROCEDURE — 4040F PNEUMOC VAC/ADMIN/RCVD: CPT | Performed by: INTERNAL MEDICINE

## 2021-10-19 PROCEDURE — 1036F TOBACCO NON-USER: CPT | Performed by: INTERNAL MEDICINE

## 2021-10-19 PROCEDURE — 1123F ACP DISCUSS/DSCN MKR DOCD: CPT | Performed by: PODIATRIST

## 2021-10-19 PROCEDURE — 4040F PNEUMOC VAC/ADMIN/RCVD: CPT | Performed by: PODIATRIST

## 2021-10-19 PROCEDURE — 1090F PRES/ABSN URINE INCON ASSESS: CPT | Performed by: INTERNAL MEDICINE

## 2021-10-19 RX ORDER — SIMVASTATIN 40 MG
40 TABLET ORAL NIGHTLY
Qty: 90 TABLET | Refills: 1 | Status: SHIPPED
Start: 2021-10-19 | End: 2022-04-12 | Stop reason: SDUPTHER

## 2021-10-19 ASSESSMENT — ENCOUNTER SYMPTOMS
EYES NEGATIVE: 1
BACK PAIN: 1
RESPIRATORY NEGATIVE: 1
ALLERGIC/IMMUNOLOGIC NEGATIVE: 1

## 2021-10-19 NOTE — PROGRESS NOTES
New patient in office with c/o right foot pain. Xrays obtained in may 2021 which showed heel spurs. Denies any injury. Sx x 6 months. Completed oral steroid last week. Jose Covington : 1935 Sex: female  Age: 82 y.o. Patient was referred by Nataly Cruz MD    CC:    Right foot pain    HPI:   This pleasant 53-year-old female patient referred me today right heel pain. Has had symptoms for several months. Did have radiographs in May which did show mild heel spurring. Was started on oral steroid last week. No heel pain today. Does have history long-term anticoagulant therapy with Eliquis. No calf pain. States she still want to keep her appointment but is not having pain today. No additional pedal complaints at this time. ROS:  Const: Denies constitutional symptoms  Musculo: Denies symptoms other than stated above  Skin: Denies symptoms other than stated above       Current Outpatient Medications:     simvastatin (ZOCOR) 40 MG tablet, Take 1 tablet by mouth nightly, Disp: 90 tablet, Rfl: 1    ELIQUIS 5 MG TABS tablet, take 1 tablet by mouth twice a day, Disp: 60 tablet, Rfl: 5    lisinopril (PRINIVIL;ZESTRIL) 40 MG tablet, One po daily, Disp: 90 tablet, Rfl: 1    bumetanide (BUMEX) 2 MG tablet, Take 1 tablet by mouth daily, Disp: 30 tablet, Rfl: 5    DULoxetine (CYMBALTA) 30 MG extended release capsule, Take 1 capsule by mouth daily, Disp: 30 capsule, Rfl: 5    ezetimibe (ZETIA) 10 MG tablet, TAKE 1 TABLET BY MOUTH EVERY DAY, Disp: 90 tablet, Rfl: 1    metoprolol tartrate (LOPRESSOR) 50 MG tablet, Take 1.5 tablets by mouth 2 times daily, Disp: 270 tablet, Rfl: 3    fluticasone (FLONASE) 50 MCG/ACT nasal spray, 1 spray by Nasal route as needed for Rhinitis or Allergies, Disp: 1 Bottle, Rfl: 5    Cholecalciferol (VITAMIN D3) 2000 units CAPS, Take by mouth daily, Disp: , Rfl:     Multiple Vitamins-Minerals (THERAPEUTIC MULTIVITAMIN-MINERALS) tablet, Take 1 tablet by mouth daily. , Disp: , Rfl:   Calcium Carb-Cholecalciferol (CALCIUM + D3) 600-200 MG-UNIT TABS, Take 1 tablet by mouth daily. , Disp: , Rfl:   Allergies   Allergen Reactions    Amoxicillin-Pot Clavulanate Diarrhea    Crestor [Rosuvastatin] Other (See Comments)     Muscle fatigue    Lipitor [Atorvastatin] Other (See Comments)     Muscle fatigue    Sulfa Antibiotics Hives       Past Medical History:   Diagnosis Date    AAA (abdominal aortic aneurysm) (HCC)     Anticoagulant long-term use     Atrial fibrillation (HCC)     Carotid artery stenosis     Hepatitis A     Hyperlipidemia     Hypertension     Incisional hernia     Spinal stenosis     Thoracic back pain     Thoracic back pain/Scoliosis    Thoracic radiculopathy     Referred to Chris Smith:    10/19/21 0923   Weight: 201 lb (91.2 kg)   Height: 5' 7\" (1.702 m)       Work History/Social History: Foot and ankle history:     Focused Lower Extremity Physical Exam:    Neurovascular examination:    Dorsalis Pedis palpable bilateral.  Posterior tibialis palpable bilateral.    Capillary Refill Time:  Immediate return  Hair growth:  Symmetrical and bilateral   Skin:  Not atrophic  Edema: No edema bilateral feet or ankles. Neurologic:  Light touch intact bilateral.      Musculoskeletal/ Orthopedic examination:    Equinis: Absent bilateral  Dorsiflexion, plantarflexion, inversion, eversion bilateral 5 out of 5 muscle strength  Wiggling toes  Negative Homans  No pain to palpation bilateral plantar fascia. No pain insertion Achilles tendon bilateral. Negative Lubin. Negative calcaneal squeeze test.      Dermatology examination:    No open skin lesions or abrasions bilateral lower extremity. Assessment and Plan:  Daylin Reyes was seen today for foot pain. Diagnoses and all orders for this visit:    Pain in right foot    Plantar fasciitis    Encounter for current long-term use of anticoagulants        Radiographs from 5/18/2021 reviewed.   Does have 4 mm plantar calcaneal

## 2021-10-19 NOTE — PROGRESS NOTES
10/19/2021    Sharon Hernandez (:  1935) is a 82 y.o. female, here for evaluation of the following medical concerns:    I did review the echocardiogram and Holter monitor for the patient. The patient does not have a episodes of rapid atrial fibrillation. The patient does have preserved ejection fraction. I did once again write down parameters in terms of fluid restriction and salt restriction. Patient is denying orthopnea or PND. There is been no increase in peripheral lymphedema. Patient has lost about 6 pounds over the last 2 months. We will need to make sure that her kidney function has remained stable. Initial blood pressure was elevated. On repeat it was 124/72. Patient is denying pleuritic pain. The duloxetine does seem to be helping quite a bit in terms of her pain issues with arthritic complaints. Other    Back Pain    Hyperlipidemia    Hypertension          Review of Systems   Constitutional: Negative. HENT: Negative. Eyes: Negative. Respiratory: Negative. Cardiovascular:        Atrial fibrillation without evidence of rapid ventricular response. Improved symptomatology with increased diuresis   Gastrointestinal:        Denies abdominal pain. Endocrine:        Glucose intolerance. Last hemoglobin A1c was 6.1   Genitourinary: Negative. Musculoskeletal: Positive for back pain. Thoracic and lumbar spine pain. Generalized osteoarthritis pain. Improved symptoms since being placed on duloxetine. Allergic/Immunologic: Negative. Neurological: Negative. Hematological: Negative. Psychiatric/Behavioral:        Improved depression with the use of Cymbalta.      Problem List: Malaise and fatigue, Essential hypertension, Hyperlipidemia  Health Maintenance:  Colonoscopy - (2014)  Bone Density Test Screening - (2009)  Couseled on Home Safety - (10/12/2015)  Influenza Vaccination - (2020)  Tdap - (2017) DISCUSSED  Zoster/Shingles Vaccine - (2017) DISCUSSED  Shingrix Vaccine (Shingles) - (2018) SLIP GIVEN  Medical Problems:  Hypertension, Hyperlipidemia  Atrial Fibrillation - (10/2014) POST SURGERY-[place]  Abdominal Aortic Aneurysm - CT   Thoracic Back Pain/Scoliosis  Thoracic Radiculopathy - REFERRED TO [facility]  Chronic RLL Infiltate - [facility]  Lumbar Spinal Stenosis - [facility] 2/10 improved with therapy- REFERRED TO [facility] 13-recurrent- failed PT-MRI ordered 3/16/2021  Incisional Hernia - (2012) CAUSING PAIN  Carotid Artery Stenosis - ()  Depression- improved with Cymbalta 2021  CHF p EF 2021  Surgical Hx:  AAA - LAST CT , ORDERED 11/10-normal CT 2019  Tonsillectomy  Appendectomy - 2011 GANGRENOUS  Lumbar Surgery - (2014) [facility]  OB/Gyn Hx:: (4)Parity: Full term (3), one miscarriage  Reviewed, no changes. FH:  Father:  MI -  age 67. Mother:  Cerebrovascular Accident (CVA) -  age 80. Reviewed, no changes. SH:  Marital: . Personal Habits: Alcohol: Occasionally consumes wine. Exercise Type: Does housework daily. Reviewed, no changes. Date: 2021  Was the patient queried about smoking behavior? Yes   Does the patient currently smoke? Smoking: Patient is a former smoker. Prior to Visit Medications    Medication Sig Taking? Authorizing Provider   simvastatin (ZOCOR) 40 MG tablet Take 1 tablet by mouth nightly Yes Charisma Bruce MD   ELIQUIS 5 MG TABS tablet take 1 tablet by mouth twice a day Yes Charisma Bruce MD   lisinopril (PRINIVIL;ZESTRIL) 40 MG tablet One po daily Yes Charisma Bruce MD   bumetanide (BUMEX) 2 MG tablet Take 1 tablet by mouth daily Yes Charisma Bruce MD   DULoxetine (CYMBALTA) 30 MG extended release capsule Take 1 capsule by mouth daily Yes Charisma Bruce MD   HYDROcodone-acetaminophen (NORCO) 5-325 MG per tablet Take 1 tablet by mouth every 6 hours as needed for Pain for up to 30 days.  Yes Charisma Bruce MD   ezetimibe (ZETIA) 10 MG tablet TAKE 1 TABLET BY MOUTH EVERY DAY Yes Charisma Bruce MD   metoprolol tartrate (LOPRESSOR) 50 MG tablet Take 1.5 tablets by mouth 2 times daily Yes Charisma Bruce MD   fluticasone (FLONASE) 50 MCG/ACT nasal spray 1 spray by Nasal route as needed for Rhinitis or Allergies  Charisma Bruce MD   Cholecalciferol (VITAMIN D3) 2000 units CAPS Take by mouth daily  Historical Provider, MD   Multiple Vitamins-Minerals (THERAPEUTIC MULTIVITAMIN-MINERALS) tablet Take 1 tablet by mouth daily. Historical Provider, MD   Calcium Carb-Cholecalciferol (CALCIUM + D3) 600-200 MG-UNIT TABS Take 1 tablet by mouth daily.   Historical Provider, MD        Allergies   Allergen Reactions    Amoxicillin-Pot Clavulanate Diarrhea    Crestor [Rosuvastatin] Other (See Comments)     Muscle fatigue    Lipitor [Atorvastatin] Other (See Comments)     Muscle fatigue    Sulfa Antibiotics Hives       Past Medical History:   Diagnosis Date    AAA (abdominal aortic aneurysm) (HCC)     Anticoagulant long-term use     Atrial fibrillation ([facility] [address]     Carotid artery stenosis     Hepatitis A     Hyperlipidemia     Hypertension     Incisional hernia     Spinal stenosis     Thoracic back pain     Thoracic back pain/Scoliosis    Thoracic radiculopathy     [facility]erred [facility]       Past Surgical History:   Procedure Laterality Date    [facility]    APPENDECTOMY      CARDIOVERSION  10/28/2014    OTHER SURGICAL HISTORY      epidural injections in spine    SPINE SURGERY  10/2014    TONSILLECTOMY         Social History     Socioeconomic History    Marital status:      Spouse name: Not on file    Number of children: Not on file    Years of education: Not on file    Highest education level: Not on file   Occupational History    Not on file   Tobacco Use    Smoking status: Former Smoker     Packs/day: 1.00     Years: 15.00     Pack years: 15.00     Types: Cigarettes     Quit date: 11/4/1990     Years since quitting: 30.9    Smokeless tobacco: Never Used   Substance and Sexual Activity    Alcohol use: Yes     Comment: rarely    Drug use: No    Sexual activity: Not on file   Other Topics Concern    Not on file   Social History Narrative    Not on file     Social Determinants of Health     Financial Resource Strain:     Difficulty of Paying Living Expenses:    Food Insecurity:     Worried About Running Out of Food in the Last Year:     [address] in the Last Year:    Transportation Needs:     Lack of Transportation (Medical):  Lack of Transportation (Non-Medical):    Physical Activity:     Days of Exercise per Week:     Minutes of Exercise per Session:    Stress:     Feeling of Stress :    Social Connections:     Frequency of Communication with Friends and Family:     Frequency of Social Gatherings with Friends and Family:     Attends Mandaeism Services:     Active Member of Clubs or Organizations:     Attends Club or Organization Meetings:     Marital Status:    Intimate Partner Violence:     Fear of Current or Ex-Partner:     Emotionally Abused:     Physically Abused:     Sexually Abused:         Family History   Problem Relation Age of Onset    Stroke Mother     Heart Disease Father     Heart Disease Brother        Vitals:    10/19/21 0955   BP: (!) 160/90   Pulse: 92   Temp: 97.7 °F (36.5 °C)   SpO2: 97%   Weight: 195 lb (88.5 kg)     Estimated body mass index is 30.54 kg/m² as calculated from the following:    Height as of an earlier encounter on 10/19/21: 5' 7\" (1.702 m). Weight as of this encounter: 195 lb (88.5 kg). Physical Exam  Const: Appears healthy, well developed and well nourished. Appears obese. Eyes: EOMI in both eyes. PERRL. ENMT: External ears WNL. Tympanic membranes are intact. External nose WNL. Neck: Supple and symmetric. Palpation reveals no adenopathy. No masses appreciated. Thyroid: no nodule  appreciated or thyromegaly. No jugular venous distention.   Resp: Respirations are unlabored. Respiration rate is normal. Auscultate good airflow. Trace crackles left base but none on the right. No dullness to percussion. CV: Rhythm is regular. S1 is normal. S2 is normal. Carotids: no bruits. Abdominal aorta: not palpable. Pedal  pulses: 2+ and equal bilaterally. Extremities: No clubbing, cyanosis . Trace  edema below the mid tibia bilaterally. Slightly more prominent on the left than the right  Abdomen: Bowel sounds are normoactive. Palpation reveals softness, with no distension, organomegaly or  tenderness. No abdominal masses palpable. No palpable hepatosplenomegaly. Musculo: Walks with a limping gait. Upper Extremities: ROM: Significant limitation in range of motion of the left  shoulder. Lower Extremities: ROM: Crepitus bilaterally more extensive on the left. Skin: Dry and warm with no rash. Neuro: Alert and oriented x3. Mood is normal. Affect is normal. Speech is articulate and fluent. Deep tendon reflexes are absent both patellar and plantar bilaterally. No evidence of erythema, warmth, or fluctuance  Diagnoses and all orders for this visit:    Chronic heart failure with preserved ejection fraction ([facility] [address]  -     Comprehensive Metabolic Panel; Future  -     MAGNESIUM; Future    Essential hypertension  -     Comprehensive Metabolic Panel; Future  -     MAGNESIUM; Future    Paroxysmal atrial fibrillation (HCC)    Anticoagulant long-term use  -     Comprehensive Metabolic Panel; Future  -     MAGNESIUM; Future    Mixed hyperlipidemia  -     Lipid Panel; Future    Other orders  -     simvastatin (ZOCOR) 40 MG tablet; Take 1 tablet by mouth nightly    Written instructions were given today regarding salt and fluid restriction. The patient will have lab work. We did discuss when she goes to [place] she could expect some weight gain from fluid. She will need to take a scale and monitor her weight on a daily basis. Lab work is obtained.   The patient is to be back in 2 months prior to her departure for [place].

## 2021-12-21 ENCOUNTER — OFFICE VISIT (OUTPATIENT)
Dept: FAMILY MEDICINE CLINIC | Age: 86
End: 2021-12-21
Payer: MEDICARE

## 2021-12-21 ENCOUNTER — OFFICE VISIT (OUTPATIENT)
Dept: PODIATRY | Age: 86
End: 2021-12-21
Payer: MEDICARE

## 2021-12-21 VITALS
WEIGHT: 200 LBS | HEART RATE: 87 BPM | SYSTOLIC BLOOD PRESSURE: 130 MMHG | OXYGEN SATURATION: 99 % | BODY MASS INDEX: 31.32 KG/M2 | TEMPERATURE: 98 F | DIASTOLIC BLOOD PRESSURE: 80 MMHG

## 2021-12-21 VITALS — WEIGHT: 195 LBS | BODY MASS INDEX: 30.61 KG/M2 | HEIGHT: 67 IN

## 2021-12-21 DIAGNOSIS — Z79.01 ENCOUNTER FOR CURRENT LONG-TERM USE OF ANTICOAGULANTS: ICD-10-CM

## 2021-12-21 DIAGNOSIS — Z79.01 ANTICOAGULANT LONG-TERM USE: ICD-10-CM

## 2021-12-21 DIAGNOSIS — I50.32 CHRONIC HEART FAILURE WITH PRESERVED EJECTION FRACTION (HCC): Primary | ICD-10-CM

## 2021-12-21 DIAGNOSIS — E78.2 MIXED HYPERLIPIDEMIA: ICD-10-CM

## 2021-12-21 DIAGNOSIS — M72.2 PLANTAR FASCIITIS: ICD-10-CM

## 2021-12-21 DIAGNOSIS — I48.0 PAROXYSMAL ATRIAL FIBRILLATION (HCC): ICD-10-CM

## 2021-12-21 DIAGNOSIS — M19.90 ARTHRITIS: ICD-10-CM

## 2021-12-21 DIAGNOSIS — I10 ESSENTIAL HYPERTENSION: ICD-10-CM

## 2021-12-21 DIAGNOSIS — I50.32 CHRONIC HEART FAILURE WITH PRESERVED EJECTION FRACTION (HCC): ICD-10-CM

## 2021-12-21 DIAGNOSIS — M79.671 PAIN IN RIGHT FOOT: Primary | ICD-10-CM

## 2021-12-21 LAB
ALBUMIN SERPL-MCNC: 4.1 G/DL (ref 3.5–5.2)
ALP BLD-CCNC: 50 U/L (ref 35–104)
ALT SERPL-CCNC: 10 U/L (ref 0–32)
ANION GAP SERPL CALCULATED.3IONS-SCNC: 14 MMOL/L (ref 7–16)
AST SERPL-CCNC: 20 U/L (ref 0–31)
BASOPHILS ABSOLUTE: 0.04 E9/L (ref 0–0.2)
BASOPHILS RELATIVE PERCENT: 0.4 % (ref 0–2)
BILIRUB SERPL-MCNC: 0.6 MG/DL (ref 0–1.2)
BUN BLDV-MCNC: 24 MG/DL (ref 6–23)
CALCIUM SERPL-MCNC: 9.9 MG/DL (ref 8.6–10.2)
CHLORIDE BLD-SCNC: 101 MMOL/L (ref 98–107)
CO2: 24 MMOL/L (ref 22–29)
CREAT SERPL-MCNC: 1.2 MG/DL (ref 0.5–1)
EOSINOPHILS ABSOLUTE: 0.28 E9/L (ref 0.05–0.5)
EOSINOPHILS RELATIVE PERCENT: 3 % (ref 0–6)
GFR AFRICAN AMERICAN: 52
GFR NON-AFRICAN AMERICAN: 43 ML/MIN/1.73
GLUCOSE BLD-MCNC: 77 MG/DL (ref 74–99)
HCT VFR BLD CALC: 47.9 % (ref 34–48)
HEMOGLOBIN: 14.5 G/DL (ref 11.5–15.5)
IMMATURE GRANULOCYTES #: 0.03 E9/L
IMMATURE GRANULOCYTES %: 0.3 % (ref 0–5)
LYMPHOCYTES ABSOLUTE: 2.56 E9/L (ref 1.5–4)
LYMPHOCYTES RELATIVE PERCENT: 27.7 % (ref 20–42)
MCH RBC QN AUTO: 27.9 PG (ref 26–35)
MCHC RBC AUTO-ENTMCNC: 30.3 % (ref 32–34.5)
MCV RBC AUTO: 92.3 FL (ref 80–99.9)
MONOCYTES ABSOLUTE: 0.85 E9/L (ref 0.1–0.95)
MONOCYTES RELATIVE PERCENT: 9.2 % (ref 2–12)
NEUTROPHILS ABSOLUTE: 5.49 E9/L (ref 1.8–7.3)
NEUTROPHILS RELATIVE PERCENT: 59.4 % (ref 43–80)
PDW BLD-RTO: 15.3 FL (ref 11.5–15)
PLATELET # BLD: 217 E9/L (ref 130–450)
PMV BLD AUTO: 10.6 FL (ref 7–12)
POTASSIUM SERPL-SCNC: 4.6 MMOL/L (ref 3.5–5)
PRO-BNP: 3063 PG/ML (ref 0–450)
RBC # BLD: 5.19 E12/L (ref 3.5–5.5)
SODIUM BLD-SCNC: 139 MMOL/L (ref 132–146)
TOTAL PROTEIN: 7.4 G/DL (ref 6.4–8.3)
WBC # BLD: 9.3 E9/L (ref 4.5–11.5)

## 2021-12-21 PROCEDURE — 1090F PRES/ABSN URINE INCON ASSESS: CPT | Performed by: PODIATRIST

## 2021-12-21 PROCEDURE — 1090F PRES/ABSN URINE INCON ASSESS: CPT | Performed by: INTERNAL MEDICINE

## 2021-12-21 PROCEDURE — 1036F TOBACCO NON-USER: CPT | Performed by: INTERNAL MEDICINE

## 2021-12-21 PROCEDURE — G8427 DOCREV CUR MEDS BY ELIG CLIN: HCPCS | Performed by: PODIATRIST

## 2021-12-21 PROCEDURE — 4040F PNEUMOC VAC/ADMIN/RCVD: CPT | Performed by: INTERNAL MEDICINE

## 2021-12-21 PROCEDURE — 99214 OFFICE O/P EST MOD 30 MIN: CPT | Performed by: INTERNAL MEDICINE

## 2021-12-21 PROCEDURE — G8417 CALC BMI ABV UP PARAM F/U: HCPCS | Performed by: PODIATRIST

## 2021-12-21 PROCEDURE — G8400 PT W/DXA NO RESULTS DOC: HCPCS | Performed by: INTERNAL MEDICINE

## 2021-12-21 PROCEDURE — G8427 DOCREV CUR MEDS BY ELIG CLIN: HCPCS | Performed by: INTERNAL MEDICINE

## 2021-12-21 PROCEDURE — 1036F TOBACCO NON-USER: CPT | Performed by: PODIATRIST

## 2021-12-21 PROCEDURE — G8484 FLU IMMUNIZE NO ADMIN: HCPCS | Performed by: INTERNAL MEDICINE

## 2021-12-21 PROCEDURE — 4040F PNEUMOC VAC/ADMIN/RCVD: CPT | Performed by: PODIATRIST

## 2021-12-21 PROCEDURE — 1123F ACP DISCUSS/DSCN MKR DOCD: CPT | Performed by: PODIATRIST

## 2021-12-21 PROCEDURE — 99213 OFFICE O/P EST LOW 20 MIN: CPT | Performed by: PODIATRIST

## 2021-12-21 PROCEDURE — 1123F ACP DISCUSS/DSCN MKR DOCD: CPT | Performed by: INTERNAL MEDICINE

## 2021-12-21 PROCEDURE — G8417 CALC BMI ABV UP PARAM F/U: HCPCS | Performed by: INTERNAL MEDICINE

## 2021-12-21 PROCEDURE — G8484 FLU IMMUNIZE NO ADMIN: HCPCS | Performed by: PODIATRIST

## 2021-12-21 PROCEDURE — G8400 PT W/DXA NO RESULTS DOC: HCPCS | Performed by: PODIATRIST

## 2021-12-21 RX ORDER — HYDROCODONE BITARTRATE AND ACETAMINOPHEN 5; 325 MG/1; MG/1
1 TABLET ORAL EVERY 6 HOURS PRN
Qty: 120 TABLET | Refills: 0 | Status: SHIPPED | OUTPATIENT
Start: 2021-12-21 | End: 2022-04-12

## 2021-12-21 RX ORDER — EZETIMIBE 10 MG/1
TABLET ORAL
Qty: 90 TABLET | Refills: 1 | Status: SHIPPED
Start: 2021-12-21 | End: 2022-07-05

## 2021-12-21 ASSESSMENT — ENCOUNTER SYMPTOMS
EYES NEGATIVE: 1
RESPIRATORY NEGATIVE: 1
ALLERGIC/IMMUNOLOGIC NEGATIVE: 1
BACK PAIN: 1

## 2021-12-21 NOTE — PROGRESS NOTES
Allergies   Allergen Reactions    Amoxicillin-Pot Clavulanate Diarrhea    Crestor [Rosuvastatin] Other (See Comments)     Muscle fatigue    Lipitor [Atorvastatin] Other (See Comments)     Muscle fatigue    Sulfa Antibiotics Hives       Past Medical History:   Diagnosis Date    AAA (abdominal aortic aneurysm) (HCC)     Anticoagulant long-term use     Atrial fibrillation (HCC)     Carotid artery stenosis     Hepatitis A     Hyperlipidemia     Hypertension     Incisional hernia     Spinal stenosis     Thoracic back pain     Thoracic back pain/Scoliosis    Thoracic radiculopathy     Referred to [facility]           Vitals:    12/21/21 0935   Weight: 195 lb (88.5 kg)   Height: 5' 7\" (1.702 m)       Work History/Social History: Foot and ankle history:     Focused Lower Extremity Physical Exam:    Neurovascular examination:    Dorsalis Pedis palpable bilateral.  Posterior tibialis palpable bilateral.    Capillary Refill Time:  Immediate return  Hair growth:  Symmetrical and bilateral   Skin:  Not atrophic  Edema: No edema bilateral feet or ankles. Neurologic:  Light touch intact bilateral.      Musculoskeletal/ Orthopedic examination:    Equinis: Absent bilateral  Dorsiflexion, plantarflexion, inversion, eversion bilateral 5 out of 5 muscle strength  Wiggling toes  Negative Homans  No pain plantar fascial bilateral.  No pain insertion Achilles tendon. Negative calcaneal squeeze test.      Dermatology examination:    No edema foot or ankle. Assessment and Plan:  Gonzales Mariano was seen today for follow-up and foot pain. Diagnoses and all orders for this visit:    Pain in right foot    Plantar fasciitis    Encounter for current long-term use of anticoagulants        Radiographs from 5/18/2021 reviewed. Does have 4 mm plantar calcaneal spur and 6 mm retrocalcaneal spur noted. No fractures noted. Follow-up plantar fasciitis right  Continue use of wide well supported walking shoes. Avoid barefoot.   She is going to [place] for the next 3 months. I did stress importance of avoiding barefoot even inside. I did recommend a slipper or a supportive tennis shoe while she is weightbearing. Any recurrence of heel pain I would be happy to see her anytime. She does have an appointment with her primary care physician Dr. Bridget Skelton today. Appreciate input. I will follow-up as needed. Return if symptoms worsen or fail to improve. Seen By:  Kelsy Peacock DPM      Document was created using voice recognition software. Note was reviewed, however may contain grammatical errors.

## 2021-12-21 NOTE — PROGRESS NOTES
2021    Mahamed Hernandez (:  1935) is a 82 y.o. female, here for evaluation of the following medical concerns:    Patient has gained about 5 pounds since her last visit 2 months ago. Patient states that she has been eating quite a bit doubts this is edema causing the weight gain. Patient is denying orthopnea or PND. The patient states that she does not have dyspnea with exertion. She has a fair amount of arthritic symptomatology. She has received Covid vaccination and is due for booster vaccine. She will be traveling to [place] and I went in detail over what she could expect. I told her because of the warm humid conditions she may retain fluid. I have asked her to take her scale with her and weigh herself daily. If she gains more than 3 pounds she is to increase Bumex to twice per day until she does go back to her dry weight. Echocardiogram was reviewed last visit. She did have a brain natruretic peptide which was decreasing. This will be repeated today. She is in chronic atrial fibrillation and her rate seems to be controlled. She is chronically anticoagulated without any symptoms of bleeding. Other    Back Pain    Hyperlipidemia    Hypertension          Review of Systems   Constitutional: Negative. HENT: Negative. Eyes: Negative. Respiratory: Negative. Cardiovascular:        Atrial fibrillation without evidence of rapid ventricular response. Improved symptomatology with increased diuresis   Gastrointestinal:        Denies abdominal pain. Endocrine:        Glucose intolerance. Last hemoglobin A1c was 6.1   Genitourinary: Negative. Musculoskeletal: Positive for back pain. Thoracic and lumbar spine pain. Generalized osteoarthritis pain. Improved symptoms since being placed on duloxetine. Allergic/Immunologic: Negative. Neurological: Negative. Hematological: Negative. Psychiatric/Behavioral:        Improved depression with the use of Cymbalta.      Problem List: Malaise and fatigue, Essential hypertension, Hyperlipidemia  Health Maintenance:  Colonoscopy - (2014)  Bone Density Test Screening - (2009)  Couseled on Home Safety - (10/12/2015)  Influenza Vaccination - (2020)  Tdap - (2017) DISCUSSED  Zoster/Shingles Vaccine - (2017) DISCUSSED  Shingrix Vaccine (Shingles) - (2018) SLIP GIVEN  Medical Problems:  Hypertension, Hyperlipidemia  Atrial Fibrillation - (10/2014) POST SURGERY-[place]  Abdominal Aortic Aneurysm - CT   Thoracic Back Pain/Scoliosis  Thoracic Radiculopathy - REFERRED TO [facility]  Chronic RLL Infiltate - [facility]  Lumbar Spinal Stenosis - [facility] 2/10 improved with therapy- REFERRED TO [facility] 13-recurrent- failed PT-MRI ordered 3/16/2021  Incisional Hernia - (2012) CAUSING PAIN  Carotid Artery Stenosis - ()  Depression- improved with Cymbalta 2021  CHF p EF 2021  Surgical Hx:  AAA - LAST CT , ORDERED 11/10-normal CT 2019  Tonsillectomy  Appendectomy - 2011 GANGRENOUS  Lumbar Surgery - (2014) [facility]  OB/Gyn Hx:: (4)Parity: Full term (3), one miscarriage  Reviewed, no changes. FH:  Father:  MI -  age 67. Mother:  Cerebrovascular Accident (CVA) -  age 80. Reviewed, no changes. SH:  Marital: . Personal Habits: Alcohol: Occasionally consumes wine. Exercise Type: Does housework daily. Reviewed, no changes. Date: 2021  Was the patient queried about smoking behavior? Yes   Does the patient currently smoke? Smoking: Patient is a former smoker. Prior to Visit Medications    Medication Sig Taking?  Authorizing Provider   simvastatin (ZOCOR) 40 MG tablet Take 1 tablet by mouth nightly Yes Citlaly Osorio MD   ELIQUIS 5 MG TABS tablet take 1 tablet by mouth twice a day Yes Citlaly Osorio MD   lisinopril (PRINIVIL;ZESTRIL) 40 MG tablet One po daily Yes Citlaly Osorio MD   bumetanide (BUMEX) 2 MG tablet Take 1 tablet by mouth daily Yes Citlaly Osorio MD   DULoxetine (CYMBALTA) 30 MG extended release capsule Take 1 capsule by mouth daily Yes Cammie Hawkins MD   HYDROcodone-acetaminophen (NORCO) 5-325 MG per tablet Take 1 tablet by mouth every 6 hours as needed for Pain for up to 30 days. Yes Cammie Hawkins MD   ezetimibe (ZETIA) 10 MG tablet TAKE 1 TABLET BY MOUTH EVERY DAY Yes Cammie Hawkins MD   metoprolol tartrate (LOPRESSOR) 50 MG tablet Take 1.5 tablets by mouth 2 times daily Yes Cammie Hawkins MD   fluticasone (FLONASE) 50 MCG/ACT nasal spray 1 spray by Nasal route as needed for Rhinitis or Allergies  Cammie Hawkins MD   Cholecalciferol (VITAMIN D3) 2000 units CAPS Take by mouth daily  Historical Provider, MD   Multiple Vitamins-Minerals (THERAPEUTIC MULTIVITAMIN-MINERALS) tablet Take 1 tablet by mouth daily. Historical Provider, MD   Calcium Carb-Cholecalciferol (CALCIUM + D3) 600-200 MG-UNIT TABS Take 1 tablet by mouth daily.   Historical Provider, MD        Allergies   Allergen Reactions    Amoxicillin-Pot Clavulanate Diarrhea    Crestor [Rosuvastatin] Other (See Comments)     Muscle fatigue    Lipitor [Atorvastatin] Other (See Comments)     Muscle fatigue    Sulfa Antibiotics Hives       Past Medical History:   Diagnosis Date    AAA (abdominal aortic aneurysm) (HCC)     Anticoagulant long-term use     Atrial fibrillation ([facility] [address]     Carotid artery stenosis     Hepatitis A     Hyperlipidemia     Hypertension     Incisional hernia     Spinal stenosis     Thoracic back pain     Thoracic back pain/Scoliosis    Thoracic radiculopathy     [facility]       Past Surgical History:   Procedure Laterality Date    [facility]    APPENDECTOMY      CARDIOVERSION  10/28/2014    OTHER SURGICAL HISTORY      epidural injections in spine    SPINE SURGERY  10/2014    TONSILLECTOMY         Social History     Socioeconomic History    Marital status:      Spouse name: Not on file    Number of children: Not on file    Years of education: Not on file    Highest education level: Not on file   Occupational History    Not on file   Tobacco Use    Smoking status: Former Smoker     Packs/day: 1.00     Years: 15.00     Pack years: 15.00     Types: Cigarettes     Quit date: 1990     Years since quittin.1    Smokeless tobacco: Never Used   Substance and Sexual Activity    Alcohol use: Yes     Comment: rarely    Drug use: No    Sexual activity: Not on file   Other Topics Concern    Not on file   Social History Narrative    Not on file     Social Determinants of Health     Financial Resource Strain:     Difficulty of Paying Living Expenses: Not on file   Food Insecurity:     Worried About Running Out of Food in the Last Year: Not on file    [place] [employer] in the Last Year: Not on file   Transportation Needs:     Lack of Transportation (Medical): Not on file    Lack of Transportation (Non-Medical):  Not on file   Physical Activity:     Days of Exercise per Week: Not on file    Minutes of Exercise per Session: Not on file   Stress:     Feeling of Stress : Not on file   Social Connections:     Frequency of Communication with Friends and Family: Not on file    Frequency of Social Gatherings with Friends and Family: Not on file    Attends Yarsanism Services: Not on file    Active Member of [address] [place] [address] [employer] or Organizations: Not on file    Attends Club or Organization Meetings: Not on file    Marital Status: Not on file   Intimate Partner Violence:     Fear of Current or Ex-Partner: Not on file    Emotionally Abused: Not on file    Physically Abused: Not on file    Sexually Abused: Not on file   Housing Stability:     Unable to Pay for Housing in the Last Year: Not on file    Number of [place] in the Last Year: Not on file    Unstable Housing in the Last Year: Not on file        Family History   Problem Relation Age of Onset    Stroke Mother     Heart Disease Father     Heart Disease Brother        Vitals: 12/21/21 1157   BP: 130/80   Pulse: 87   Temp: 98 °F (36.7 °C)   SpO2: 99%   Weight: 200 lb (90.7 kg)     Estimated body mass index is 31.32 kg/m² as calculated from the following:    Height as of an earlier encounter on 12/21/21: 5' 7\" (1.702 m). Weight as of this encounter: 200 lb (90.7 kg). Physical Exam  Const: Appears healthy, well developed and well nourished. Appears obese. Eyes: EOMI in both eyes. PERRL. ENMT: External ears WNL. Tympanic membranes are intact. External nose WNL. Neck: Supple and symmetric. Palpation reveals no adenopathy. No masses appreciated. Thyroid: no nodule  appreciated or thyromegaly. No jugular venous distention. Resp: Respirations are unlabored. Respiration rate is normal. Auscultate good airflow. Minimal crackles at the bases. CV: Rhythm is irregularly irregular. S1 is normal. S2 is normal. Carotids: no bruits. Abdominal aorta: not palpable. Pedal  pulses: 2+ and equal bilaterally. Extremities: No clubbing, cyanosis . No edema today of the lower extremity. Abdomen: Bowel sounds are normoactive. Palpation reveals softness, with no distension, organomegaly or  tenderness. No abdominal masses palpable. No palpable hepatosplenomegaly. Musculo: Walks with a limping gait. Upper Extremities: ROM: Significant limitation in range of motion of the left  shoulder. Lower Extremities: ROM: Crepitus bilaterally more extensive on the left. Skin: Dry and warm with no rash. Neuro: Alert and oriented x3. Mood is normal. Affect is normal. Speech is articulate and fluent. Deep tendon reflexes are absent both patellar and plantar bilaterally. No evidence of erythema, warmth, or fluctuance  Diagnoses and all orders for this visit:    Chronic heart failure with preserved ejection fraction ([address] [address]  -     Comprehensive Metabolic Panel; Future  -     BRAIN NATRIURETIC PEPTIDE;  Future  -     CBC Auto Differential; Future    Arthritis  -     HYDROcodone-acetaminophen (NORCO) 5-325 MG per tablet; Take 1 tablet by mouth every 6 hours as needed for Pain for up to 30 days. -     Comprehensive Metabolic Panel; Future  -     BRAIN NATRIURETIC PEPTIDE; Future  -     CBC Auto Differential; Future    Essential hypertension  -     Comprehensive Metabolic Panel; Future  -     BRAIN NATRIURETIC PEPTIDE; Future  -     CBC Auto Differential; Future    Paroxysmal atrial fibrillation (HCC)  -     Comprehensive Metabolic Panel; Future  -     BRAIN NATRIURETIC PEPTIDE; Future  -     CBC Auto Differential; Future    Anticoagulant long-term use  -     Comprehensive Metabolic Panel; Future  -     BRAIN NATRIURETIC PEPTIDE; Future  -     CBC Auto Differential; Future    Mixed hyperlipidemia    Other orders  -     ezetimibe (ZETIA) 10 MG tablet; TAKE 1 TABLET BY MOUTH EVERY DAY    The patient is to have lab work today. I may need to make adjustments of her medications based on this. I wanted her to make sure about parameters that she needs to follow while she is in [place] especially as she may expect to gain some weight from the heat. Patient is to be seen back in April when she returns.

## 2022-03-15 RX ORDER — BUMETANIDE 2 MG/1
2 TABLET ORAL DAILY
Qty: 30 TABLET | Refills: 5 | Status: SHIPPED
Start: 2022-03-15 | End: 2022-04-12 | Stop reason: SDUPTHER

## 2022-03-15 NOTE — TELEPHONE ENCOUNTER
Last Appointment:  12/21/2021  Future Appointments   Date Time Provider Orlando Rodriguez   4/12/2022 11:30 AM Deion Rodriguez  [address]

## 2022-03-31 RX ORDER — APIXABAN 5 MG/1
TABLET, FILM COATED ORAL
Qty: 60 TABLET | Refills: 5 | Status: SHIPPED
Start: 2022-03-31 | End: 2022-04-12 | Stop reason: SDUPTHER

## 2022-03-31 NOTE — TELEPHONE ENCOUNTER
Last Appointment:  12/21/2021  Future Appointments   Date Time Provider Orlando Rodriguez   4/12/2022 11:30 AM Nyasia Willett  [address]

## 2022-04-01 RX ORDER — DULOXETIN HYDROCHLORIDE 30 MG/1
CAPSULE, DELAYED RELEASE ORAL
Qty: 30 CAPSULE | Refills: 5 | Status: SHIPPED
Start: 2022-04-01 | End: 2022-04-12 | Stop reason: SDUPTHER

## 2022-04-01 NOTE — TELEPHONE ENCOUNTER
Last Appointment:  12/21/2021  Future Appointments   Date Time Provider Orlando Rodriguez   4/12/2022 11:30 AM Katarina Lobo  [address] [facility] [address]

## 2022-04-05 DIAGNOSIS — I10 ESSENTIAL HYPERTENSION: ICD-10-CM

## 2022-04-05 RX ORDER — LISINOPRIL 40 MG/1
TABLET ORAL
Qty: 90 TABLET | Refills: 1 | Status: SHIPPED
Start: 2022-04-05 | End: 2022-09-21

## 2022-04-05 NOTE — TELEPHONE ENCOUNTER
Last Appointment:  12/21/2021  Future Appointments   Date Time Provider Orlando Rodriguez   4/12/2022 11:30 AM Nati Mart  [address] [facility] [place] [address]

## 2022-04-11 RX ORDER — APIXABAN 5 MG/1
TABLET, FILM COATED ORAL
Qty: 60 TABLET | Refills: 5 | OUTPATIENT
Start: 2022-04-11

## 2022-04-12 ENCOUNTER — OFFICE VISIT (OUTPATIENT)
Dept: FAMILY MEDICINE CLINIC | Age: 87
End: 2022-04-12
Payer: MEDICARE

## 2022-04-12 VITALS
BODY MASS INDEX: 30.85 KG/M2 | OXYGEN SATURATION: 96 % | SYSTOLIC BLOOD PRESSURE: 120 MMHG | HEART RATE: 113 BPM | WEIGHT: 197 LBS | DIASTOLIC BLOOD PRESSURE: 80 MMHG | TEMPERATURE: 97.9 F

## 2022-04-12 DIAGNOSIS — Z79.01 ANTICOAGULANT LONG-TERM USE: ICD-10-CM

## 2022-04-12 DIAGNOSIS — I48.0 PAROXYSMAL ATRIAL FIBRILLATION (HCC): ICD-10-CM

## 2022-04-12 DIAGNOSIS — E78.2 MIXED HYPERLIPIDEMIA: ICD-10-CM

## 2022-04-12 DIAGNOSIS — I50.32 CHRONIC HEART FAILURE WITH PRESERVED EJECTION FRACTION (HCC): ICD-10-CM

## 2022-04-12 DIAGNOSIS — E79.0 HYPERURICEMIA: ICD-10-CM

## 2022-04-12 DIAGNOSIS — I10 ESSENTIAL HYPERTENSION: ICD-10-CM

## 2022-04-12 DIAGNOSIS — I73.9 VASCULAR CLAUDICATION (HCC): Primary | ICD-10-CM

## 2022-04-12 LAB
ALBUMIN SERPL-MCNC: 4.6 G/DL (ref 3.5–5.2)
ALP BLD-CCNC: 49 U/L (ref 35–104)
ALT SERPL-CCNC: 8 U/L (ref 0–32)
ANION GAP SERPL CALCULATED.3IONS-SCNC: 17 MMOL/L (ref 7–16)
AST SERPL-CCNC: 19 U/L (ref 0–31)
BASOPHILS ABSOLUTE: 0.05 E9/L (ref 0–0.2)
BASOPHILS RELATIVE PERCENT: 0.5 % (ref 0–2)
BILIRUB SERPL-MCNC: 0.6 MG/DL (ref 0–1.2)
BUN BLDV-MCNC: 25 MG/DL (ref 6–23)
CALCIUM SERPL-MCNC: 10.2 MG/DL (ref 8.6–10.2)
CHLORIDE BLD-SCNC: 102 MMOL/L (ref 98–107)
CHOLESTEROL, TOTAL: 140 MG/DL (ref 0–199)
CO2: 23 MMOL/L (ref 22–29)
CREAT SERPL-MCNC: 1.2 MG/DL (ref 0.5–1)
EOSINOPHILS ABSOLUTE: 0.33 E9/L (ref 0.05–0.5)
EOSINOPHILS RELATIVE PERCENT: 3.2 % (ref 0–6)
GFR AFRICAN AMERICAN: 52
GFR NON-AFRICAN AMERICAN: 43 ML/MIN/1.73
GLUCOSE BLD-MCNC: 85 MG/DL (ref 74–99)
HCT VFR BLD CALC: 47.1 % (ref 34–48)
HDLC SERPL-MCNC: 36 MG/DL
HEMOGLOBIN: 14.1 G/DL (ref 11.5–15.5)
IMMATURE GRANULOCYTES #: 0.02 E9/L
IMMATURE GRANULOCYTES %: 0.2 % (ref 0–5)
LDL CHOLESTEROL CALCULATED: 71 MG/DL (ref 0–99)
LYMPHOCYTES ABSOLUTE: 2.24 E9/L (ref 1.5–4)
LYMPHOCYTES RELATIVE PERCENT: 21.6 % (ref 20–42)
MCH RBC QN AUTO: 27.9 PG (ref 26–35)
MCHC RBC AUTO-ENTMCNC: 29.9 % (ref 32–34.5)
MCV RBC AUTO: 93.1 FL (ref 80–99.9)
MONOCYTES ABSOLUTE: 1.04 E9/L (ref 0.1–0.95)
MONOCYTES RELATIVE PERCENT: 10 % (ref 2–12)
NEUTROPHILS ABSOLUTE: 6.7 E9/L (ref 1.8–7.3)
NEUTROPHILS RELATIVE PERCENT: 64.5 % (ref 43–80)
PDW BLD-RTO: 15.7 FL (ref 11.5–15)
PLATELET # BLD: 232 E9/L (ref 130–450)
PMV BLD AUTO: 10.8 FL (ref 7–12)
POTASSIUM SERPL-SCNC: 4.4 MMOL/L (ref 3.5–5)
PRO-BNP: 3269 PG/ML (ref 0–450)
RBC # BLD: 5.06 E12/L (ref 3.5–5.5)
SODIUM BLD-SCNC: 142 MMOL/L (ref 132–146)
TOTAL PROTEIN: 7.8 G/DL (ref 6.4–8.3)
TRIGL SERPL-MCNC: 167 MG/DL (ref 0–149)
URIC ACID, SERUM: 10 MG/DL (ref 2.4–5.7)
VLDLC SERPL CALC-MCNC: 33 MG/DL
WBC # BLD: 10.4 E9/L (ref 4.5–11.5)

## 2022-04-12 PROCEDURE — 1123F ACP DISCUSS/DSCN MKR DOCD: CPT | Performed by: INTERNAL MEDICINE

## 2022-04-12 PROCEDURE — G8427 DOCREV CUR MEDS BY ELIG CLIN: HCPCS | Performed by: INTERNAL MEDICINE

## 2022-04-12 PROCEDURE — 1036F TOBACCO NON-USER: CPT | Performed by: INTERNAL MEDICINE

## 2022-04-12 PROCEDURE — G8400 PT W/DXA NO RESULTS DOC: HCPCS | Performed by: INTERNAL MEDICINE

## 2022-04-12 PROCEDURE — G8417 CALC BMI ABV UP PARAM F/U: HCPCS | Performed by: INTERNAL MEDICINE

## 2022-04-12 PROCEDURE — 99214 OFFICE O/P EST MOD 30 MIN: CPT | Performed by: INTERNAL MEDICINE

## 2022-04-12 PROCEDURE — 4040F PNEUMOC VAC/ADMIN/RCVD: CPT | Performed by: INTERNAL MEDICINE

## 2022-04-12 PROCEDURE — 1090F PRES/ABSN URINE INCON ASSESS: CPT | Performed by: INTERNAL MEDICINE

## 2022-04-12 RX ORDER — METOPROLOL TARTRATE 50 MG/1
75 TABLET, FILM COATED ORAL 2 TIMES DAILY
Qty: 270 TABLET | Refills: 3 | Status: SHIPPED | OUTPATIENT
Start: 2022-04-12

## 2022-04-12 RX ORDER — SIMVASTATIN 40 MG
40 TABLET ORAL NIGHTLY
Qty: 90 TABLET | Refills: 1 | Status: SHIPPED
Start: 2022-04-12 | End: 2022-08-16 | Stop reason: SDUPTHER

## 2022-04-12 RX ORDER — BUMETANIDE 2 MG/1
2 TABLET ORAL DAILY
Qty: 90 TABLET | Refills: 1 | Status: SHIPPED
Start: 2022-04-12 | End: 2022-08-16 | Stop reason: SDUPTHER

## 2022-04-12 RX ORDER — DULOXETIN HYDROCHLORIDE 30 MG/1
CAPSULE, DELAYED RELEASE ORAL
Qty: 90 CAPSULE | Refills: 1 | Status: SHIPPED
Start: 2022-04-12 | End: 2022-08-16 | Stop reason: SDUPTHER

## 2022-04-12 SDOH — ECONOMIC STABILITY: FOOD INSECURITY: WITHIN THE PAST 12 MONTHS, YOU WORRIED THAT YOUR FOOD WOULD RUN OUT BEFORE YOU GOT MONEY TO BUY MORE.: NEVER TRUE

## 2022-04-12 SDOH — ECONOMIC STABILITY: FOOD INSECURITY: WITHIN THE PAST 12 MONTHS, THE FOOD YOU BOUGHT JUST DIDN'T LAST AND YOU DIDN'T HAVE MONEY TO GET MORE.: NEVER TRUE

## 2022-04-12 ASSESSMENT — PATIENT HEALTH QUESTIONNAIRE - PHQ9
4. FEELING TIRED OR HAVING LITTLE ENERGY: 3
SUM OF ALL RESPONSES TO PHQ QUESTIONS 1-9: 4
10. IF YOU CHECKED OFF ANY PROBLEMS, HOW DIFFICULT HAVE THESE PROBLEMS MADE IT FOR YOU TO DO YOUR WORK, TAKE CARE OF THINGS AT HOME, OR GET ALONG WITH OTHER PEOPLE: 0
3. TROUBLE FALLING OR STAYING ASLEEP: 0
SUM OF ALL RESPONSES TO PHQ QUESTIONS 1-9: 4
SUM OF ALL RESPONSES TO PHQ QUESTIONS 1-9: 4
SUM OF ALL RESPONSES TO PHQ9 QUESTIONS 1 & 2: 0
2. FEELING DOWN, DEPRESSED OR HOPELESS: 0
1. LITTLE INTEREST OR PLEASURE IN DOING THINGS: 0
7. TROUBLE CONCENTRATING ON THINGS, SUCH AS READING THE NEWSPAPER OR WATCHING TELEVISION: 0
6. FEELING BAD ABOUT YOURSELF - OR THAT YOU ARE A FAILURE OR HAVE LET YOURSELF OR YOUR FAMILY DOWN: 0
SUM OF ALL RESPONSES TO PHQ QUESTIONS 1-9: 0
2. FEELING DOWN, DEPRESSED OR HOPELESS: 0
SUM OF ALL RESPONSES TO PHQ QUESTIONS 1-9: 0
SUM OF ALL RESPONSES TO PHQ QUESTIONS 1-9: 0
8. MOVING OR SPEAKING SO SLOWLY THAT OTHER PEOPLE COULD HAVE NOTICED. OR THE OPPOSITE, BEING SO FIGETY OR RESTLESS THAT YOU HAVE BEEN MOVING AROUND A LOT MORE THAN USUAL: 0
9. THOUGHTS THAT YOU WOULD BE BETTER OFF DEAD, OR OF HURTING YOURSELF: 0
SUM OF ALL RESPONSES TO PHQ QUESTIONS 1-9: 0
SUM OF ALL RESPONSES TO PHQ QUESTIONS 1-9: 4
5. POOR APPETITE OR OVEREATING: 1

## 2022-04-12 ASSESSMENT — ENCOUNTER SYMPTOMS
BACK PAIN: 1
EYES NEGATIVE: 1
ALLERGIC/IMMUNOLOGIC NEGATIVE: 1
RESPIRATORY NEGATIVE: 1

## 2022-04-12 ASSESSMENT — SOCIAL DETERMINANTS OF HEALTH (SDOH): HOW HARD IS IT FOR YOU TO PAY FOR THE VERY BASICS LIKE FOOD, HOUSING, MEDICAL CARE, AND HEATING?: NOT HARD AT ALL

## 2022-04-12 NOTE — PROGRESS NOTES
2022    Laverne Hernandez (:  1935) is a 82 y.o. female, here for evaluation of the following medical concerns:    Patient done well while she was in [place]. She is having some claudication symptomatology. Almost sounds vascular in nature rather than neurogenic. She has had back surgery in the past.  She does have some peripheral neuropathy symptomatology. 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 She states the 1 became very swollen and painful. She denies fever, chills, sweats. There is been no night sweats. Her breathing has been good. She has been sleeping well. She denies any bleeding or bruising despite being on chronic anticoagulation. She has not noted any racing of her heart. Even though she is in atrial fibrillation she has no idea that she has an irregular rhythm. Other    Back Pain    Hyperlipidemia    Hypertension          Review of Systems   Constitutional: Negative. HENT: Negative. Eyes: Negative. Respiratory: Negative. Cardiovascular:        Atrial fibrillation without evidence of rapid ventricular response. Improved symptomatology with increased diuresis. Questionable vascular claudication. Gastrointestinal:        Denies abdominal pain. Endocrine:        Glucose intolerance. Last hemoglobin A1c was 6.1   Genitourinary: Negative. Musculoskeletal: Positive for back pain. Thoracic and lumbar spine pain. Generalized osteoarthritis pain. Improved symptoms since being placed on duloxetine. Allergic/Immunologic: Negative. Neurological: Negative. Hematological: Negative. Psychiatric/Behavioral:        Improved depression with the use of Cymbalta.      Problem List: Malaise and fatigue, Essential hypertension, Hyperlipidemia  Health Maintenance:  Colonoscopy - (2014)  Bone Density Test Screening - (2009)  Couseled on Home Safety - (10/12/2015)  Influenza Vaccination - (2020)  Tdap - (2017) DISCUSSED  Zoster/Shingles Vaccine - (2017) DISCUSSED  Shingrix Vaccine (Shingles) - (2018) SLIP GIVEN  Medical Problems:  Hypertension, Hyperlipidemia  Atrial Fibrillation - (10/2014) POST SURGERY-[facility]  Abdominal Aortic Aneurysm - CT   Thoracic Back Pain/Scoliosis  Thoracic Radiculopathy - REFERRED TO [facility]  Chronic RLL Infiltate - [facility]  Lumbar Spinal Stenosis - [facility] 2/10 improved with therapy- REFERRED TO [facility] 13-recurrent- failed PT-MRI ordered 3/16/2021  Incisional Hernia - (2012) CAUSING PAIN  Carotid Artery Stenosis - ()  Depression- improved with Cymbalta 2021  CHF p EF 2021  Surgical Hx:  AAA - LAST CT , ORDERED 11/10-normal CT   Tonsillectomy  Appendectomy - 2011 GANGRENOUS  Lumbar Surgery - (2014) ERIK  OB/Gyn Hx:: (4)Parity: Full term (3), one miscarriage  Reviewed, no changes. FH:  Father:  MI -  age 67. Mother:  Cerebrovascular Accident (CVA) -  age 80. Reviewed, no changes. SH:  Marital: . Personal Habits: Alcohol: Occasionally consumes wine. Exercise Type: Does housework daily. Reviewed, no changes. Date: 2022  Was the patient queried about smoking behavior? Yes   Does the patient currently smoke? Smoking: Patient is a former smoker. Prior to Visit Medications    Medication Sig Taking? Authorizing Provider   lisinopril (PRINIVIL;ZESTRIL) 40 MG tablet take 1 tablet by mouth once daily Yes Harpreet Horner MD   DULoxetine (CYMBALTA) 30 MG extended release capsule TAKE 1 CAPSULE BY MOUTH EVERY DAY Yes Harpreet Horner MD   ELIQUIS 5 MG TABS tablet TAKE 1 TABLET BY MOUTH TWICE A DAY Yes Harpreet Horner MD   bumetanide (BUMEX) 2 MG tablet Take 1 tablet by mouth daily Yes Harpreet Horner MD   HYDROcodone-acetaminophen (NORCO) 5-325 MG per tablet Take 1 tablet by mouth every 6 hours as needed for Pain for up to 30 days.  Yes Harpreet Horner MD ezetimibe (ZETIA) 10 MG tablet TAKE 1 TABLET BY MOUTH EVERY DAY Yes Sugey Sanches MD   simvastatin (ZOCOR) 40 MG tablet Take 1 tablet by mouth nightly Yes Sugey Sanches MD   metoprolol tartrate (LOPRESSOR) 50 MG tablet Take 1.5 tablets by mouth 2 times daily Yes Sugey Sanches MD   fluticasone (FLONASE) 50 MCG/ACT nasal spray 1 spray by Nasal route as needed for Rhinitis or Allergies  Sugey Sanches MD   Cholecalciferol (VITAMIN D3) 2000 units CAPS Take by mouth daily  Historical Provider, MD   Multiple Vitamins-Minerals (THERAPEUTIC MULTIVITAMIN-MINERALS) tablet Take 1 tablet by mouth daily. Historical Provider, MD   Calcium Carb-Cholecalciferol (CALCIUM + D3) 600-200 MG-UNIT TABS Take 1 tablet by mouth daily.   Historical Provider, MD        Allergies   Allergen Reactions    Amoxicillin-Pot Clavulanate Diarrhea    Crestor [Rosuvastatin] Other (See Comments)     Muscle fatigue    Lipitor [Atorvastatin] Other (See Comments)     Muscle fatigue    Sulfa Antibiotics Hives       Past Medical History:   Diagnosis Date    AAA (abdominal aortic aneurysm) (HCC)     Anticoagulant long-term use     Atrial fibrillation ([facility] [address]     Carotid artery stenosis     Hepatitis A     Hyperlipidemia     Hypertension     Incisional hernia     Spinal stenosis     Thoracic back pain     Thoracic back pain/Scoliosis    Thoracic radiculopathy     [facility]       Past Surgical History:   Procedure Laterality Date    ABDOMINAL [facility]ORTIC [facility]EURYSM [facility]PAIR  2005    [facility]    APPENDECTOMY      CARDIOVERSION  10/28/2014    OTHER SURGICAL HISTORY      epidural injections in spine    SPINE SURGERY  10/2014    TONSILLECTOMY         Social History     Socioeconomic History    Marital status:      Spouse name: Not on file    Number of children: Not on file    Years of education: Not on file    Highest education level: Not on file   Occupational History    Not on file   Tobacco Use    Smoking status: Former Smoker Packs/day: 1.00     Years: 15.00     Pack years: 15.00     Types: Cigarettes     Quit date: 1990     Years since quittin.4    Smokeless tobacco: Never Used   Substance and Sexual Activity    Alcohol use: Yes     Comment: rarely    Drug use: No    Sexual activity: Not on file   Other Topics Concern    Not on file   Social History Narrative    Not on file     Social Determinants of Health     Financial Resource Strain: Low Risk     Difficulty of Paying Living Expenses: Not hard at all   Food Insecurity: No Food Insecurity    Worried About Running Out of Food in the Last Year: Never true    [place] of Food in the Last Year: Never true   Transportation Needs:     Lack of Transportation (Medical): Not on file    Lack of Transportation (Non-Medical):  Not on file   Physical Activity:     Days of Exercise per Week: Not on file    Minutes of Exercise per Session: Not on file   Stress:     Feeling of Stress : Not on file   Social Connections:     Frequency of Communication with Friends and Family: Not on file    Frequency of Social Gatherings with Friends and Family: Not on file    Attends Yazdanism Services: Not on file    Active Member of [address] [place] [address] or Organizations: Not on file    Attends Club or Organization Meetings: Not on file    Marital Status: Not on file   Intimate Partner Violence:     Fear of Current or Ex-Partner: Not on file    Emotionally Abused: Not on file    Physically Abused: Not on file    Sexually Abused: Not on file   Housing Stability:     Unable to Pay for Housing in the Last Year: Not on file    Number of [place] in the Last Year: Not on file    Unstable Housing in the Last Year: Not on file        Family History   Problem Relation Age of Onset    Stroke Mother     Heart Disease Father     Heart Disease Brother        Vitals:    22 1121   BP: 120/80   Pulse: 113   Temp: 97.9 °F (36.6 °C)   SpO2: 96%   Weight: 197 lb (89.4 kg)     Estimated body mass index is 30.85 kg/m² as calculated from the following:    Height as of 12/21/21: 5' 7\" (1.702 m). Weight as of this encounter: 197 lb (89.4 kg). Physical Exam  Const: Appears healthy, well developed and well nourished. Appears obese. Eyes: EOMI in both eyes. PERRL. ENMT: External ears WNL. Tympanic membranes are intact. External nose WNL. Neck: Supple and symmetric. Palpation reveals no adenopathy. No masses appreciated. Thyroid: no nodule  appreciated or thyromegaly. No jugular venous distention. Resp: Respirations are unlabored. Respiration rate is normal. Auscultate good airflow. Minimal crackles at the bases. CV: Rhythm is irregularly irregular. S1 is normal. S2 is normal. Carotids: no bruits. Abdominal aorta: not palpable. Pedal  pulses: 2+ and equal bilaterally. Extremities: No clubbing, cyanosis . No edema today of the lower extremity. Abdomen: Bowel sounds are normoactive. Palpation reveals softness, with no distension, organomegaly or  tenderness. No abdominal masses palpable. No palpable hepatosplenomegaly. Musculo: Walks with a limping gait. Upper Extremities: ROM: Significant limitation in range of motion of the left  shoulder. Lower Extremities: ROM: Crepitus bilaterally more extensive on the left. Skin: Dry and warm with no rash. Neuro: Alert and oriented x3. Mood is normal. Affect is normal. Speech is articulate and fluent. Deep tendon reflexes are absent both patellar and plantar bilaterally. No evidence of erythema, warmth, or fluctuance  Runell Mannyck was seen today for other. Diagnoses and all orders for this visit:    Vascular claudication ([address]  -     VL SHWETA BILATERAL LIMITED 1-2 LEVELS; Future    Chronic heart failure with preserved ejection fraction (HCC)  -     Comprehensive Metabolic Panel; Future  -     Brain Natriuretic Peptide; Future    Paroxysmal atrial fibrillation (HCC)  -     Comprehensive Metabolic Panel;  Future    Essential hypertension    Anticoagulant long-term use  - CBC with Auto Differential; Future    Mixed hyperlipidemia  -     Lipid Panel; Future    Hyperuricemia  -     Uric Acid; Future    Other orders  -     DULoxetine (CYMBALTA) 30 MG extended release capsule; TAKE 1 CAPSULE BY MOUTH EVERY DAY  -     apixaban (ELIQUIS) 5 MG TABS tablet; TAKE 1 TABLET BY MOUTH TWICE A DAY  -     simvastatin (ZOCOR) 40 MG tablet; Take 1 tablet by mouth nightly  -     metoprolol tartrate (LOPRESSOR) 50 MG tablet; Take 1.5 tablets by mouth 2 times daily  -     bumetanide (BUMEX) 2 MG tablet; Take 1 tablet by mouth daily    Patient is to be seen back in 4 months. She will have ankle-brachial screening. If this is negative I would encourage physical therapy for neurogenic symptoms. Lab work is ordered today. She does seem to be euvolemic currently.

## 2022-04-13 DIAGNOSIS — E79.0 HYPERURICEMIA: Primary | ICD-10-CM

## 2022-04-13 RX ORDER — COLCHICINE 0.6 MG/1
0.6 TABLET ORAL DAILY
Qty: 30 TABLET | Refills: 3 | Status: SHIPPED
Start: 2022-04-13 | End: 2022-08-02

## 2022-04-13 RX ORDER — ALLOPURINOL 100 MG/1
100 TABLET ORAL DAILY
Qty: 30 TABLET | Refills: 5 | Status: SHIPPED
Start: 2022-04-13 | End: 2022-08-16 | Stop reason: SDUPTHER

## 2022-04-27 DIAGNOSIS — M19.90 ARTHRITIS: ICD-10-CM

## 2022-04-27 RX ORDER — HYDROCODONE BITARTRATE AND ACETAMINOPHEN 5; 325 MG/1; MG/1
TABLET ORAL
Qty: 120 TABLET | Refills: 0 | Status: SHIPPED
Start: 2022-04-27 | End: 2022-09-16 | Stop reason: SDUPTHER

## 2022-04-27 NOTE — TELEPHONE ENCOUNTER
Last Appointment:  4/12/2022  Future Appointments   Date Time Provider Orlando Rodriguez   8/16/2022 11:00 AM Jennie He  [address] [place] [address]

## 2022-04-29 ENCOUNTER — TELEPHONE (OUTPATIENT)
Dept: FAMILY MEDICINE CLINIC | Age: 87
End: 2022-04-29

## 2022-04-29 NOTE — TELEPHONE ENCOUNTER
Patient's Hydrocodone has been approved through cover my meds. Patient notified. Approved today  Case [id number];[id number]; Review Type:Prior Auth; Coverage Start Date:03/30/2022; Coverage End Date:04/29/2023;

## 2022-07-05 RX ORDER — EZETIMIBE 10 MG/1
TABLET ORAL
Qty: 90 TABLET | Refills: 1 | Status: SHIPPED
Start: 2022-07-05 | End: 2022-08-16 | Stop reason: SDUPTHER

## 2022-07-05 NOTE — TELEPHONE ENCOUNTER
Last Appointment:  4/12/2022  Future Appointments   Date Time Provider Orlando Rodriguez   8/16/2022 11:00 AM Simone Loving  [address] [place] [address]

## 2022-08-02 RX ORDER — COLCHICINE 0.6 MG/1
TABLET ORAL
Qty: 120 TABLET | Refills: 2 | Status: SHIPPED | OUTPATIENT
Start: 2022-08-02

## 2022-08-02 NOTE — TELEPHONE ENCOUNTER
Last Appointment:  4/12/2022  Future Appointments   Date Time Provider Orlando Rodriguez   8/16/2022 11:00 AM Arya Dockery MD [facility]   8/16/2022 11:15 AM Arya Dockery  [address] [place] [address]

## 2022-08-16 ENCOUNTER — OFFICE VISIT (OUTPATIENT)
Dept: FAMILY MEDICINE CLINIC | Age: 87
End: 2022-08-16
Payer: MEDICARE

## 2022-08-16 VITALS
DIASTOLIC BLOOD PRESSURE: 80 MMHG | SYSTOLIC BLOOD PRESSURE: 130 MMHG | WEIGHT: 203 LBS | BODY MASS INDEX: 31.79 KG/M2 | TEMPERATURE: 97 F | HEART RATE: 104 BPM | OXYGEN SATURATION: 99 %

## 2022-08-16 VITALS
SYSTOLIC BLOOD PRESSURE: 130 MMHG | DIASTOLIC BLOOD PRESSURE: 80 MMHG | TEMPERATURE: 97.4 F | BODY MASS INDEX: 31.79 KG/M2 | WEIGHT: 203 LBS | HEART RATE: 104 BPM | OXYGEN SATURATION: 99 %

## 2022-08-16 DIAGNOSIS — Z00.00 ENCOUNTER FOR SUBSEQUENT ANNUAL WELLNESS VISIT IN MEDICARE PATIENT: ICD-10-CM

## 2022-08-16 DIAGNOSIS — E78.2 MIXED HYPERLIPIDEMIA: ICD-10-CM

## 2022-08-16 DIAGNOSIS — M19.90 ARTHRITIS: ICD-10-CM

## 2022-08-16 DIAGNOSIS — I10 ESSENTIAL HYPERTENSION: ICD-10-CM

## 2022-08-16 DIAGNOSIS — I50.32 CHRONIC HEART FAILURE WITH PRESERVED EJECTION FRACTION (HCC): ICD-10-CM

## 2022-08-16 DIAGNOSIS — I50.42 CHRONIC COMBINED SYSTOLIC AND DIASTOLIC HEART FAILURE (HCC): ICD-10-CM

## 2022-08-16 DIAGNOSIS — Z00.00 INITIAL MEDICARE ANNUAL WELLNESS VISIT: Primary | ICD-10-CM

## 2022-08-16 DIAGNOSIS — Z79.01 ANTICOAGULANT LONG-TERM USE: ICD-10-CM

## 2022-08-16 DIAGNOSIS — I48.0 PAROXYSMAL ATRIAL FIBRILLATION (HCC): Primary | ICD-10-CM

## 2022-08-16 DIAGNOSIS — N18.31 STAGE 3A CHRONIC KIDNEY DISEASE (HCC): ICD-10-CM

## 2022-08-16 DIAGNOSIS — R73.03 PREDIABETES: ICD-10-CM

## 2022-08-16 PROBLEM — N18.30 CHRONIC RENAL DISEASE, STAGE III (HCC): Status: ACTIVE | Noted: 2022-08-16

## 2022-08-16 LAB
ALBUMIN SERPL-MCNC: 4.5 G/DL (ref 3.5–5.2)
ALP BLD-CCNC: 49 U/L (ref 35–104)
ALT SERPL-CCNC: 13 U/L (ref 0–32)
ANION GAP SERPL CALCULATED.3IONS-SCNC: 20 MMOL/L (ref 7–16)
AST SERPL-CCNC: 20 U/L (ref 0–31)
BASOPHILS ABSOLUTE: 0.05 E9/L (ref 0–0.2)
BASOPHILS RELATIVE PERCENT: 0.6 % (ref 0–2)
BILIRUB SERPL-MCNC: 0.6 MG/DL (ref 0–1.2)
BUN BLDV-MCNC: 23 MG/DL (ref 6–23)
CALCIUM SERPL-MCNC: 10.4 MG/DL (ref 8.6–10.2)
CHLORIDE BLD-SCNC: 103 MMOL/L (ref 98–107)
CO2: 21 MMOL/L (ref 22–29)
CREAT SERPL-MCNC: 1.3 MG/DL (ref 0.5–1)
EOSINOPHILS ABSOLUTE: 0.33 E9/L (ref 0.05–0.5)
EOSINOPHILS RELATIVE PERCENT: 4 % (ref 0–6)
GFR AFRICAN AMERICAN: 47
GFR NON-AFRICAN AMERICAN: 39 ML/MIN/1.73
GLUCOSE BLD-MCNC: 109 MG/DL (ref 74–99)
HBA1C MFR BLD: 6.5 % (ref 4–5.6)
HCT VFR BLD CALC: 46.5 % (ref 34–48)
HEMOGLOBIN: 14.4 G/DL (ref 11.5–15.5)
IMMATURE GRANULOCYTES #: 0.02 E9/L
IMMATURE GRANULOCYTES %: 0.2 % (ref 0–5)
LYMPHOCYTES ABSOLUTE: 2.19 E9/L (ref 1.5–4)
LYMPHOCYTES RELATIVE PERCENT: 26.5 % (ref 20–42)
MCH RBC QN AUTO: 29.3 PG (ref 26–35)
MCHC RBC AUTO-ENTMCNC: 31 % (ref 32–34.5)
MCV RBC AUTO: 94.5 FL (ref 80–99.9)
MONOCYTES ABSOLUTE: 0.64 E9/L (ref 0.1–0.95)
MONOCYTES RELATIVE PERCENT: 7.7 % (ref 2–12)
NEUTROPHILS ABSOLUTE: 5.04 E9/L (ref 1.8–7.3)
NEUTROPHILS RELATIVE PERCENT: 61 % (ref 43–80)
PDW BLD-RTO: 15.6 FL (ref 11.5–15)
PLATELET # BLD: 207 E9/L (ref 130–450)
PMV BLD AUTO: 11.1 FL (ref 7–12)
POTASSIUM SERPL-SCNC: 4.6 MMOL/L (ref 3.5–5)
RBC # BLD: 4.92 E12/L (ref 3.5–5.5)
SODIUM BLD-SCNC: 144 MMOL/L (ref 132–146)
TOTAL PROTEIN: 8.3 G/DL (ref 6.4–8.3)
URIC ACID, SERUM: 7.6 MG/DL (ref 2.4–5.7)
WBC # BLD: 8.3 E9/L (ref 4.5–11.5)

## 2022-08-16 PROCEDURE — 1123F ACP DISCUSS/DSCN MKR DOCD: CPT | Performed by: INTERNAL MEDICINE

## 2022-08-16 PROCEDURE — 1036F TOBACCO NON-USER: CPT | Performed by: INTERNAL MEDICINE

## 2022-08-16 PROCEDURE — G8427 DOCREV CUR MEDS BY ELIG CLIN: HCPCS | Performed by: INTERNAL MEDICINE

## 2022-08-16 PROCEDURE — G8400 PT W/DXA NO RESULTS DOC: HCPCS | Performed by: INTERNAL MEDICINE

## 2022-08-16 PROCEDURE — 99214 OFFICE O/P EST MOD 30 MIN: CPT | Performed by: INTERNAL MEDICINE

## 2022-08-16 PROCEDURE — G0438 PPPS, INITIAL VISIT: HCPCS | Performed by: INTERNAL MEDICINE

## 2022-08-16 PROCEDURE — G8417 CALC BMI ABV UP PARAM F/U: HCPCS | Performed by: INTERNAL MEDICINE

## 2022-08-16 PROCEDURE — 1090F PRES/ABSN URINE INCON ASSESS: CPT | Performed by: INTERNAL MEDICINE

## 2022-08-16 RX ORDER — DULOXETIN HYDROCHLORIDE 30 MG/1
CAPSULE, DELAYED RELEASE ORAL
Qty: 90 CAPSULE | Refills: 1 | Status: SHIPPED
Start: 2022-08-16 | End: 2022-09-16 | Stop reason: SDUPTHER

## 2022-08-16 RX ORDER — ALLOPURINOL 100 MG/1
100 TABLET ORAL DAILY
Qty: 30 TABLET | Refills: 5 | Status: SHIPPED
Start: 2022-08-16 | End: 2022-08-18 | Stop reason: SDUPTHER

## 2022-08-16 RX ORDER — SIMVASTATIN 40 MG
40 TABLET ORAL NIGHTLY
Qty: 90 TABLET | Refills: 1 | Status: SHIPPED
Start: 2022-08-16 | End: 2022-09-16 | Stop reason: SDUPTHER

## 2022-08-16 RX ORDER — BUMETANIDE 2 MG/1
2 TABLET ORAL DAILY
Qty: 90 TABLET | Refills: 1 | Status: SHIPPED | OUTPATIENT
Start: 2022-08-16

## 2022-08-16 RX ORDER — EZETIMIBE 10 MG/1
TABLET ORAL
Qty: 90 TABLET | Refills: 1 | Status: SHIPPED
Start: 2022-08-16 | End: 2022-09-16 | Stop reason: SDUPTHER

## 2022-08-16 RX ORDER — CELECOXIB 200 MG/1
200 CAPSULE ORAL DAILY
Qty: 30 CAPSULE | Refills: 0 | Status: SHIPPED
Start: 2022-08-16 | End: 2022-09-21 | Stop reason: SDUPTHER

## 2022-08-16 ASSESSMENT — PATIENT HEALTH QUESTIONNAIRE - PHQ9
1. LITTLE INTEREST OR PLEASURE IN DOING THINGS: 0
3. TROUBLE FALLING OR STAYING ASLEEP: 0
SUM OF ALL RESPONSES TO PHQ QUESTIONS 1-9: 5
SUM OF ALL RESPONSES TO PHQ9 QUESTIONS 1 & 2: 1
4. FEELING TIRED OR HAVING LITTLE ENERGY: 3
8. MOVING OR SPEAKING SO SLOWLY THAT OTHER PEOPLE COULD HAVE NOTICED. OR THE OPPOSITE, BEING SO FIGETY OR RESTLESS THAT YOU HAVE BEEN MOVING AROUND A LOT MORE THAN USUAL: 0
SUM OF ALL RESPONSES TO PHQ QUESTIONS 1-9: 5
SUM OF ALL RESPONSES TO PHQ QUESTIONS 1-9: 5
6. FEELING BAD ABOUT YOURSELF - OR THAT YOU ARE A FAILURE OR HAVE LET YOURSELF OR YOUR FAMILY DOWN: 1
10. IF YOU CHECKED OFF ANY PROBLEMS, HOW DIFFICULT HAVE THESE PROBLEMS MADE IT FOR YOU TO DO YOUR WORK, TAKE CARE OF THINGS AT HOME, OR GET ALONG WITH OTHER PEOPLE: 0
9. THOUGHTS THAT YOU WOULD BE BETTER OFF DEAD, OR OF HURTING YOURSELF: 0
5. POOR APPETITE OR OVEREATING: 0
2. FEELING DOWN, DEPRESSED OR HOPELESS: 1
SUM OF ALL RESPONSES TO PHQ QUESTIONS 1-9: 5
7. TROUBLE CONCENTRATING ON THINGS, SUCH AS READING THE NEWSPAPER OR WATCHING TELEVISION: 0

## 2022-08-16 ASSESSMENT — LIFESTYLE VARIABLES
HOW OFTEN DO YOU HAVE A DRINK CONTAINING ALCOHOL: NEVER
HOW MANY STANDARD DRINKS CONTAINING ALCOHOL DO YOU HAVE ON A TYPICAL DAY: PATIENT DOES NOT DRINK

## 2022-08-16 ASSESSMENT — ENCOUNTER SYMPTOMS
ALLERGIC/IMMUNOLOGIC NEGATIVE: 1
RESPIRATORY NEGATIVE: 1
BACK PAIN: 1
EYES NEGATIVE: 1

## 2022-08-16 NOTE — PROGRESS NOTES
Medicare Annual Wellness Visit    Balbir Melton is here for Medicare AW    Assessment & Plan    Recommendations for Preventive Services Due: see orders and patient instructions/AVS.  Recommended screening schedule for the next 5-10 years is provided to the patient in written form: see Patient Instructions/AVS.     No follow-ups on file. Subjective       Patient's complete Health Risk Assessment and screening values have been reviewed and are found in Flowsheets. The following problems were reviewed today and where indicated follow up appointments were made and/or referrals ordered.     Positive Risk Factor Screenings with Interventions:      Depression:  PHQ-2 Score: 1  PHQ-9 Total Score: 5    Severity:1-4 = minimal depression, 5-9 = mild depression, 10-14 = moderate depression, 15-19 = moderately severe depression, 20-27 = severe depression  Depression Interventions:  Patient declines any further evaluation/treatment for this issue          General Health and ACP:  General  In general, how would you say your health is?: Fair  In the past 7 days, have you experienced any of the following: New or Increased Pain, New or Increased Fatigue, Loneliness, Social Isolation, Stress or Anger?: No  Do you get the social and emotional support that you need?: Yes  Do you have a Living Will?: Yes    Advance Directives       Power of  Living Will ACP-Advance Directive ACP-Power of     Not on File Not on File Not on File Not on File        General Health Risk Interventions:  Pain issues: patient advised to follow-up in this office for further evaluation and treatment within 4 week(s)    Health Habits/Nutrition:  Physical Activity: Insufficiently Active    Days of Exercise per Week: 7 days    Minutes of Exercise per Session: 20 min     Have you lost any weight without trying in the past 3 months?: No     Have you seen the dentist within the past year?: Yes  Health Habits/Nutrition Interventions:  Inadequate physical activity:  patient is not ready to increase his/her physical activity level at this time    Hearing/Vision:  Do you or your family notice any trouble with your hearing that hasn't been managed with hearing aids?: (!) Yes  Do you have difficulty driving, watching TV, or doing any of your daily activities because of your eyesight?: No  Have you had an eye exam within the past year?: Yes  No results found. Hearing/Vision Interventions:  Vision concerns:  patient encouraged to make appointment with his/her eye specialist            Objective   Vitals:    08/16/22 1120   BP: 130/80   Pulse: (!) 104   Temp: 97 °F (36.1 °C)   SpO2: 99%   Weight: 203 lb (92.1 kg)      Body mass index is 31.79 kg/m². Allergies   Allergen Reactions    Amoxicillin-Pot Clavulanate Diarrhea    Crestor [Rosuvastatin] Other (See Comments)     Muscle fatigue    Lipitor [Atorvastatin] Other (See Comments)     Muscle fatigue    Sulfa Antibiotics Hives     Prior to Visit Medications    Medication Sig Taking?  Authorizing Provider   colchicine (COLCRYS) 0.6 MG tablet take 1 tablet by mouth once daily  Isiah Mtz MD   ezetimibe (ZETIA) 10 MG tablet take 1 tablet by mouth once daily  Isiah Mtz MD   HYDROcodone-acetaminophen (NORCO) 5-325 MG per tablet take 1 tablet by mouth every 6 hours if needed for pain  Isiah Mtz MD   allopurinol (ZYLOPRIM) 100 MG tablet Take 1 tablet by mouth daily  Isiah Mtz MD   DULoxetine (CYMBALTA) 30 MG extended release capsule TAKE 1 CAPSULE BY MOUTH EVERY DAY  Isiah Mtz MD   apixaban (ELIQUIS) 5 MG TABS tablet TAKE 1 TABLET BY MOUTH TWICE A DAY  Isiah Mtz MD   simvastatin (ZOCOR) 40 MG tablet Take 1 tablet by mouth nightly  Isiah Mtz MD   metoprolol tartrate (LOPRESSOR) 50 MG tablet Take 1.5 tablets by mouth 2 times daily  Isiah Mtz MD   bumetanide (BUMEX) 2 MG tablet Take 1 tablet by mouth daily  Isiah Mtz MD   lisinopril (PRINIVIL;ZESTRIL) 40 MG tablet take 1 tablet by mouth once daily  Yohannes Cooper MD   fluticasone (FLONASE) 50 MCG/ACT nasal spray 1 spray by Nasal route as needed for Rhinitis or Allergies  Yohannes Cooper MD   Cholecalciferol (VITAMIN D3) 2000 units CAPS Take by mouth daily  Historical Provider, MD   Multiple Vitamins-Minerals (THERAPEUTIC MULTIVITAMIN-MINERALS) tablet Take 1 tablet by mouth daily. Historical Provider, MD   Calcium Carb-Cholecalciferol (CALCIUM + D3) 600-200 MG-UNIT TABS Take 1 tablet by mouth daily. Historical Provider, MD Jolly (Including outside providers/suppliers regularly involved in providing care):   Patient Care Team:  Yohannes Cooper MD as PCP - Adelfo Brar MD as PCP - [facility] Empaneled Provider  Erin Camp MD as Consulting Physician (Cardiology)     Reviewed and updated this visit:  Ezekiel Osorio was seen today for medicare awv.     Diagnoses and all orders for this visit:    Encounter for subsequent annual wellness visit in Medicare patient

## 2022-08-16 NOTE — PROGRESS NOTES
2022    Rebel Hernandez (:  1935) is a 82 y.o. female, here for evaluation of the following medical concerns:    Multiple issues today. A lot of problem revolving around left hip pain. Patient does have significant osteoarthritis. Patient recently had a dental extraction and was placed on prednisone perioperatively. Patient states that she had tremendous relief of her left hip discomfort at that time. Hip is very limiting and what she is able to do and is also extremely painful. Vicodin is able to take the edge off. She is unable to take nonsteroidals because of her Eliquis use but we did discuss possibly using Celebrex to see if she could tolerate this without causing bleeding and give her better relief of her arthritic symptomatology. Other    Back Pain    Hyperlipidemia    Hypertension        Review of Systems   Constitutional: Negative. HENT: Negative. Eyes: Negative. Respiratory: Negative. Cardiovascular:         Atrial fibrillation without evidence of rapid ventricular response. Improved symptomatology with increased diuresis. Questionable vascular claudication. Gastrointestinal:         Denies abdominal pain. Endocrine:        Glucose intolerance. Last hemoglobin A1c was 6.1   Genitourinary: Negative. Musculoskeletal:  Positive for back pain. Thoracic and lumbar spine pain. Generalized osteoarthritis pain. Improved symptoms since being placed on duloxetine. Allergic/Immunologic: Negative. Neurological: Negative. Hematological: Negative. Psychiatric/Behavioral:          Improved depression with the use of Cymbalta.    Problem List: Malaise and fatigue, Essential hypertension, Hyperlipidemia  Health Maintenance:  Colonoscopy - (2014)  Bone Density Test Screening - (2009)  Couseled on Home Safety - (10/12/2015)  Influenza Vaccination - (2020)  Tdap - (2017) DISCUSSED  Zoster/Shingles Vaccine - (2017) DISCUSSED  Shingrix Vaccine (Shingles) - (2018) SLIP GIVEN  Medical Problems:  Hypertension, Hyperlipidemia  Atrial Fibrillation - (10/2014) POST SURGERY-[facility]  Abdominal Aortic Aneurysm - CT   Thoracic Back Pain/Scoliosis  Thoracic Radiculopathy - REFERRED TO [facility]  Chronic RLL Infiltate - [facility]  Lumbar Spinal Stenosis - [facility] 2/10 improved with therapy- REFERRED TO [facility] 13-recurrent- failed PT-MRI ordered 3/16/2021  Incisional Hernia - (2012) CAUSING PAIN  Carotid Artery Stenosis - ()  Depression- improved with Cymbalta 2021  CHF p EF 2021  Surgical Hx:  AAA - LAST CT , ORDERED 11/10-normal CT 2019  Tonsillectomy  Appendectomy - 2011 GANGRENOUS  Lumbar Surgery - (2014) ERIK  OB/Gyn Hx:: (4)Parity: Full term (3), one miscarriage  Reviewed, no changes. FH:  Father:  MI -  age 67. Mother:  Cerebrovascular Accident (CVA) -  age 80. Reviewed, no changes. SH:  Marital: . Personal Habits: Alcohol: Occasionally consumes wine. Exercise Type: Does housework daily. Reviewed, no changes. Date: 2022  Was the patient queried about smoking behavior? Yes   Does the patient currently smoke? Smoking: Patient is a former smoker. Prior to Visit Medications    Medication Sig Taking?  Authorizing Provider   colchicine (COLCRYS) 0.6 MG tablet take 1 tablet by mouth once daily Yes José Miguel Rich MD   ezetimibe (ZETIA) 10 MG tablet take 1 tablet by mouth once daily Yes José Miguel Rich MD   HYDROcodone-acetaminophen (NORCO) 5-325 MG per tablet take 1 tablet by mouth every 6 hours if needed for pain Yes José Miguel Rich MD   allopurinol (ZYLOPRIM) 100 MG tablet Take 1 tablet by mouth daily Yes José Miguel Rich MD   DULoxetine (CYMBALTA) 30 MG extended release capsule TAKE 1 CAPSULE BY MOUTH EVERY DAY Yes José Miguel Rich MD   apixaban (ELIQUIS) 5 MG TABS tablet TAKE 1 TABLET BY MOUTH TWICE A DAY Yes José Miguel Rich MD   simvastatin (ZOCOR) 40 MG tablet Take 1 tablet by mouth nightly Yes Lauren Reynolds MD   bumetanide (BUMEX) 2 MG tablet Take 1 tablet by mouth daily Yes Lauren Reynolds MD   lisinopril (PRINIVIL;ZESTRIL) 40 MG tablet take 1 tablet by mouth once daily Yes Lauren Reynolds MD   metoprolol tartrate (LOPRESSOR) 50 MG tablet Take 1.5 tablets by mouth 2 times daily  Lauren Reynolds MD   fluticasone (FLONASE) 50 MCG/ACT nasal spray 1 spray by Nasal route as needed for Rhinitis or Allergies  Lauren Reynolds MD   Cholecalciferol (VITAMIN D3) 2000 units CAPS Take by mouth daily  Historical Provider, MD   Multiple Vitamins-Minerals (THERAPEUTIC MULTIVITAMIN-MINERALS) tablet Take 1 tablet by mouth daily. Historical Provider, MD   Calcium Carb-Cholecalciferol (CALCIUM + D3) 600-200 MG-UNIT TABS Take 1 tablet by mouth daily.   Historical Provider, MD        Allergies   Allergen Reactions    Amoxicillin-Pot Clavulanate Diarrhea    Crestor [Rosuvastatin] Other (See Comments)     Muscle fatigue    Lipitor [Atorvastatin] Other (See Comments)     Muscle fatigue    Sulfa Antibiotics Hives       Past Medical History:   Diagnosis Date    AAA (abdominal aortic aneurysm) ([facility] [address]     Anticoagulant long-term use     Atrial fibrillation ([address]     Carotid artery stenosis     Hepatitis A     Hyperlipidemia     Hypertension     Incisional hernia     Spinal stenosis     Thoracic back pain     Thoracic back pain/Scoliosis    Thoracic radiculopathy     [facility]       Past Surgical History:   Procedure Laterality Date    ABDOMINAL AORTIC ANEURYSM REPAIR  2005    [facility]    APPENDECTOMY      CAPSULOTOMY, HAND  10/28/2014    OTHER SURGICAL HISTORY      epidural injections in spine    SPINE SURGERY  10/2014    TONSILLECTOMY         Social History     Socioeconomic History    Marital status:      Spouse name: Not on file    Number of children: Not on file    Years of education: Not on file    Highest education level: Not on file   Occupational History    Not on file   Tobacco Use Smoking status: Former     Packs/day: 1.00     Years: 15.00     Pack years: 15.00     Types: Cigarettes     Quit date: 1990     Years since quittin.8    Smokeless tobacco: Never   Substance and Sexual Activity    Alcohol use: Yes     Comment: rarely    Drug use: No    Sexual activity: Not on file   Other Topics Concern    Not on file   Social History Narrative    Not on file     Social Determinants of Health     Financial Resource Strain: Low Risk     Difficulty of Paying Living Expenses: Not hard at all   Food Insecurity: No Food Insecurity    Worried About Running Out of Food in the Last Year: Never true    [address] in the Last Year: Never true   Transportation Needs: Not on file   Physical Activity: Insufficiently Active    Days of Exercise per Week: 7 days    Minutes of Exercise per Session: 20 min   Stress: Not on file   Social Connections: Not on file   Intimate Partner Violence: Not on file   Housing Stability: Not on file        Family History   Problem Relation Age of Onset    Stroke Mother     Heart Disease Father     Heart Disease Brother        Vitals:    22 1107   BP: 130/80   Pulse: (!) 104   Temp: 97.4 °F (36.3 °C)   SpO2: 99%   Weight: 203 lb (92.1 kg)     Estimated body mass index is 31.79 kg/m² as calculated from the following:    Height as of 21: 5' 7\" (1.702 m). Weight as of this encounter: 203 lb (92.1 kg). Physical Exam  Const: Appears healthy, well developed and well nourished. Appears obese. Eyes: EOMI in both eyes. PERRL. ENMT: External ears WNL. Tympanic membranes are intact. External nose WNL. Neck: Supple and symmetric. Palpation reveals no adenopathy. No masses appreciated. Thyroid: no nodule  appreciated or thyromegaly. No jugular venous distention. Resp: Respirations are unlabored. Respiration rate is normal. Auscultate good airflow. Minimal crackles at the bases. CV: Rhythm is irregularly irregular.  S1 is normal. S2 is normal. Carotids: no bruits. Abdominal aorta: not palpable. Pedal  pulses: 2+ and equal bilaterally. Extremities: No clubbing, cyanosis . No edema today of the lower extremity. Abdomen: Bowel sounds are normoactive. Palpation reveals softness, with no distension, organomegaly or  tenderness. No abdominal masses palpable. No palpable hepatosplenomegaly. Musculo: Walks with a limping gait. Upper Extremities: ROM: Significant limitation in range of motion of the left  shoulder. Lower Extremities: ROM: Significant limitation of range of motion of the left hip. Pain with inversion and eversion. Skin: Dry and warm with no rash. Neuro: Alert and oriented x3. Mood is normal. Affect is normal. Speech is articulate and fluent. Deep tendon reflexes are absent both patellar and plantar bilaterally. No evidence of erythema, warmth, or fluctuance  Diagnoses and all orders for this visit:    Paroxysmal atrial fibrillation [facility])  -     Herberth Barry MD, Cardiology, [place]    Essential hypertension  -     bumetanide (BUMEX) 2 MG tablet; Take 1 tablet by mouth in the morning.  -     simvastatin (ZOCOR) 40 MG tablet; Take 1 tablet by mouth nightly  -     allopurinol (ZYLOPRIM) 100 MG tablet; Take 1 tablet by mouth in the morning.  -     ezetimibe (ZETIA) 10 MG tablet; take 1 tablet by mouth once daily  -     DULoxetine (CYMBALTA) 30 MG extended release capsule; TAKE 1 CAPSULE BY MOUTH EVERY DAY  -     celecoxib (CELEBREX) 200 MG capsule; Take 1 capsule by mouth in the morning.  -     Comprehensive Metabolic Panel; Future  -     Sedimentation Rate; Future  -     CBC with Auto Differential; Future  -     Herberth Barry MD, Cardiology, [place]    Anticoagulant long-term use  -     bumetanide (BUMEX) 2 MG tablet; Take 1 tablet by mouth in the morning.  -     simvastatin (ZOCOR) 40 MG tablet; Take 1 tablet by mouth nightly  -     allopurinol (ZYLOPRIM) 100 MG tablet;  Take 1 tablet by mouth in the morning.  -     ezetimibe (ZETIA) 10 morning.  -     simvastatin (ZOCOR) 40 MG tablet; Take 1 tablet by mouth nightly  -     allopurinol (ZYLOPRIM) 100 MG tablet; Take 1 tablet by mouth in the morning.  -     ezetimibe (ZETIA) 10 MG tablet; take 1 tablet by mouth once daily  -     DULoxetine (CYMBALTA) 30 MG extended release capsule; TAKE 1 CAPSULE BY MOUTH EVERY DAY  -     celecoxib (CELEBREX) 200 MG capsule; Take 1 capsule by mouth in the morning.  -     Comprehensive Metabolic Panel; Future  -     Sedimentation Rate; Future  -     CBC with Auto Differential; Future  -     Katie Richards MD, Cardiology, [place]    Stage 3a chronic kidney disease [facility])    Arthritis  -     Uric Acid; Future    Prediabetes  -     Hemoglobin A1C; Future  Patient will be trialed on Celebrex. Obviously this could increase bleeding risk and we would need to be careful about her history of congestive heart failure. I will have her back in 1 month. Lab work will be obtained today. Patient's gout is still present with tophi. I will measure a uric acid level to see if this is come down the if not then she will need much more aggressive therapy. She is to be back in 4 weeks to reassess.

## 2022-08-17 LAB — SEDIMENTATION RATE, ERYTHROCYTE: 2 MM/HR (ref 0–20)

## 2022-08-18 ENCOUNTER — TELEPHONE (OUTPATIENT)
Dept: CARDIOLOGY CLINIC | Age: 87
End: 2022-08-18

## 2022-08-18 DIAGNOSIS — Z79.01 ANTICOAGULANT LONG-TERM USE: ICD-10-CM

## 2022-08-18 DIAGNOSIS — I50.42 CHRONIC COMBINED SYSTOLIC AND DIASTOLIC HEART FAILURE (HCC): ICD-10-CM

## 2022-08-18 DIAGNOSIS — I50.32 CHRONIC HEART FAILURE WITH PRESERVED EJECTION FRACTION (HCC): ICD-10-CM

## 2022-08-18 DIAGNOSIS — I10 ESSENTIAL HYPERTENSION: ICD-10-CM

## 2022-08-18 DIAGNOSIS — E78.2 MIXED HYPERLIPIDEMIA: ICD-10-CM

## 2022-08-18 RX ORDER — ALLOPURINOL 100 MG/1
TABLET ORAL
Qty: 90 TABLET | Refills: 5 | Status: SHIPPED
Start: 2022-08-18 | End: 2022-09-17

## 2022-08-18 NOTE — TELEPHONE ENCOUNTER
She is a Dr. Jefferson Numbers patient. He does have openings for existing patients next week (8/25 and 8/26). Please schedule her there.

## 2022-08-18 NOTE — TELEPHONE ENCOUNTER
Ruslan Lobe to take two Allopurinol 100mg every day for 2 weeks, and then take 3 tablets a day everyday thereafter. I have this ready to send to [facility]. It is written for 3 a day, but she knows that she needs to take 2 for the first two weeks.       Last Appointment:  8/16/2022  Future Appointments   Date Time Provider Orlando Rodriguez   9/16/2022  9:30 AM Andris Koyanagi,  [address]

## 2022-08-18 NOTE — TELEPHONE ENCOUNTER
Shane Garrett placed new referral for pt. Per 's notes from 6/28/2021, Pt is to follow Dr. Le Perez. Unsure if this appt needs to be scheduled asap or wait till next available.

## 2022-08-22 ENCOUNTER — OFFICE VISIT (OUTPATIENT)
Dept: CARDIOLOGY CLINIC | Age: 87
End: 2022-08-22
Payer: MEDICARE

## 2022-08-22 VITALS
WEIGHT: 203.8 LBS | RESPIRATION RATE: 16 BRPM | DIASTOLIC BLOOD PRESSURE: 78 MMHG | HEART RATE: 93 BPM | HEIGHT: 67 IN | BODY MASS INDEX: 31.99 KG/M2 | SYSTOLIC BLOOD PRESSURE: 122 MMHG

## 2022-08-22 DIAGNOSIS — I48.0 PAROXYSMAL ATRIAL FIBRILLATION (HCC): ICD-10-CM

## 2022-08-22 DIAGNOSIS — M48.062 SPINAL STENOSIS OF LUMBAR REGION WITH NEUROGENIC CLAUDICATION: ICD-10-CM

## 2022-08-22 DIAGNOSIS — Z98.890 S/P AAA REPAIR: Primary | ICD-10-CM

## 2022-08-22 DIAGNOSIS — I50.32 CHRONIC HEART FAILURE WITH PRESERVED EJECTION FRACTION (HCC): ICD-10-CM

## 2022-08-22 DIAGNOSIS — J41.8 MIXED SIMPLE AND MUCOPURULENT CHRONIC BRONCHITIS (HCC): ICD-10-CM

## 2022-08-22 DIAGNOSIS — I10 ESSENTIAL HYPERTENSION: ICD-10-CM

## 2022-08-22 DIAGNOSIS — M48.02 SPINAL STENOSIS OF CERVICAL REGION: ICD-10-CM

## 2022-08-22 DIAGNOSIS — Z86.79 S/P AAA REPAIR: Primary | ICD-10-CM

## 2022-08-22 PROBLEM — S42.209A CLOSED FRACTURE OF PROXIMAL END OF HUMERUS: Status: ACTIVE | Noted: 2022-08-22

## 2022-08-22 PROCEDURE — 99214 OFFICE O/P EST MOD 30 MIN: CPT | Performed by: INTERNAL MEDICINE

## 2022-08-22 PROCEDURE — 1123F ACP DISCUSS/DSCN MKR DOCD: CPT | Performed by: INTERNAL MEDICINE

## 2022-08-22 PROCEDURE — 3023F SPIROM DOC REV: CPT | Performed by: INTERNAL MEDICINE

## 2022-08-22 PROCEDURE — G8428 CUR MEDS NOT DOCUMENT: HCPCS | Performed by: INTERNAL MEDICINE

## 2022-08-22 PROCEDURE — G8400 PT W/DXA NO RESULTS DOC: HCPCS | Performed by: INTERNAL MEDICINE

## 2022-08-22 PROCEDURE — 93000 ELECTROCARDIOGRAM COMPLETE: CPT | Performed by: INTERNAL MEDICINE

## 2022-08-22 PROCEDURE — 1036F TOBACCO NON-USER: CPT | Performed by: INTERNAL MEDICINE

## 2022-08-22 PROCEDURE — G8417 CALC BMI ABV UP PARAM F/U: HCPCS | Performed by: INTERNAL MEDICINE

## 2022-08-22 PROCEDURE — 1090F PRES/ABSN URINE INCON ASSESS: CPT | Performed by: INTERNAL MEDICINE

## 2022-08-22 NOTE — PROGRESS NOTES
Out Patient CARDIOLOGY Follow Up Visit    Name: Xavier Garnica    Age: 82 y.o. Date of Admission: No admission date for patient encounter. Date of Service: 8/22/2022    Reason for Consultation:   Chief Complaint   Patient presents with    Hypertension     Former [address] pt, ov per Dr. Judith Garner, No complaints today. Referring Physician: No admitting provider for patient encounter. History of Present Illness: 30-year-old female with history of multiple medical problems including but not limited to hypertension, hyperlipidemia, COPD, spinal stenosis s/p surgery in 2014, history of hepatitis A, AAA repair in 2005, paroxysmal atrial fibrillation for which she has been on Eliquis for anticoagulation and beta-blocker for rate control. She is also on Bumex for heart failure with preserved ejection fraction. She presented for follow-up visit today and reported being well and denies any symptoms. Her blood pressure is adequately controlled but the heart rate is on the upper limit of normal and given AAA repair patient is advised to go and check blood pressure at home and follow-up in 3 months with home blood pressure and heart rate record to guide therapy. During next visit if blood pressure is stable but heart rate continue to be on the upper limit of normal we will decrease lisinopril dose and instead uptitrate beta-blocker for optimal rate control. Medical History:  No history of MI, CHF, stroke, CKD. COPD. Hypertension. Hyperlipidemia. Spinal stenosis. Surgery, 10/20/2014. Hepatitis A. Tonsillectomy, appendectomy. Cigarette abuse, 15 pack years. Quit 1990. Allergy/intolerance to Crestor, Lipitor, sulfa. AAA repair, 2005, [address]. Dobutrex stress echo, 11/18/2005. Normal. Stage II diastolic dysfunction. Mild [facility]. Chest CT, 07/01/2009. Calcified granuloma. Right basilar infiltrate/atelectasis with small effusion. COPD.    AF. BAO guided cardioversion, 10/28/2014, [facility]. BAO stenosis     Thoracic back pain     Thoracic back pain/Scoliosis    Thoracic radiculopathy     Referred to Wilber Rogers       Past Surgical History:  Past Surgical History:   Procedure Laterality Date    ABDOMINAL AORTIC ANEURYSM REPAIR      [facility]    APPENDECTOMY      CARDIOVERSION  10/28/2014    OTHER SURGICAL HISTORY      epidural injections in spine    SPINE SURGERY  10/2014    TONSILLECTOMY         Family History:  Family History   Problem Relation Age of Onset    Stroke Mother     Heart Disease Father     Heart Disease Brother        Social History:  Social History     Socioeconomic History    Marital status:      Spouse name: Not on file    Number of children: Not on file    Years of education: Not on file    Highest education level: Not on file   Occupational History    Not on file   Tobacco Use    Smoking status: Former     Packs/day: 1.00     Years: 15.00     Pack years: 15.00     Types: Cigarettes     Quit date: 1990     Years since quittin.8    Smokeless tobacco: Never   Vaping Use    Vaping Use: Never used   Substance and Sexual Activity    Alcohol use: Yes     Comment: rarely    Drug use: No    Sexual activity: Not on file   Other Topics Concern    Not on file   Social History Narrative    Not on file     Social Determinants of Health     Financial Resource Strain: Low Risk     Difficulty of Paying Living Expenses: Not hard at all   Food Insecurity: No Food Insecurity    Worried About Running Out of Food in the Last Year: Never true    [address] in the Last Year: Never true   Transportation Needs: Not on file   Physical Activity: Insufficiently Active    Days of Exercise per Week: 7 days    Minutes of Exercise per Session: 20 min   Stress: Not on file   Social Connections: Not on file   Intimate Partner Violence: Not on file   Housing Stability: Not on file       Allergies:   Allergies   Allergen Reactions    Amoxicillin-Pot Clavulanate Diarrhea    Crestor [Rosuvastatin] Other (See Comments)     Muscle fatigue    Lipitor [Atorvastatin] Other (See Comments)     Muscle fatigue    Sulfa Antibiotics Hives       Home Medications:  Prior to Admission medications    Medication Sig Start Date End Date Taking? Authorizing Provider   allopurinol (ZYLOPRIM) 100 MG tablet Take 3 tablets by mouth every day. 8/18/22  Yes King Salguero MD   bumetanide (BUMEX) 2 MG tablet Take 1 tablet by mouth in the morning. 8/16/22  Yes Knig Salguero MD   simvastatin (ZOCOR) 40 MG tablet Take 1 tablet by mouth nightly 8/16/22  Yes King Salguero MD   ezetimibe (ZETIA) 10 MG tablet take 1 tablet by mouth once daily 8/16/22  Yes King Salguero MD   DULoxetine (CYMBALTA) 30 MG extended release capsule TAKE 1 CAPSULE BY MOUTH EVERY DAY 8/16/22  Yes King Salguero MD   celecoxib (CELEBREX) 200 MG capsule Take 1 capsule by mouth in the morning. 8/16/22  Yes King Salguero MD   colchicine (COLCRYS) 0.6 MG tablet take 1 tablet by mouth once daily 8/2/22  Yes King Salguero MD   apixaban (ELIQUIS) 5 MG TABS tablet TAKE 1 TABLET BY MOUTH TWICE A DAY 4/12/22  Yes King Salguero MD   metoprolol tartrate (LOPRESSOR) 50 MG tablet Take 1.5 tablets by mouth 2 times daily 4/12/22  Yes King Salguero MD   lisinopril (PRINIVIL;ZESTRIL) 40 MG tablet take 1 tablet by mouth once daily 4/5/22  Yes King Salguero MD   fluticasone (FLONASE) 50 MCG/ACT nasal spray 1 spray by Nasal route as needed for Rhinitis or Allergies 4/15/20  Yes King Salguero MD   Cholecalciferol (VITAMIN D3) 2000 units CAPS Take by mouth daily   Yes Historical Provider, MD   Multiple Vitamins-Minerals (THERAPEUTIC MULTIVITAMIN-MINERALS) tablet Take 1 tablet by mouth daily. Yes Historical Provider, MD   Calcium Carb-Cholecalciferol (CALCIUM + D3) 600-200 MG-UNIT TABS Take 1 tablet by mouth daily.    Yes Historical Provider, MD       Current Medications:  Current Outpatient Medications   Medication Sig Dispense Refill    allopurinol (ZYLOPRIM) 100 MG tablet Take 3 tablets by mouth every day. 90 tablet 5    bumetanide (BUMEX) 2 MG tablet Take 1 tablet by mouth in the morning. 90 tablet 1    simvastatin (ZOCOR) 40 MG tablet Take 1 tablet by mouth nightly 90 tablet 1    ezetimibe (ZETIA) 10 MG tablet take 1 tablet by mouth once daily 90 tablet 1    DULoxetine (CYMBALTA) 30 MG extended release capsule TAKE 1 CAPSULE BY MOUTH EVERY DAY 90 capsule 1    celecoxib (CELEBREX) 200 MG capsule Take 1 capsule by mouth in the morning. 30 capsule 0    colchicine (COLCRYS) 0.6 MG tablet take 1 tablet by mouth once daily 120 tablet 2    apixaban (ELIQUIS) 5 MG TABS tablet TAKE 1 TABLET BY MOUTH TWICE A DAY 60 tablet 5    metoprolol tartrate (LOPRESSOR) 50 MG tablet Take 1.5 tablets by mouth 2 times daily 270 tablet 3    lisinopril (PRINIVIL;ZESTRIL) 40 MG tablet take 1 tablet by mouth once daily 90 tablet 1    fluticasone (FLONASE) 50 MCG/ACT nasal spray 1 spray by Nasal route as needed for Rhinitis or Allergies 1 Bottle 5    Cholecalciferol (VITAMIN D3) 2000 units CAPS Take by mouth daily      Multiple Vitamins-Minerals (THERAPEUTIC MULTIVITAMIN-MINERALS) tablet Take 1 tablet by mouth daily. Calcium Carb-Cholecalciferol (CALCIUM + D3) 600-200 MG-UNIT TABS Take 1 tablet by mouth daily. No current facility-administered medications for this visit. Review of Systems:   Cardiac: As per HPI  General: Denies fever or chills  Pulmonary: As per HPI  HEENT: Denies runny nose  GI: No complaints  : No complaints  Endocrine: Denies night sweats  Musculoskeletal: No complaints  Skin: Dry skin  Neuro: No complaints  Psych: Denies depression    Physical Exam:  /78   Pulse 93   Resp 16   Ht 5' 7\" (1.702 m)   Wt 203 lb 12.8 oz (92.4 kg)   BMI 31.92 kg/m²   Wt Readings from Last 3 Encounters:   08/22/22 203 lb 12.8 oz (92.4 kg)   08/16/22 203 lb (92.1 kg)   08/16/22 203 lb (92.1 kg)       Appearance: Alert and oriented x3 not in acute distress.   Skin: Dry skin  Head: 55-65%. Mild to moderate mitral regurgitation with posterior jet. Aortic valve sclerosis. CXR reviewed: The ASCVD Risk score (Winsome De Dios., et al., 2013) failed to calculate for the following reasons: The 2013 ASCVD risk score is only valid for ages 36 to 78    ASSESSMENT / PLAN:    1. S/P AAA repair  Status post AAA repair in 2005 continue statin therapy, Zetia and beta-blocker  Heart rate is on the upper limit of normal and she will follow-up in 3 months with home blood pressure and heart rate record to see if beta-blocker dose need to be uptitrated    2. Essential hypertension  -Blood pressure is stable but heart rate in the upper limit of normal and he will follow-up with home vital signs record to see if lisinopril dose need to be decreased if blood pressure is stable and beta-blocker to be uptitrated if heart rate is high    3. Paroxysmal atrial fibrillation (HCC)  Continue on Eliquis and beta-blocker    4. Chronic heart failure with preserved ejection fraction (HCC)  Currently in NYHA class I  Follow low-salt diet    5. Mixed simple and mucopurulent chronic bronchitis (HCC)  Continue on statin and Zetia  6. Spinal stenosis of cervical region  Follow-up with your PCP  7. Spinal stenosis of lumbar region with neurogenic claudication  Follow-up with your PCP      Follow in Return in about 3 months (around 11/22/2022). Thank you for allowing me to participate in your patient's care. Please feel free to contact me if you have any questions or concerns.     Juan A Hassan MD  [facility]) Cardiology

## 2022-09-16 ENCOUNTER — OFFICE VISIT (OUTPATIENT)
Dept: FAMILY MEDICINE CLINIC | Age: 87
End: 2022-09-16
Payer: MEDICARE

## 2022-09-16 VITALS
BODY MASS INDEX: 31.79 KG/M2 | TEMPERATURE: 97.8 F | SYSTOLIC BLOOD PRESSURE: 150 MMHG | WEIGHT: 203 LBS | HEART RATE: 102 BPM | OXYGEN SATURATION: 100 % | DIASTOLIC BLOOD PRESSURE: 80 MMHG

## 2022-09-16 DIAGNOSIS — Z79.01 ANTICOAGULANT LONG-TERM USE: ICD-10-CM

## 2022-09-16 DIAGNOSIS — M19.90 ARTHRITIS: ICD-10-CM

## 2022-09-16 DIAGNOSIS — I10 ESSENTIAL HYPERTENSION: ICD-10-CM

## 2022-09-16 DIAGNOSIS — I50.42 CHRONIC COMBINED SYSTOLIC AND DIASTOLIC HEART FAILURE (HCC): ICD-10-CM

## 2022-09-16 DIAGNOSIS — E11.22 TYPE 2 DIABETES MELLITUS WITH STAGE 3A CHRONIC KIDNEY DISEASE, WITHOUT LONG-TERM CURRENT USE OF INSULIN (HCC): ICD-10-CM

## 2022-09-16 DIAGNOSIS — E11.9 TYPE 2 DIABETES MELLITUS WITHOUT COMPLICATION, WITHOUT LONG-TERM CURRENT USE OF INSULIN (HCC): ICD-10-CM

## 2022-09-16 DIAGNOSIS — M1A.09X0 CHRONIC GOUT OF MULTIPLE SITES, UNSPECIFIED CAUSE: Primary | ICD-10-CM

## 2022-09-16 DIAGNOSIS — N18.31 TYPE 2 DIABETES MELLITUS WITH STAGE 3A CHRONIC KIDNEY DISEASE, WITHOUT LONG-TERM CURRENT USE OF INSULIN (HCC): ICD-10-CM

## 2022-09-16 DIAGNOSIS — N18.31 STAGE 3A CHRONIC KIDNEY DISEASE (HCC): ICD-10-CM

## 2022-09-16 DIAGNOSIS — I50.32 CHRONIC HEART FAILURE WITH PRESERVED EJECTION FRACTION (HCC): ICD-10-CM

## 2022-09-16 DIAGNOSIS — E78.2 MIXED HYPERLIPIDEMIA: ICD-10-CM

## 2022-09-16 LAB
CREATININE URINE: 18 MG/DL (ref 29–226)
MICROALBUMIN UR-MCNC: <12 MG/L
MICROALBUMIN/CREAT UR-RTO: ABNORMAL (ref 0–30)

## 2022-09-16 PROCEDURE — 1123F ACP DISCUSS/DSCN MKR DOCD: CPT | Performed by: INTERNAL MEDICINE

## 2022-09-16 PROCEDURE — 1090F PRES/ABSN URINE INCON ASSESS: CPT | Performed by: INTERNAL MEDICINE

## 2022-09-16 PROCEDURE — 99214 OFFICE O/P EST MOD 30 MIN: CPT | Performed by: INTERNAL MEDICINE

## 2022-09-16 PROCEDURE — G8417 CALC BMI ABV UP PARAM F/U: HCPCS | Performed by: INTERNAL MEDICINE

## 2022-09-16 PROCEDURE — 1036F TOBACCO NON-USER: CPT | Performed by: INTERNAL MEDICINE

## 2022-09-16 PROCEDURE — G8400 PT W/DXA NO RESULTS DOC: HCPCS | Performed by: INTERNAL MEDICINE

## 2022-09-16 PROCEDURE — 3044F HG A1C LEVEL LT 7.0%: CPT | Performed by: INTERNAL MEDICINE

## 2022-09-16 PROCEDURE — G8427 DOCREV CUR MEDS BY ELIG CLIN: HCPCS | Performed by: INTERNAL MEDICINE

## 2022-09-16 RX ORDER — SIMVASTATIN 40 MG
40 TABLET ORAL NIGHTLY
Qty: 90 TABLET | Refills: 1 | Status: SHIPPED | OUTPATIENT
Start: 2022-09-16

## 2022-09-16 RX ORDER — DULOXETIN HYDROCHLORIDE 30 MG/1
CAPSULE, DELAYED RELEASE ORAL
Qty: 90 CAPSULE | Refills: 1 | Status: SHIPPED | OUTPATIENT
Start: 2022-09-16

## 2022-09-16 RX ORDER — EZETIMIBE 10 MG/1
TABLET ORAL
Qty: 90 TABLET | Refills: 1 | Status: SHIPPED | OUTPATIENT
Start: 2022-09-16

## 2022-09-16 RX ORDER — HYDROCODONE BITARTRATE AND ACETAMINOPHEN 5; 325 MG/1; MG/1
1 TABLET ORAL EVERY 6 HOURS PRN
Qty: 120 TABLET | Refills: 0 | Status: SHIPPED | OUTPATIENT
Start: 2022-09-16 | End: 2022-10-16

## 2022-09-16 ASSESSMENT — ENCOUNTER SYMPTOMS
EYES NEGATIVE: 1
BACK PAIN: 1
RESPIRATORY NEGATIVE: 1
ALLERGIC/IMMUNOLOGIC NEGATIVE: 1

## 2022-09-16 NOTE — PROGRESS NOTES
2022    Sherran Angelucci Barrett (:  1935) is a 82 y.o. female, here for evaluation of the following medical concerns:    States that she has had a nice response to the Celebrex especially for her arthritic symptoms. 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 She has increased her allopurinol to 300 mg. Her initial uric acid level was 10. It then went down to 7.6 with 100 mg of allopurinol. The patient has not had bleeding or bruising. Unfortunately her blood sugars have risen and she is now at the diabetic threshold. We will get a urine for microalbumin today. We will also assess her uric acid level and kidney and liver function. We have advised her to lose some weight. Her blood pressures are assessed from home and these have been very well controlled. Her average systolic is probably between 125 and 130 at home and her average diastolic is less than 80. Her average heart rate is about 70. She did see cardiology and I did review their note. Other    Back Pain    Hyperlipidemia    Hypertension        Review of Systems   Constitutional: Negative. HENT: Negative. Eyes: Negative. Respiratory: Negative. Cardiovascular:         Atrial fibrillation without evidence of rapid ventricular response. Improved symptomatology with increased diuresis. Gastrointestinal:         Denies abdominal pain. Endocrine:        Diabetes with a hemoglobin A1c of 6.5   Genitourinary: Negative. Musculoskeletal:  Positive for back pain. Thoracic and lumbar spine pain. Generalized osteoarthritis pain. Improved symptoms since being placed on duloxetine. Allergic/Immunologic: Negative. Neurological: Negative. Hematological: Negative. Psychiatric/Behavioral:          Improved depression with the use of Cymbalta.    Problem List: Malaise and fatigue, Essential hypertension, Hyperlipidemia  Health Maintenance:  Colonoscopy - (2014)  Bone Density Test Screening - (2009)  Couseled on Home Safety - (10/12/2015)  Influenza Vaccination - (2020)  Tdap - (2017) DISCUSSED  Zoster/Shingles Vaccine - (2017) DISCUSSED  Shingrix Vaccine (Shingles) - (2018) SLIP GIVEN  Medical Problems:  Hypertension, Hyperlipidemia  Atrial Fibrillation - (10/2014) POST SURGERY-[facility]  Abdominal Aortic Aneurysm - CT   Thoracic Back Pain/Scoliosis  Thoracic Radiculopathy - REFERRED TO [facility]  Chronic RLL Infiltate - [facility]  Lumbar Spinal Stenosis - [facility] 2/10 improved with therapy- REFERRED TO [facility] 13-recurrent- failed PT-MRI ordered 3/16/2021  Incisional Hernia - (2012) CAUSING PAIN  Carotid Artery Stenosis - ()  Depression- improved with Cymbalta 2021  CHF p EF 2021  Surgical Hx:  AAA - LAST CT , ORDERED 11/10-normal CT   Tonsillectomy  Appendectomy - 2011 GANGRENOUS  Lumbar Surgery - (2014) ERIK  OB/Gyn Hx:: (4)Parity: Full term (3), one miscarriage  Reviewed, no changes. FH:  Father:  MI -  age 67. Mother:  Cerebrovascular Accident (CVA) -  age 80. Reviewed, no changes. SH:  Marital: . Personal Habits: Alcohol: Occasionally consumes wine. Exercise Type: Does housework daily. Reviewed, no changes. Date: 2022  Was the patient queried about smoking behavior? Yes   Does the patient currently smoke? Smoking: Patient is a former smoker. Prior to Visit Medications    Medication Sig Taking? Authorizing Provider   allopurinol (ZYLOPRIM) 100 MG tablet Take 3 tablets by mouth every day. Nirav Quinn MD   bumetanide (BUMEX) 2 MG tablet Take 1 tablet by mouth in the morning.   Nirav Quinn MD   simvastatin (ZOCOR) 40 MG tablet Take 1 tablet by mouth nightly  Nirav Quinn MD   ezetimibe (ZETIA) 10 MG tablet take 1 tablet by mouth once daily  Nirav Quinn MD   DULoxetine (CYMBALTA) 30 MG extended release capsule TAKE 1 CAPSULE BY MOUTH EVERY DAY  Simone Loving MD   celecoxib (CELEBREX) 200 MG capsule Take 1 capsule by mouth in the morning. Simone Loving MD   colchicine (COLCRYS) 0.6 MG tablet take 1 tablet by mouth once daily  Simone Loving MD   apixaban (ELIQUIS) 5 MG TABS tablet TAKE 1 TABLET BY MOUTH TWICE A DAY  Simone Loving MD   metoprolol tartrate (LOPRESSOR) 50 MG tablet Take 1.5 tablets by mouth 2 times daily  Simone Loving MD   lisinopril (PRINIVIL;ZESTRIL) 40 MG tablet take 1 tablet by mouth once daily  Simone Loving MD   fluticasone (FLONASE) 50 MCG/ACT nasal spray 1 spray by Nasal route as needed for Rhinitis or Allergies  Simone Loving MD   Cholecalciferol (VITAMIN D3) 2000 units CAPS Take by mouth daily  Historical Provider, MD   Multiple Vitamins-Minerals (THERAPEUTIC MULTIVITAMIN-MINERALS) tablet Take 1 tablet by mouth daily. Historical Provider, MD   Calcium Carb-Cholecalciferol (CALCIUM + D3) 600-200 MG-UNIT TABS Take 1 tablet by mouth daily.   Historical Provider, MD        Allergies   Allergen Reactions    Amoxicillin-Pot Clavulanate Diarrhea    Crestor [Rosuvastatin] Other (See Comments)     Muscle fatigue    Lipitor [Atorvastatin] Other (See Comments)     Muscle fatigue    Sulfa Antibiotics Hives       Past Medical History:   Diagnosis Date    AAA (abdominal aortic aneurysm) ([facility] [address]     Anticoagulant long-term use     Atrial fibrillation ([address]     Carotid artery stenosis     Hepatitis A     Hyperlipidemia     Hypertension     Incisional hernia     Spinal stenosis     Thoracic back pain     Thoracic back pain/Scoliosis    Thoracic radiculopathy     [facility]       Past Surgical History:   Procedure Laterality Date    ABDOMINAL AORTIC ANEURYSM REPAIR  2005    [facility]    APPENDECTOMY      CARDIOVERSION  10/28/2014    OTHER SURGICAL HISTORY      epidural injections in spine    SPINE SURGERY  10/2014    TONSILLECTOMY         Social History     Socioeconomic History    Marital status:      Spouse name: Not on file    Number of children: Not on file    Years of education: Not on file    Highest education level: Not on file   Occupational History    Not on file   Tobacco Use    Smoking status: Former     Packs/day: 1.00     Years: 15.00     Pack years: 15.00     Types: Cigarettes     Quit date: 1990     Years since quittin.8    Smokeless tobacco: Never   Vaping Use    Vaping Use: Never used   Substance and Sexual Activity    Alcohol use: Yes     Comment: rarely    Drug use: No    Sexual activity: Not on file   Other Topics Concern    Not on file   Social History Narrative    Not on file     Social Determinants of Health     Financial Resource Strain: Low Risk     Difficulty of Paying Living Expenses: Not hard at all   Food Insecurity: No Food Insecurity    Worried About Running Out of Food in the Last Year: Never true    [address] in the Last Year: Never true   Transportation Needs: Not on file   Physical Activity: Insufficiently Active    Days of Exercise per Week: 7 days    Minutes of Exercise per Session: 20 min   Stress: Not on file   Social Connections: Not on file   Intimate Partner Violence: Not on file   Housing Stability: Not on file        Family History   Problem Relation Age of Onset    Stroke Mother     Heart Disease Father     Heart Disease Brother        There were no vitals filed for this visit. Estimated body mass index is 31.92 kg/m² as calculated from the following:    Height as of 22: 5' 7\" (1.702 m). Weight as of 22: 203 lb 12.8 oz (92.4 kg). Physical Exam  Const: Appears healthy, well developed and well nourished. Appears obese. Eyes: EOMI in both eyes. PERRL. ENMT: External ears WNL. Tympanic membranes are intact. External nose WNL. Neck: Supple and symmetric. Palpation reveals no adenopathy. No masses appreciated. Thyroid: no nodule  appreciated or thyromegaly. No jugular venous distention. Resp: Respirations are unlabored.  Respiration rate is normal. Auscultate good airflow. Minimal crackles at the bases. CV: Rhythm is irregularly irregular. S1 is normal. S2 is normal. Carotids: no bruits. Abdominal aorta: not palpable. Pedal  pulses: 2+ and equal bilaterally. Extremities: No clubbing, cyanosis . No edema today of the lower extremity. Abdomen: Bowel sounds are normoactive. Palpation reveals softness, with no distension, organomegaly or  tenderness. No abdominal masses palpable. No palpable hepatosplenomegaly. Musculo: Walks with a limping gait. Upper Extremities: ROM: Significant limitation in range of motion of the left  shoulder. Lower Extremities: ROM: Significant limitation of range of motion of the left hip. Pain with inversion and eversion. Skin: Dry and warm with no rash. Neuro: Alert and oriented x3. Mood is normal. Affect is normal. Speech is articulate and fluent. Deep tendon reflexes are absent both patellar and plantar bilaterally. No evidence of erythema, warmth, or fluctuance  Twin Salmeron was seen today for follow-up. Diagnoses and all orders for this visit:    Chronic gout of multiple sites, unspecified cause    Arthritis  -     HYDROcodone-acetaminophen (NORCO) 5-325 MG per tablet; Take 1 tablet by mouth every 6 hours as needed for Pain for up to 30 days. -     Uric Acid; Future  -     Comprehensive Metabolic Panel; Future  -     Microalbumin / Creatinine Urine Ratio; Future    Essential hypertension  -     DULoxetine (CYMBALTA) 30 MG extended release capsule; TAKE 1 CAPSULE BY MOUTH EVERY DAY  -     ezetimibe (ZETIA) 10 MG tablet; take 1 tablet by mouth once daily  -     simvastatin (ZOCOR) 40 MG tablet; Take 1 tablet by mouth nightly  -     Uric Acid; Future  -     Comprehensive Metabolic Panel; Future  -     Microalbumin / Creatinine Urine Ratio;  Future    Anticoagulant long-term use  -     DULoxetine (CYMBALTA) 30 MG extended release capsule; TAKE 1 CAPSULE BY MOUTH EVERY DAY  -     ezetimibe (ZETIA) 10 MG tablet; take 1 tablet by mouth once daily  -     simvastatin (ZOCOR) 40 MG tablet; Take 1 tablet by mouth nightly  -     Uric Acid; Future  -     Comprehensive Metabolic Panel; Future  -     Microalbumin / Creatinine Urine Ratio; Future    Chronic combined systolic and diastolic heart failure (HCC)  -     DULoxetine (CYMBALTA) 30 MG extended release capsule; TAKE 1 CAPSULE BY MOUTH EVERY DAY  -     ezetimibe (ZETIA) 10 MG tablet; take 1 tablet by mouth once daily  -     simvastatin (ZOCOR) 40 MG tablet; Take 1 tablet by mouth nightly  -     Uric Acid; Future  -     Comprehensive Metabolic Panel; Future  -     Microalbumin / Creatinine Urine Ratio; Future    Chronic heart failure with preserved ejection fraction (HCC)  -     DULoxetine (CYMBALTA) 30 MG extended release capsule; TAKE 1 CAPSULE BY MOUTH EVERY DAY  -     ezetimibe (ZETIA) 10 MG tablet; take 1 tablet by mouth once daily  -     simvastatin (ZOCOR) 40 MG tablet; Take 1 tablet by mouth nightly    Mixed hyperlipidemia  -     DULoxetine (CYMBALTA) 30 MG extended release capsule; TAKE 1 CAPSULE BY MOUTH EVERY DAY  -     ezetimibe (ZETIA) 10 MG tablet; take 1 tablet by mouth once daily  -     simvastatin (ZOCOR) 40 MG tablet; Take 1 tablet by mouth nightly  -     Uric Acid; Future  -     Comprehensive Metabolic Panel; Future  -     Microalbumin / Creatinine Urine Ratio; Future    Type 2 diabetes mellitus without complication, without long-term current use of insulin (HCC)  -     Microalbumin / Creatinine Urine Ratio; Future    Type 2 diabetes mellitus with stage 3a chronic kidney disease, without long-term current use of insulin ([facility])    Stage 3a chronic kidney disease ([facility]    Other orders  -     apixaban (ELIQUIS) 5 MG TABS tablet; TAKE 1 TABLET BY MOUTH TWICE A DAY  The patient is to continue her current regimen of medications. She does have follow-up with cardiology in several months. Home blood pressures at home with pulses seem to be adequate. I will get lab work today.   We will try to get uric acid levels below 5 to improve the tophaceous gout. I told her this will take quite a long time. I told her we need to be extremely careful with Celebrex use. She has had nothing consistent with increased edema or congestive heart failure symptomatology.

## 2022-09-17 DIAGNOSIS — I50.32 CHRONIC HEART FAILURE WITH PRESERVED EJECTION FRACTION (HCC): ICD-10-CM

## 2022-09-17 DIAGNOSIS — E78.2 MIXED HYPERLIPIDEMIA: ICD-10-CM

## 2022-09-17 DIAGNOSIS — I10 ESSENTIAL HYPERTENSION: ICD-10-CM

## 2022-09-17 DIAGNOSIS — Z79.01 ANTICOAGULANT LONG-TERM USE: ICD-10-CM

## 2022-09-17 DIAGNOSIS — I50.42 CHRONIC COMBINED SYSTOLIC AND DIASTOLIC HEART FAILURE (HCC): ICD-10-CM

## 2022-09-17 LAB
ALBUMIN SERPL-MCNC: 4.9 G/DL (ref 3.5–5.2)
ALP BLD-CCNC: 54 U/L (ref 35–104)
ALT SERPL-CCNC: 12 U/L (ref 0–32)
ANION GAP SERPL CALCULATED.3IONS-SCNC: 19 MMOL/L (ref 7–16)
AST SERPL-CCNC: 19 U/L (ref 0–31)
BILIRUB SERPL-MCNC: 0.7 MG/DL (ref 0–1.2)
BUN BLDV-MCNC: 30 MG/DL (ref 6–23)
CALCIUM SERPL-MCNC: 10.3 MG/DL (ref 8.6–10.2)
CHLORIDE BLD-SCNC: 101 MMOL/L (ref 98–107)
CO2: 23 MMOL/L (ref 22–29)
CREAT SERPL-MCNC: 1.2 MG/DL (ref 0.5–1)
GFR AFRICAN AMERICAN: 52
GFR NON-AFRICAN AMERICAN: 43 ML/MIN/1.73
GLUCOSE BLD-MCNC: 75 MG/DL (ref 74–99)
POTASSIUM SERPL-SCNC: 4.2 MMOL/L (ref 3.5–5)
SODIUM BLD-SCNC: 143 MMOL/L (ref 132–146)
TOTAL PROTEIN: 7.4 G/DL (ref 6.4–8.3)
URIC ACID, SERUM: 5.7 MG/DL (ref 2.4–5.7)

## 2022-09-17 RX ORDER — ALLOPURINOL 100 MG/1
TABLET ORAL
Qty: 120 TABLET | Refills: 5 | Status: SHIPPED | OUTPATIENT
Start: 2022-09-17

## 2022-09-18 DIAGNOSIS — Z79.01 ANTICOAGULANT LONG-TERM USE: ICD-10-CM

## 2022-09-18 DIAGNOSIS — E78.2 MIXED HYPERLIPIDEMIA: ICD-10-CM

## 2022-09-18 DIAGNOSIS — I50.32 CHRONIC HEART FAILURE WITH PRESERVED EJECTION FRACTION (HCC): ICD-10-CM

## 2022-09-18 DIAGNOSIS — I50.42 CHRONIC COMBINED SYSTOLIC AND DIASTOLIC HEART FAILURE (HCC): ICD-10-CM

## 2022-09-18 DIAGNOSIS — I10 ESSENTIAL HYPERTENSION: ICD-10-CM

## 2022-09-19 RX ORDER — CELECOXIB 200 MG/1
CAPSULE ORAL
Qty: 30 CAPSULE | Refills: 0 | OUTPATIENT
Start: 2022-09-19

## 2022-09-21 DIAGNOSIS — I10 ESSENTIAL HYPERTENSION: ICD-10-CM

## 2022-09-21 DIAGNOSIS — I50.32 CHRONIC HEART FAILURE WITH PRESERVED EJECTION FRACTION (HCC): ICD-10-CM

## 2022-09-21 DIAGNOSIS — E78.2 MIXED HYPERLIPIDEMIA: ICD-10-CM

## 2022-09-21 DIAGNOSIS — I50.42 CHRONIC COMBINED SYSTOLIC AND DIASTOLIC HEART FAILURE (HCC): ICD-10-CM

## 2022-09-21 DIAGNOSIS — Z79.01 ANTICOAGULANT LONG-TERM USE: ICD-10-CM

## 2022-09-21 RX ORDER — CELECOXIB 200 MG/1
200 CAPSULE ORAL DAILY
Qty: 90 CAPSULE | Refills: 1 | Status: SHIPPED | OUTPATIENT
Start: 2022-09-21

## 2022-09-21 RX ORDER — LISINOPRIL 40 MG/1
TABLET ORAL
Qty: 90 TABLET | Refills: 1 | Status: SHIPPED | OUTPATIENT
Start: 2022-09-21

## 2022-09-21 NOTE — TELEPHONE ENCOUNTER
Last Appointment:  9/16/2022  Future Appointments   Date Time Provider Orlando Pereyrai   11/14/2022 10:30 AM Mainor Gerard MD [address] [facility]   12/20/2022  1:30 PM Indigo Kasper  [address] [place] [address]

## 2022-09-21 NOTE — TELEPHONE ENCOUNTER
Last Appointment:  9/16/2022  Future Appointments   Date Time Provider Orlando Jennifer   11/14/2022 10:30 AM Manuel Mchugh MD [address] [facility]   12/20/2022  1:30 PM Adan Van  [address] [place] [address]

## 2022-11-14 ENCOUNTER — OFFICE VISIT (OUTPATIENT)
Dept: CARDIOLOGY CLINIC | Age: 87
End: 2022-11-14
Payer: MEDICARE

## 2022-11-14 VITALS
BODY MASS INDEX: 32.47 KG/M2 | SYSTOLIC BLOOD PRESSURE: 136 MMHG | HEIGHT: 67 IN | WEIGHT: 206.9 LBS | RESPIRATION RATE: 18 BRPM | HEART RATE: 92 BPM | DIASTOLIC BLOOD PRESSURE: 74 MMHG

## 2022-11-14 DIAGNOSIS — E11.9 TYPE 2 DIABETES MELLITUS WITHOUT COMPLICATION, WITHOUT LONG-TERM CURRENT USE OF INSULIN (HCC): ICD-10-CM

## 2022-11-14 DIAGNOSIS — E78.2 MIXED HYPERLIPIDEMIA: ICD-10-CM

## 2022-11-14 DIAGNOSIS — I48.0 PAROXYSMAL ATRIAL FIBRILLATION (HCC): Primary | ICD-10-CM

## 2022-11-14 DIAGNOSIS — I10 ESSENTIAL HYPERTENSION: ICD-10-CM

## 2022-11-14 DIAGNOSIS — Z79.01 ANTICOAGULANT LONG-TERM USE: ICD-10-CM

## 2022-11-14 DIAGNOSIS — N18.31 STAGE 3A CHRONIC KIDNEY DISEASE (HCC): ICD-10-CM

## 2022-11-14 DIAGNOSIS — I50.32 CHRONIC HEART FAILURE WITH PRESERVED EJECTION FRACTION (HCC): ICD-10-CM

## 2022-11-14 DIAGNOSIS — I50.42 CHRONIC COMBINED SYSTOLIC AND DIASTOLIC HEART FAILURE (HCC): ICD-10-CM

## 2022-11-14 PROCEDURE — G8484 FLU IMMUNIZE NO ADMIN: HCPCS | Performed by: INTERNAL MEDICINE

## 2022-11-14 PROCEDURE — G8417 CALC BMI ABV UP PARAM F/U: HCPCS | Performed by: INTERNAL MEDICINE

## 2022-11-14 PROCEDURE — 3044F HG A1C LEVEL LT 7.0%: CPT | Performed by: INTERNAL MEDICINE

## 2022-11-14 PROCEDURE — 3074F SYST BP LT 130 MM HG: CPT | Performed by: INTERNAL MEDICINE

## 2022-11-14 PROCEDURE — 93000 ELECTROCARDIOGRAM COMPLETE: CPT | Performed by: INTERNAL MEDICINE

## 2022-11-14 PROCEDURE — 99214 OFFICE O/P EST MOD 30 MIN: CPT | Performed by: INTERNAL MEDICINE

## 2022-11-14 PROCEDURE — G8400 PT W/DXA NO RESULTS DOC: HCPCS | Performed by: INTERNAL MEDICINE

## 2022-11-14 PROCEDURE — G8427 DOCREV CUR MEDS BY ELIG CLIN: HCPCS | Performed by: INTERNAL MEDICINE

## 2022-11-14 PROCEDURE — 1036F TOBACCO NON-USER: CPT | Performed by: INTERNAL MEDICINE

## 2022-11-14 PROCEDURE — 1090F PRES/ABSN URINE INCON ASSESS: CPT | Performed by: INTERNAL MEDICINE

## 2022-11-14 PROCEDURE — 1123F ACP DISCUSS/DSCN MKR DOCD: CPT | Performed by: INTERNAL MEDICINE

## 2022-11-14 PROCEDURE — 3078F DIAST BP <80 MM HG: CPT | Performed by: INTERNAL MEDICINE

## 2022-11-14 NOTE — PROGRESS NOTES
Out Patient CARDIOLOGY Follow Up Visit    Name: Zhou Sotelo    Age: 82 y.o. Date of Admission: No admission date for patient encounter. Date of Service: 11/14/2022    Reason for Consultation:   Chief Complaint   Patient presents with    Follow-up     3 month ov          Referring Physician: No admitting provider for patient encounter. History of Present Illness: 77-year-old female with history of multiple medical problems including but not limited to hypertension, hyperlipidemia, COPD, spinal stenosis s/p surgery in 2014, history of hepatitis A, AAA repair in 2005, paroxysmal atrial fibrillation for which she has been on Eliquis for anticoagulation and beta-blocker for rate control. She is also on Bumex for heart failure with preserved ejection fraction. She presented for follow-up visit today and reported being well and denies any symptoms. Her main complaint joint pains for which she is following up with her PCP. It is in the upper limit of normal and given her history of AAA repair in 2005 I discussed uptitrating her beta-blocker to improve heart rate but the patient reports she is having no symptoms and subsequently was not interested in medication changes. TSH was arranged and she will obtain this blood work with next blood draw arranged by her PCP. Medical History:  No history of MI, CHF, stroke, CKD. COPD. Hypertension. Hyperlipidemia. Spinal stenosis. Surgery, 10/20/2014. Hepatitis A. Tonsillectomy, appendectomy. Cigarette abuse, 15 pack years. Quit 1990. Allergy/intolerance to Crestor, Lipitor, sulfa. AAA repair, 2005, [address]. Dobutrex stress echo, 11/18/2005. Normal. Stage II diastolic dysfunction. Mild [facility]. Chest CT, 07/01/2009. Calcified granuloma. Right basilar infiltrate/atelectasis with small effusion. COPD. AF. BAO guided cardioversion, 10/28/2014, [facility]. BAO showed EF 50%. No chamber dilatation. No thrombus or SEC in NIMESH.  No hemodynamically significant valvular abnormality. The descending thoracic aorta had extensive atherosclerosis with Grade III-IV atheroma protruding into the lumen. Extensive disease throughout the descending aorta. Arch unable to be clearly assessed. Chronic anticoagulation with Eliquis started 10/2014. Lexiscan MPS, 11/11/2014. Normal EF. Soft tissue attenuation. Normal perfusion. Low risk/low probability. OP cardiac evaluation, 05/05/2015. Recurrent AF which, based on symptoms, started 05/04/2015. Rate 122. Prescribed Lopressor 50 mg b.i.d. and continuation of Xarelto. 24-hour ambulatory monitor, 05/14/2015. AF. Low/high rate 65/145, mean 112 bpm. No symptoms. No significant pauses. Occasional PVC's. OP BAO guided DCCV, 05/29/2015. Successful conversion of AF to sinus mechanism. OP cardiac evaluation, 10/06/2015. Atrial fibrillation with ventricular response, 96 per minute. OP cardiac evaluation, 04/26/2016, fatigue and dyspnea. Possible depression. Atrial fibrillation with ventricular response 100. Holter monitor to be obtained. 24-hour Holter monitor, 04/29/2016. AF. Average ventricular response 92 bpm.  No prolonged pauses. OP cardiac assessment, 10/25/2016. Asymptomatic. Using apixaban. Ultrasound showed carotid on the left 100% stenosis (per patient). Lexiscan stress MPS, 07/09/2019. Normal perfusion. Normal EF. Low risk. Echo, 11/06/2019. Atrial fibrillation. Severe left atrial dilatation. Left ventricle not dilated. Estimated EF 55-65%. Mild to moderate mitral regurgitation with posterior jet. Aortic valve sclerosis.           Past Medical History:  Past Medical History:   Diagnosis Date    AAA (abdominal aortic aneurysm)     Anticoagulant long-term use     Atrial fibrillation (HCC)     Carotid artery stenosis     Hepatitis A     Hyperlipidemia     Hypertension     Incisional hernia     Spinal stenosis     Thoracic back pain     Thoracic back pain/Scoliosis    Thoracic radiculopathy Sulfa Antibiotics Hives       Home Medications:  Prior to Admission medications    Medication Sig Start Date End Date Taking? Authorizing Provider   lisinopril (PRINIVIL;ZESTRIL) 40 MG tablet take 1 tablet by mouth once daily 9/21/22  Yes Luisito Davila MD   celecoxib (CELEBREX) 200 MG capsule Take 1 capsule by mouth daily 9/21/22  Yes Luisito Davila MD   allopurinol (ZYLOPRIM) 100 MG tablet Take 4 tablets by mouth every day. 9/17/22  Yes Luisito Davila MD   apixaban (ELIQUIS) 5 MG TABS tablet TAKE 1 TABLET BY MOUTH TWICE A DAY 9/16/22  Yes Luisito Davila MD   DULoxetine (CYMBALTA) 30 MG extended release capsule TAKE 1 CAPSULE BY MOUTH EVERY DAY 9/16/22  Yes Luisito Davila MD   ezetimibe (ZETIA) 10 MG tablet take 1 tablet by mouth once daily 9/16/22  Yes Luisito Davila MD   simvastatin (ZOCOR) 40 MG tablet Take 1 tablet by mouth nightly 9/16/22  Yes Luisito Davila MD   bumetanide (BUMEX) 2 MG tablet Take 1 tablet by mouth in the morning. 8/16/22  Yes Luisito Davila MD   colchicine (COLCRYS) 0.6 MG tablet take 1 tablet by mouth once daily 8/2/22  Yes Luisito Davila MD   metoprolol tartrate (LOPRESSOR) 50 MG tablet Take 1.5 tablets by mouth 2 times daily 4/12/22  Yes Luisito Davila MD   fluticasone (FLONASE) 50 MCG/ACT nasal spray 1 spray by Nasal route as needed for Rhinitis or Allergies 4/15/20  Yes Luisito Davila MD   Cholecalciferol (VITAMIN D3) 2000 units CAPS Take by mouth daily   Yes Historical Provider, MD   Multiple Vitamins-Minerals (THERAPEUTIC MULTIVITAMIN-MINERALS) tablet Take 1 tablet by mouth daily. Yes Historical Provider, MD   Calcium Carb-Cholecalciferol (CALCIUM + D3) 600-200 MG-UNIT TABS Take 1 tablet by mouth daily.    Yes Historical Provider, MD       Current Medications:  Current Outpatient Medications   Medication Sig Dispense Refill    lisinopril (PRINIVIL;ZESTRIL) 40 MG tablet take 1 tablet by mouth once daily 90 tablet 1    celecoxib (CELEBREX) 200 MG capsule Take 1 capsule by mouth daily 90 capsule 1    allopurinol (ZYLOPRIM) 100 MG tablet Take 4 tablets by mouth every day. 120 tablet 5    apixaban (ELIQUIS) 5 MG TABS tablet TAKE 1 TABLET BY MOUTH TWICE A DAY 60 tablet 5    DULoxetine (CYMBALTA) 30 MG extended release capsule TAKE 1 CAPSULE BY MOUTH EVERY DAY 90 capsule 1    ezetimibe (ZETIA) 10 MG tablet take 1 tablet by mouth once daily 90 tablet 1    simvastatin (ZOCOR) 40 MG tablet Take 1 tablet by mouth nightly 90 tablet 1    bumetanide (BUMEX) 2 MG tablet Take 1 tablet by mouth in the morning. 90 tablet 1    colchicine (COLCRYS) 0.6 MG tablet take 1 tablet by mouth once daily 120 tablet 2    metoprolol tartrate (LOPRESSOR) 50 MG tablet Take 1.5 tablets by mouth 2 times daily 270 tablet 3    fluticasone (FLONASE) 50 MCG/ACT nasal spray 1 spray by Nasal route as needed for Rhinitis or Allergies 1 Bottle 5    Cholecalciferol (VITAMIN D3) 2000 units CAPS Take by mouth daily      Multiple Vitamins-Minerals (THERAPEUTIC MULTIVITAMIN-MINERALS) tablet Take 1 tablet by mouth daily. Calcium Carb-Cholecalciferol (CALCIUM + D3) 600-200 MG-UNIT TABS Take 1 tablet by mouth daily. No current facility-administered medications for this visit. Review of Systems:   Cardiac: As per HPI  General: Denies fever or chills  Pulmonary: As per HPI  HEENT: Denies runny nose  GI: No complaints  : No complaints  Endocrine: Denies night sweats  Musculoskeletal: No complaints  Skin: Dry skin  Neuro: No complaints  Psych: Denies depression    Physical Exam:  /74   Pulse 92   Resp 18   Ht 5' 7\" (1.702 m)   Wt 206 lb 14.4 oz (93.8 kg)   BMI 32.41 kg/m²   Wt Readings from Last 3 Encounters:   11/14/22 206 lb 14.4 oz (93.8 kg)   09/16/22 203 lb (92.1 kg)   08/22/22 203 lb 12.8 oz (92.4 kg)       Appearance: Alert and oriented x3 not in acute distress.   Skin: Dry skin  Head: Atraumatic  Eyes: Intact extraocular muscles   ENMT: Mucous membranes are moist  Neck: Supple  Lungs: Clear to auscultation  Cardiac: Normal S1 and S2  Abdomen: Protuberant  Extremities: Intact range of motion  Neurologic: No focal neurological deficits  Peripheral Pulses: 2+ peripheral pulses      Laboratory Tests:  No results for input(s): NA, K, CL, CO2, BUN, CREATININE, GLUCOSE, CALCIUM in the last 72 hours. Lab Results   Component Value Date/Time    MG 2.5 08/27/2021 10:15 AM     No results for input(s): ALKPHOS, ALT, AST, PROT, BILITOT, BILIDIR, LABALBU in the last 72 hours. No results for input(s): WBC, RBC, HGB, HCT, MCV, MCH, MCHC, RDW, PLT, MPV in the last 72 hours. No results found for: CKTOTAL, CKMB, CKMBINDEX, TROPONINI  No results for input(s): CKTOTAL, CKMB, CKMBINDEX, TROPHS in the last 72 hours. No results found for: INR, PROTIME  No results found for: TSHFT4, TSH  Lab Results   Component Value Date    LABA1C 6.5 (H) 08/16/2022    LABA1C 6.1 07/10/2020    LABA1C 6.0 04/04/2019     No results found for: EAG  Lab Results   Component Value Date    CHOL 140 04/12/2022    CHOL 147 07/10/2020     Lab Results   Component Value Date    TRIG 167 (H) 04/12/2022    TRIG 177 (H) 07/10/2020     Lab Results   Component Value Date    HDL 36 04/12/2022    HDL 39 07/10/2020     Lab Results   Component Value Date    LDLCALC 71 04/12/2022    LDLCALC 73 07/10/2020     Lab Results   Component Value Date    LABVLDL 33 04/12/2022    LABVLDL 35 07/10/2020     No results found for: CHOLHDLRATIO  No results for input(s): PROBNP in the last 72 hours. Cardiac Tests:  EKG reviewed (EKG date: Atrial fibrillation 92 bpm occasional PVCs):      Echocardiogram reviewed:     Stress test reviewed:      Cardiac catheterization reviewed:     Lexiscan stress MPS, 07/09/2019. Normal perfusion. Normal EF. Low risk. Echo, 11/06/2019. Atrial fibrillation. Severe left atrial dilatation. Left ventricle not dilated. Estimated EF 55-65%. Mild to moderate mitral regurgitation with posterior jet. Aortic valve sclerosis.         CXR reviewed: The ASCVD Risk score (Philipp ANDERSON, et al., 2019) failed to calculate for the following reasons: The 2019 ASCVD risk score is only valid for ages 36 to 78    ASSESSMENT / PLAN:    1. S/P AAA repair  Status post AAA repair in 2005 continue statin therapy, Zetia and beta-blocker  Heart rate is on the upper limit of normal and she will follow-up and I offered to increase her beta-blocker dose but the patient reported no symptoms and subsequently was not interested in medication changes. She will follow-up with her PCP in the next few weeks and with cardiology in 6 months with home blood pressure and heart rate record to see if beta-blocker dose need to be uptitrated    2. Essential hypertension  -Blood pressure is stable but heart rate in the upper limit of normal and he will follow-up with home vital signs record to see if lisinopril dose need to be decreased if blood pressure is stable and beta-blocker to be uptitrated if heart rate is high    3. Paroxysmal atrial fibrillation (HCC)  Continue on Eliquis and beta-blocker    4. Chronic heart failure with preserved ejection fraction (HCC)  Currently in NYHA class I  Follow low-salt diet    5. Mixed simple and mucopurulent chronic bronchitis (HCC)  Continue on statin and Zetia  6. Spinal stenosis of cervical region  Follow-up with your PCP  7. Spinal stenosis of lumbar region with neurogenic claudication  Follow-up with your PCP      Follow in Return in about 6 months (around 5/14/2023). Thank you for allowing me to participate in your patient's care. Please feel free to contact me if you have any questions or concerns.     Aditya Payne MD  [facility]) Cardiology

## 2022-11-25 ENCOUNTER — OFFICE VISIT (OUTPATIENT)
Dept: FAMILY MEDICINE CLINIC | Age: 87
End: 2022-11-25
Payer: MEDICARE

## 2022-11-25 VITALS
WEIGHT: 207 LBS | HEIGHT: 67 IN | RESPIRATION RATE: 18 BRPM | DIASTOLIC BLOOD PRESSURE: 82 MMHG | TEMPERATURE: 96.6 F | BODY MASS INDEX: 32.49 KG/M2 | OXYGEN SATURATION: 95 % | HEART RATE: 108 BPM | SYSTOLIC BLOOD PRESSURE: 120 MMHG

## 2022-11-25 DIAGNOSIS — R05.9 COUGH, UNSPECIFIED TYPE: ICD-10-CM

## 2022-11-25 DIAGNOSIS — R53.1 WEAK: ICD-10-CM

## 2022-11-25 DIAGNOSIS — U07.1 COVID-19: Primary | ICD-10-CM

## 2022-11-25 LAB
INFLUENZA A ANTIGEN, POC: NORMAL
INFLUENZA B ANTIGEN, POC: NORMAL
Lab: ABNORMAL
PERFORMING INSTRUMENT: ABNORMAL
QC PASS/FAIL: ABNORMAL
SARS-COV-2, POC: DETECTED

## 2022-11-25 PROCEDURE — G8400 PT W/DXA NO RESULTS DOC: HCPCS | Performed by: NURSE PRACTITIONER

## 2022-11-25 PROCEDURE — G8484 FLU IMMUNIZE NO ADMIN: HCPCS | Performed by: NURSE PRACTITIONER

## 2022-11-25 PROCEDURE — 1036F TOBACCO NON-USER: CPT | Performed by: NURSE PRACTITIONER

## 2022-11-25 PROCEDURE — 3074F SYST BP LT 130 MM HG: CPT | Performed by: NURSE PRACTITIONER

## 2022-11-25 PROCEDURE — 87426 SARSCOV CORONAVIRUS AG IA: CPT | Performed by: NURSE PRACTITIONER

## 2022-11-25 PROCEDURE — 87804 INFLUENZA ASSAY W/OPTIC: CPT | Performed by: NURSE PRACTITIONER

## 2022-11-25 PROCEDURE — 99213 OFFICE O/P EST LOW 20 MIN: CPT | Performed by: NURSE PRACTITIONER

## 2022-11-25 PROCEDURE — G8427 DOCREV CUR MEDS BY ELIG CLIN: HCPCS | Performed by: NURSE PRACTITIONER

## 2022-11-25 PROCEDURE — 1123F ACP DISCUSS/DSCN MKR DOCD: CPT | Performed by: NURSE PRACTITIONER

## 2022-11-25 PROCEDURE — G8417 CALC BMI ABV UP PARAM F/U: HCPCS | Performed by: NURSE PRACTITIONER

## 2022-11-25 PROCEDURE — 3078F DIAST BP <80 MM HG: CPT | Performed by: NURSE PRACTITIONER

## 2022-11-25 PROCEDURE — 1090F PRES/ABSN URINE INCON ASSESS: CPT | Performed by: NURSE PRACTITIONER

## 2022-11-25 RX ORDER — GUAIFENESIN DEXTROMETHORPHAN HYDROBROMIDE ORAL SOLUTION 10; 100 MG/5ML; MG/5ML
10 SOLUTION ORAL EVERY 4 HOURS PRN
Qty: 240 ML | Refills: 0 | Status: SHIPPED | OUTPATIENT
Start: 2022-11-25

## 2022-11-25 NOTE — PROGRESS NOTES
22  Christa Rodney : 1935 Sex: female  Age 82 y.o. Subjective:  Chief Complaint   Patient presents with    Cough     Non productive. Started wednesday    Fatigue     Skin feels different       HPI:   Christa Rodney , 82 y.o. female presents to the clinic for evaluation of cough x 2 days. The patient also reports feverish, sinus drainage, and fatigue. The patient has not taken any treatment for symptoms. The patient reports improving symptoms over time. The patient denies known ill exposure. The patient denies hx of COVID-19. The patient denies acute loss of taste and smell, headache, sore throat, rash, and fever. The patient also denies chest pain, abdominal pain, shortness of breath, wheezing, and nausea / vomiting / diarrhea. ROS:   Unless otherwise stated in this report the patient's positive and negative responses for review of systems for constitutional, eyes, ENT, cardiovascular, respiratory, gastrointestinal, neurological, , musculoskeletal, and integument systems and related systems to the presenting problem are either stated in the history of present illness or were not pertinent or were negative for the symptoms and/or complaints related to the presenting medical problem. Positives and pertinent negatives as per HPI. All others reviewed and are negative.       PMH:     Past Medical History:   Diagnosis Date    AAA (abdominal aortic aneurysm)     Anticoagulant long-term use     Atrial fibrillation (HCC)     Carotid artery stenosis     Hepatitis A     Hyperlipidemia     Hypertension     Incisional hernia     Spinal stenosis     Thoracic back pain     Thoracic back pain/Scoliosis    Thoracic radiculopathy     Referred to [facility]       Past Surgical History:   Procedure Laterality Date    ABDOMINAL AORTIC ANEURYSM REPAIR      [facility]    APPENDECTOMY      CARDIOVERSION  10/28/2014    OTHER SURGICAL HISTORY      epidural injections in spine    SPINE SURGERY  10/2014    TONSILLECTOMY Family History   Problem Relation Age of Onset    Stroke Mother     Heart Disease Father     Heart Disease Brother        Medications:     Current Outpatient Medications:     dextromethorphan-guaiFENesin (ROBITUSSIN-DM)  MG/5ML syrup, Take 10 mLs by mouth every 4 hours as needed for Cough, Disp: 240 mL, Rfl: 0    lisinopril (PRINIVIL;ZESTRIL) 40 MG tablet, take 1 tablet by mouth once daily, Disp: 90 tablet, Rfl: 1    celecoxib (CELEBREX) 200 MG capsule, Take 1 capsule by mouth daily, Disp: 90 capsule, Rfl: 1    allopurinol (ZYLOPRIM) 100 MG tablet, Take 4 tablets by mouth every day., Disp: 120 tablet, Rfl: 5    apixaban (ELIQUIS) 5 MG TABS tablet, TAKE 1 TABLET BY MOUTH TWICE A DAY, Disp: 60 tablet, Rfl: 5    DULoxetine (CYMBALTA) 30 MG extended release capsule, TAKE 1 CAPSULE BY MOUTH EVERY DAY, Disp: 90 capsule, Rfl: 1    ezetimibe (ZETIA) 10 MG tablet, take 1 tablet by mouth once daily, Disp: 90 tablet, Rfl: 1    simvastatin (ZOCOR) 40 MG tablet, Take 1 tablet by mouth nightly, Disp: 90 tablet, Rfl: 1    bumetanide (BUMEX) 2 MG tablet, Take 1 tablet by mouth in the morning., Disp: 90 tablet, Rfl: 1    colchicine (COLCRYS) 0.6 MG tablet, take 1 tablet by mouth once daily, Disp: 120 tablet, Rfl: 2    metoprolol tartrate (LOPRESSOR) 50 MG tablet, Take 1.5 tablets by mouth 2 times daily, Disp: 270 tablet, Rfl: 3    fluticasone (FLONASE) 50 MCG/ACT nasal spray, 1 spray by Nasal route as needed for Rhinitis or Allergies, Disp: 1 Bottle, Rfl: 5    Cholecalciferol (VITAMIN D3) 2000 units CAPS, Take by mouth daily, Disp: , Rfl:     Multiple Vitamins-Minerals (THERAPEUTIC MULTIVITAMIN-MINERALS) tablet, Take 1 tablet by mouth daily. , Disp: , Rfl:     Calcium Carb-Cholecalciferol (CALCIUM + D3) 600-200 MG-UNIT TABS, Take 1 tablet by mouth daily. , Disp: , Rfl:     Allergies:      Allergies   Allergen Reactions    Amoxicillin-Pot Clavulanate Diarrhea    Crestor [Rosuvastatin] Other (See Comments)     Muscle fatigue Lipitor [Atorvastatin] Other (See Comments)     Muscle fatigue    Sulfa Antibiotics Hives       Social History:     Social History     Tobacco Use    Smoking status: Former     Packs/day: 1.00     Years: 15.00     Pack years: 15.00     Types: Cigarettes     Quit date: 1990     Years since quittin.0    Smokeless tobacco: Never   Vaping Use    Vaping Use: Never used   Substance Use Topics    Alcohol use: Yes     Comment: rarely    Drug use: No       Physical Exam:     Vitals:    22 1133   BP: 120/82   Pulse: (!) 108   Resp: 18   Temp: (!) 96.6 °F (35.9 °C)   SpO2: 95%   Weight: 207 lb (93.9 kg)   Height: 5' 7\" (1.702 m)       Physical Exam (PE)    Physical Exam  Constitutional:       Appearance: Normal appearance. HENT:      Head: Normocephalic. Right Ear: Tympanic membrane, ear canal and external ear normal.      Left Ear: Tympanic membrane, ear canal and external ear normal.      Nose: Rhinorrhea present. Mouth/Throat:      Mouth: Mucous membranes are moist.      Pharynx: Oropharynx is clear. Eyes:      Pupils: Pupils are equal, round, and reactive to light. Cardiovascular:      Rate and Rhythm: Normal rate and regular rhythm. Pulses: Normal pulses. Heart sounds: Normal heart sounds. Pulmonary:      Effort: Pulmonary effort is normal.      Breath sounds: Normal breath sounds. No wheezing, rhonchi or rales. Abdominal:      General: Bowel sounds are normal.      Palpations: Abdomen is soft. Musculoskeletal:         General: Normal range of motion. Cervical back: Normal range of motion and neck supple. Lymphadenopathy:      Cervical: No cervical adenopathy. Skin:     General: Skin is warm and dry. Capillary Refill: Capillary refill takes less than 2 seconds. Neurological:      General: No focal deficit present. Mental Status: She is alert and oriented to person, place, and time.    Psychiatric:         Mood and Affect: Mood normal.         Behavior: Behavior normal.        Testing:   (All laboratory and radiology results have been personally reviewed by myself)  Labs:  Results for orders placed or performed in visit on 11/25/22   POCT COVID-19, Antigen   Result Value Ref Range    SARS-COV-2, POC Detected (A) Not Detected    Lot Number [id number]     QC Pass/Fail pass     Performing Instrument BD Veritor    POCT Influenza A/B Antigen (BD Veritor)   Result Value Ref Range    Inflenza A Ag neg     Influenza B Ag neg        Imaging: All Radiology results interpreted by Radiologist unless otherwise noted. No orders to display       Assessment / Plan:   The patient's vitals, allergies, medications, and past medical history have been reviewed. Troy Denis was seen today for cough and fatigue. Diagnoses and all orders for this visit:    COVID-19    Cough, unspecified type  -     POCT COVID-19, Antigen  -     POCT Influenza A/B Antigen (BD Veritor)  -     dextromethorphan-guaiFENesin (ROBITUSSIN-DM)  MG/5ML syrup; Take 10 mLs by mouth every 4 hours as needed for Cough    Weak  -     POCT COVID-19, Antigen  -     POCT Influenza A/B Antigen (BD Veritor)      - Disposition: Home    - Educational material printed for patient's review and were included in patient instructions. After Visit Summary was given to patient at the end of visit. - Patient offered and declined COVID-19 antibody infusion today. Advised to follow CDC guidelines. Encouraged oral fluids and rest. Discussed symptomatic treatments with patient today. The patient is to schedule a follow-up with PCP in the next 2-3 days for reevaluation. Red flag symptoms were also discussed with the patient today. If symptoms worsen the patient is to go directly to the emergency department for reevaluation and treatment. Pt verbalizes understanding and is in agreement with plan of care. All questions answered. SIGNATURE: MAMTA Massey-CNP    *NOTE: This report was transcribed using voice recognition software. Every effort was made to ensure accuracy; however, inadvertent computerized transcription errors may be present.

## 2022-11-29 ENCOUNTER — TELEPHONE (OUTPATIENT)
Dept: FAMILY MEDICINE CLINIC | Age: 87
End: 2022-11-29

## 2022-11-29 DIAGNOSIS — U07.1 COVID-19: Primary | ICD-10-CM

## 2022-11-29 NOTE — TELEPHONE ENCOUNTER
Patient has been contacted by [facility] and is scheduled for 1:30. If she worsens she will be re-evaluated.

## 2022-11-29 NOTE — TELEPHONE ENCOUNTER
Patient was seen on 11/25/22 by Thony Carter and Dx w/ Covid Symptoms started 11/23/22. She was told to call the office on Monday if she was not improving and wanted to have infusion. Patient would like to have monoclonal AB infusion at [facility]. Can you order? Chief complaint is a persistent cough. Describes cough as deep and rolls around chest. Has used Robitussin DM but it is not helping.      Please advise

## 2022-12-16 ENCOUNTER — OFFICE VISIT (OUTPATIENT)
Dept: FAMILY MEDICINE CLINIC | Age: 87
End: 2022-12-16

## 2022-12-16 VITALS
BODY MASS INDEX: 31.95 KG/M2 | DIASTOLIC BLOOD PRESSURE: 80 MMHG | WEIGHT: 204 LBS | TEMPERATURE: 97.8 F | OXYGEN SATURATION: 94 % | HEART RATE: 97 BPM | SYSTOLIC BLOOD PRESSURE: 150 MMHG

## 2022-12-16 DIAGNOSIS — R06.89 ABNORMAL BREATH SOUNDS: ICD-10-CM

## 2022-12-16 DIAGNOSIS — R05.9 COUGH, UNSPECIFIED TYPE: Primary | ICD-10-CM

## 2022-12-16 RX ORDER — METHYLPREDNISOLONE 4 MG/1
4 TABLET ORAL SEE ADMIN INSTRUCTIONS
Qty: 1 KIT | Refills: 0 | Status: SHIPPED | OUTPATIENT
Start: 2022-12-16

## 2022-12-16 RX ORDER — ALBUTEROL SULFATE 90 UG/1
2 AEROSOL, METERED RESPIRATORY (INHALATION) 4 TIMES DAILY PRN
Qty: 18 G | Refills: 0 | Status: SHIPPED | OUTPATIENT
Start: 2022-12-16

## 2022-12-16 ASSESSMENT — ENCOUNTER SYMPTOMS
COUGH: 1
SHORTNESS OF BREATH: 0
COLOR CHANGE: 0
CONSTIPATION: 0
BLOOD IN STOOL: 0
ABDOMINAL DISTENTION: 0
APNEA: 0
RHINORRHEA: 0
SINUS PRESSURE: 0
DIARRHEA: 0
VOMITING: 0
FACIAL SWELLING: 0
SINUS PAIN: 0
CHEST TIGHTNESS: 0
PHOTOPHOBIA: 0

## 2022-12-16 NOTE — PROGRESS NOTES
Jayson uGo is a 82 y.o. female accompanied by herself     CC:   Chief Complaint   Patient presents with    Cough     Lingering cough since having COVID on month ago       Cough:  Patient is here with complaints of a cough. This has been going on for 1 month. The cough is  productive. She does not have hemoptysis. Associated symptoms include none. Exacerbating factors include none. Cough is not associated with lying down flat. Patient does not have a history of acid reflux. Alleviating factors include none. Patient does not have shortness of breath and/or wheezing. She is not a smoker. Patient does not have a history of chronic lung disease and/or environmental exposures. Recent travel includes none. She does not have risk factors for tuberculosis. Patient does not have weight loss, fevers or chills. New medications include Robitussin-DM, this has not helped. Of note: The patient did have COVID in the last month. She got over the acute infection fairly well. However since then she has had this persistent cough which is difficult for her. Patient's past medical, surgical, social and/or family history reviewed, updated in chart, and are non-contributory (unless otherwise stated). Medications and allergies also reviewed and updated in chart. BP (!) 150/80   Pulse 97   Temp 97.8 °F (36.6 °C)   Wt 204 lb (92.5 kg)   SpO2 94%   BMI 31.95 kg/m²     Review of Systems   Constitutional:  Negative for chills, fatigue and fever. HENT:  Negative for congestion, ear pain, facial swelling, rhinorrhea, sinus pressure and sinus pain. Eyes:  Negative for photophobia and visual disturbance. Respiratory:  Positive for cough. Negative for apnea, chest tightness and shortness of breath. Cardiovascular:  Negative for chest pain and palpitations. Gastrointestinal:  Negative for abdominal distention, blood in stool, constipation, diarrhea and vomiting.    Endocrine: Negative for cold for cough. Diagnoses and all orders for this visit:    Cough, unspecified type      My clinical suspicion is that the patient just has some recovering damage after her bout of COVID. It does sound to my physical exam that there is some abnormal breath sounds. I did get a chest x-ray this was normal.  The patient and I had a discussion that there is a possibility that this may be a bronchospasm that she is having. As such I have given her a Medrol Dosepak as well as a inhaler to help treat this. I did advise the patient to stop using her cough medication as this is most likely suppressing what I would like to happen. Patient is following up with her primary care physician on Tuesday. This gives us the opportunity to see if my treatment is working. Follow Up:  No follow-ups on file. As above. Call or go to ED immediately if symptoms worsen or persist.  No follow-ups on file. , or sooner if necessary. Counseled regarding above diagnosis, including possible risks and complications,  especially if left uncontrolled. Counseledregarding the possible side effects, risks, benefits and alternatives to treatment; patient and/or guardian verbalizes understanding, agrees, feels comfortable with and wishes to proceed with above treatment plan. patient to call with any new medication issues, and read all Rx info from pharmacy to assure aware of all possible risks and side effects of medication before taking. Patient and/or guardian verbalizes understanding and agrees with above counseling,assessment and plan. All questions answered.

## 2022-12-20 ENCOUNTER — OFFICE VISIT (OUTPATIENT)
Dept: FAMILY MEDICINE CLINIC | Age: 87
End: 2022-12-20
Payer: MEDICARE

## 2022-12-20 VITALS
SYSTOLIC BLOOD PRESSURE: 130 MMHG | HEART RATE: 69 BPM | OXYGEN SATURATION: 97 % | DIASTOLIC BLOOD PRESSURE: 60 MMHG | BODY MASS INDEX: 31.32 KG/M2 | TEMPERATURE: 97.3 F | WEIGHT: 200 LBS

## 2022-12-20 DIAGNOSIS — E11.9 TYPE 2 DIABETES MELLITUS WITHOUT COMPLICATION, WITHOUT LONG-TERM CURRENT USE OF INSULIN (HCC): ICD-10-CM

## 2022-12-20 DIAGNOSIS — I10 ESSENTIAL HYPERTENSION: ICD-10-CM

## 2022-12-20 DIAGNOSIS — I48.0 PAROXYSMAL ATRIAL FIBRILLATION (HCC): ICD-10-CM

## 2022-12-20 DIAGNOSIS — R05.2 SUBACUTE COUGH: ICD-10-CM

## 2022-12-20 DIAGNOSIS — N18.31 TYPE 2 DIABETES MELLITUS WITH STAGE 3A CHRONIC KIDNEY DISEASE, WITHOUT LONG-TERM CURRENT USE OF INSULIN (HCC): ICD-10-CM

## 2022-12-20 DIAGNOSIS — I50.42 CHRONIC COMBINED SYSTOLIC AND DIASTOLIC HEART FAILURE (HCC): Primary | ICD-10-CM

## 2022-12-20 DIAGNOSIS — I50.42 CHRONIC COMBINED SYSTOLIC AND DIASTOLIC HEART FAILURE (HCC): ICD-10-CM

## 2022-12-20 DIAGNOSIS — E11.22 TYPE 2 DIABETES MELLITUS WITH STAGE 3A CHRONIC KIDNEY DISEASE, WITHOUT LONG-TERM CURRENT USE OF INSULIN (HCC): ICD-10-CM

## 2022-12-20 LAB
ALBUMIN SERPL-MCNC: 4.2 G/DL (ref 3.5–5.2)
ALP BLD-CCNC: 72 U/L (ref 35–104)
ALT SERPL-CCNC: 27 U/L (ref 0–32)
ANION GAP SERPL CALCULATED.3IONS-SCNC: 16 MMOL/L (ref 7–16)
AST SERPL-CCNC: 24 U/L (ref 0–31)
BILIRUB SERPL-MCNC: 0.4 MG/DL (ref 0–1.2)
BUN BLDV-MCNC: 40 MG/DL (ref 6–23)
CALCIUM SERPL-MCNC: 10.4 MG/DL (ref 8.6–10.2)
CHLORIDE BLD-SCNC: 99 MMOL/L (ref 98–107)
CO2: 26 MMOL/L (ref 22–29)
CREAT SERPL-MCNC: 1.3 MG/DL (ref 0.5–1)
GFR SERPL CREATININE-BSD FRML MDRD: 40 ML/MIN/1.73
GLUCOSE BLD-MCNC: 88 MG/DL (ref 74–99)
HBA1C MFR BLD: 6.8 %
POTASSIUM SERPL-SCNC: 4.5 MMOL/L (ref 3.5–5)
PRO-BNP: 4024 PG/ML (ref 0–450)
SODIUM BLD-SCNC: 141 MMOL/L (ref 132–146)
TOTAL PROTEIN: 7.9 G/DL (ref 6.4–8.3)
TSH SERPL DL<=0.05 MIU/L-ACNC: 1.98 UIU/ML (ref 0.27–4.2)

## 2022-12-20 PROCEDURE — 1090F PRES/ABSN URINE INCON ASSESS: CPT | Performed by: INTERNAL MEDICINE

## 2022-12-20 PROCEDURE — 3044F HG A1C LEVEL LT 7.0%: CPT | Performed by: INTERNAL MEDICINE

## 2022-12-20 PROCEDURE — 83036 HEMOGLOBIN GLYCOSYLATED A1C: CPT | Performed by: INTERNAL MEDICINE

## 2022-12-20 PROCEDURE — 1123F ACP DISCUSS/DSCN MKR DOCD: CPT | Performed by: INTERNAL MEDICINE

## 2022-12-20 PROCEDURE — 99214 OFFICE O/P EST MOD 30 MIN: CPT | Performed by: INTERNAL MEDICINE

## 2022-12-20 PROCEDURE — G8417 CALC BMI ABV UP PARAM F/U: HCPCS | Performed by: INTERNAL MEDICINE

## 2022-12-20 PROCEDURE — G8427 DOCREV CUR MEDS BY ELIG CLIN: HCPCS | Performed by: INTERNAL MEDICINE

## 2022-12-20 PROCEDURE — 1036F TOBACCO NON-USER: CPT | Performed by: INTERNAL MEDICINE

## 2022-12-20 PROCEDURE — G8400 PT W/DXA NO RESULTS DOC: HCPCS | Performed by: INTERNAL MEDICINE

## 2022-12-20 PROCEDURE — 3078F DIAST BP <80 MM HG: CPT | Performed by: INTERNAL MEDICINE

## 2022-12-20 PROCEDURE — 3074F SYST BP LT 130 MM HG: CPT | Performed by: INTERNAL MEDICINE

## 2022-12-20 PROCEDURE — G8484 FLU IMMUNIZE NO ADMIN: HCPCS | Performed by: INTERNAL MEDICINE

## 2022-12-20 ASSESSMENT — ENCOUNTER SYMPTOMS
COUGH: 1
ALLERGIC/IMMUNOLOGIC NEGATIVE: 1
EYES NEGATIVE: 1
BACK PAIN: 1

## 2022-12-20 NOTE — PROGRESS NOTES
2022    Burt Bamberger Barrett (:  1935) is a 82 y.o. female, here for evaluation of the following medical concerns: The patient had COVID back in November. She is now had a persistent cough. I did review the chest x-ray. She had noted however that there is been some swelling of her ankles later in the day. This is somewhat new for her. She does have a history of atrial fibrillation and dilated cardiomyopathy. The patient states that she has a cough that is worse at nighttime. She denies paroxysmal nocturnal dyspnea. She has had a tremendous amount of problems with her lower back. She did have spinal stenosis surgery previously but when I asked her whether she has daily symptoms she states that yes indeed she does and that they seem to be worsening. Patient is denying any chest pain or chest pressure. She denies any bleeding or bruising and she does take Eliquis. Her hemoglobin A1c today is 6.8 which is rising slowly. She still really is not at the point where I would begin medication. Other  Associated symptoms include coughing. Back Pain    Hyperlipidemia    Hypertension        Review of Systems   Constitutional: Negative. HENT: Negative. Eyes: Negative. Respiratory:  Positive for cough. Cardiovascular:         Atrial fibrillation without evidence of rapid ventricular response. Questionable recurrence of congestive heart failure. Gastrointestinal:         Denies abdominal pain. Endocrine:        Diabetes with a hemoglobin A1c of 6.8   Genitourinary: Negative. Musculoskeletal:  Positive for back pain. Thoracic and lumbar spine pain. Worsening lumbar stenosis symptomatology. Allergic/Immunologic: Negative. Neurological: Negative. Hematological: Negative. Psychiatric/Behavioral:          Improved depression with the use of Cymbalta.    Problem List: Malaise and fatigue, Essential hypertension, Hyperlipidemia  Health Maintenance:  Colonoscopy - (2014)  Bone Density Test Screening - (2009)  Couseled on Home Safety - (10/12/2015)  Influenza Vaccination - (2020)  Tdap - (2017) DISCUSSED  Zoster/Shingles Vaccine - (2017) DISCUSSED  Shingrix Vaccine (Shingles) - (2018) SLIP GIVEN  Medical Problems:  Hypertension, Hyperlipidemia  Atrial Fibrillation - (10/2014) POST SURGERY-[facility]  Abdominal Aortic Aneurysm - CT   Thoracic Back Pain/Scoliosis  Thoracic Radiculopathy - REFERRED TO [facility]  Chronic RLL Infiltate - [facility]  Lumbar Spinal Stenosis - [facility] 2/10 improved with therapy- REFERRED TO [facility] 13-recurrent- failed PT-MRI ordered 3/16/2021-worsening symptomatology 2022. Referral back to Dr. Abelino Brooks. Incisional Hernia - (2012) CAUSING PAIN  Carotid Artery Stenosis - ()  Depression- improved with Cymbalta 2021  CHF p EF 2021  Surgical Hx:  AAA - LAST CT , ORDERED 11/10-normal CT   Tonsillectomy  Appendectomy - 2011 GANGRENOUS  Lumbar Surgery - (2014) [facility]K  OB/Gyn Hx:: (4)Parity: Full term (3), one miscarriage  Reviewed, no changes. FH:  Father:  MI -  age 67. Mother:  Cerebrovascular Accident (CVA) -  age 80. Reviewed, no changes. SH:  Marital: . Personal Habits: Alcohol: Occasionally consumes wine. Exercise Type: Does housework daily. Reviewed, no changes. Date: 2022  Was the patient queried about smoking behavior? Yes   Does the patient currently smoke? Smoking: Patient is a former smoker. Prior to Visit Medications    Medication Sig Taking? Authorizing Provider   methylPREDNISolone (MEDROL, HYACINTH,) 4 MG tablet Take 1 tablet by mouth See Admin Instructions Yes Mallorie Norton MD   lisinopril (PRINIVIL;ZESTRIL) 40 MG tablet take 1 tablet by mouth once daily Yes Prudencio Peck MD   allopurinol (ZYLOPRIM) 100 MG tablet Take 4 tablets by mouth every day.  Yes Prudencio Peck MD   apixaban (ELIQUIS) 5 MG TABS tablet TAKE 1 TABLET BY MOUTH TWICE A DAY Yes Kiki Drake MD   DULoxetine (CYMBALTA) 30 MG extended release capsule TAKE 1 CAPSULE BY MOUTH EVERY DAY Yes Kiki Drake MD   ezetimibe (ZETIA) 10 MG tablet take 1 tablet by mouth once daily Yes Kiki Drake MD   simvastatin (ZOCOR) 40 MG tablet Take 1 tablet by mouth nightly Yes Kiki Drake MD   bumetanide (BUMEX) 2 MG tablet Take 1 tablet by mouth in the morning. Yes Kiki Drake MD   metoprolol tartrate (LOPRESSOR) 50 MG tablet Take 1.5 tablets by mouth 2 times daily Yes Kiki Drake MD   albuterol sulfate HFA (VENTOLIN HFA) 108 (90 Base) MCG/ACT inhaler Inhale 2 puffs into the lungs 4 times daily as needed for Wheezing  Eleni Weber MD   dextromethorphan-guaiFENesin (ROBITUSSIN-DM)  MG/5ML syrup Take 10 mLs by mouth every 4 hours as needed for Cough  Wade Sitter., APRN - CNP   celecoxib (CELEBREX) 200 MG capsule Take 1 capsule by mouth daily  Kiki Drake MD   colchicine (COLCRYS) 0.6 MG tablet take 1 tablet by mouth once daily  Kiki Drake MD   fluticasone (FLONASE) 50 MCG/ACT nasal spray 1 spray by Nasal route as needed for Rhinitis or Allergies  Kiki Drake MD   Cholecalciferol (VITAMIN D3) 2000 units CAPS Take by mouth daily  Historical Provider, MD   Multiple Vitamins-Minerals (THERAPEUTIC MULTIVITAMIN-MINERALS) tablet Take 1 tablet by mouth daily. Historical Provider, MD   Calcium Carb-Cholecalciferol (CALCIUM + D3) 600-200 MG-UNIT TABS Take 1 tablet by mouth daily.   Historical Provider, MD        Allergies   Allergen Reactions    Amoxicillin-Pot Clavulanate Diarrhea    Crestor [Rosuvastatin] Other (See Comments)     Muscle fatigue    Lipitor [Atorvastatin] Other (See Comments)     Muscle fatigue    Sulfa Antibiotics Hives       Past Medical History:   Diagnosis Date    AAA (abdominal aortic aneurysm)     Anticoagulant long-term use     Atrial fibrillation (HCC)     Carotid artery stenosis     Hepatitis A     Hyperlipidemia     Hypertension     Incisional hernia Spinal stenosis     Thoracic back pain     Thoracic back pain/Scoliosis    Thoracic radiculopathy     Referred to [facility]       Past Surgical History:   Procedure Laterality Date    ABDOMINAL AORTIC ANEURYSM REPAIR  2005    [facility]    APPENDECTOMY      CARDIOVERSION  10/28/2014    OTHER SURGICAL HISTORY      epidural injections in spine    SPINE SURGERY  10/2014    TONSILLECTOMY         Social History     Socioeconomic History    Marital status:      Spouse name: Not on file    Number of children: Not on file    Years of education: Not on file    Highest education level: Not on file   Occupational History    Not on file   Tobacco Use    Smoking status: Former     Packs/day: 1.00     Years: 15.00     Pack years: 15.00     Types: Cigarettes     Quit date: 1990     Years since quittin.1    Smokeless tobacco: Never   Vaping Use    Vaping Use: Never used   Substance and Sexual Activity    Alcohol use: Yes     Comment: rarely    Drug use: No    Sexual activity: Not on file   Other Topics Concern    Not on file   Social History Narrative    Not on file     Social Determinants of Health     Financial Resource Strain: Low Risk     Difficulty of Paying Living Expenses: Not hard at all   Food Insecurity: No Food Insecurity    Worried About [address] [employer] in the Last Year: Never true    [facility] in the Last Year: Never true   Transportation Needs: Not on file   Physical Activity: Insufficiently Active    Days of Exercise per Week: 7 days    Minutes of Exercise per Session: 20 min   Stress: Not on file   Social Connections: Not on file   Intimate Partner Violence: Not on file   Housing Stability: Not on file        Family History   Problem Relation Age of Onset    Stroke Mother     Heart Disease Father     Heart Disease Brother        Vitals:    22 1331   Pulse: 69   Temp: 97.3 °F (36.3 °C)   SpO2: 97%   Weight: 200 lb (90.7 kg)       Estimated body mass index is 31.32 kg/m² as calculated from the following:    Height as of 11/25/22: 5' 7\" (1.702 m). Weight as of this encounter: 200 lb (90.7 kg). Physical Exam  Const: Appears healthy, well developed and well nourished. Appears obese. Eyes: EOMI in both eyes. PERRL. ENMT: External ears WNL. Tympanic membranes are intact. External nose WNL. Neck: Supple and symmetric. Palpation reveals no adenopathy. No masses appreciated. Thyroid: no nodule  appreciated or thyromegaly. No jugular venous distention. Resp: Respirations are unlabored. Respiration rate is normal. Auscultate good airflow. Moderate crackles right base. CV: Rhythm is irregularly irregular. S1 is normal. S2 is normal. Carotids: no bruits. Abdominal aorta: not palpable. Pedal  pulses: 2+ and equal bilaterally. Extremities: No clubbing, cyanosis . No edema today of the lower extremity. Abdomen: Bowel sounds are normoactive. Palpation reveals softness, with no distension, organomegaly or  tenderness. No abdominal masses palpable. No palpable hepatosplenomegaly. Musculo: Walks with a limping gait. Upper Extremities: ROM: Significant limitation in range of motion of the left  shoulder. Lower Extremities: ROM: Significant limitation of range of motion of the left hip. Pain with inversion and eversion. Skin: Dry and warm with no rash. Neuro: Alert and oriented x3. Mood is normal. Affect is normal. Speech is articulate and fluent. Deep tendon reflexes are absent both patellar and plantar bilaterally. No evidence of erythema, warmth, or fluctuance  Diagnoses and all orders for this visit:    Chronic combined systolic and diastolic heart failure (HCC)  -     POCT glycosylated hemoglobin (Hb A1C)  -     Comprehensive Metabolic Panel; Future  -     External Referral To Physical Medicine Rehab  -     Brain Natriuretic Peptide; Future  -     TSH; Future    Essential hypertension  -     POCT glycosylated hemoglobin (Hb A1C)  -     Comprehensive Metabolic Panel;  Future  -     External Referral To Physical Medicine Rehab  -     Brain Natriuretic Peptide; Future  -     TSH; Future    Paroxysmal atrial fibrillation (HCC)  -     POCT glycosylated hemoglobin (Hb A1C)  -     Comprehensive Metabolic Panel; Future  -     External Referral To Physical Medicine Rehab  -     Brain Natriuretic Peptide; Future    Type 2 diabetes mellitus without complication, without long-term current use of insulin (HCC)  -     POCT glycosylated hemoglobin (Hb A1C)  -     TSH; Future    Type 2 diabetes mellitus with stage 3a chronic kidney disease, without long-term current use of insulin (HCC)    Subacute cough  -     POCT glycosylated hemoglobin (Hb A1C)  -     Comprehensive Metabolic Panel; Future  -     External Referral To Physical Medicine Rehab  -     Brain Natriuretic Peptide; Future  Crackles seem worse to me than he did previously in the right base. I will get a brain natruretic peptide. Chest x-ray was not confirmatory in terms of pleural effusion. Patient be sent to Dr. Amanda Chowdhury for worsening symptoms of lumbar stenosis. Patient is counseled regarding her blood sugar control. The patient is to be seen back in 4 months to reassess. I will let her know if we need to make adjustments of her medications based on her brain natruretic peptide. I did discuss briefly with her SGLT2 inhibitors for a combination of treatment of diabetes and congestive heart failure with preserved ejection fraction.

## 2023-01-03 ENCOUNTER — OFFICE VISIT (OUTPATIENT)
Dept: FAMILY MEDICINE CLINIC | Age: 88
End: 2023-01-03
Payer: MEDICARE

## 2023-01-03 VITALS
WEIGHT: 201 LBS | BODY MASS INDEX: 31.48 KG/M2 | OXYGEN SATURATION: 99 % | TEMPERATURE: 97.5 F | SYSTOLIC BLOOD PRESSURE: 120 MMHG | DIASTOLIC BLOOD PRESSURE: 80 MMHG | HEART RATE: 75 BPM

## 2023-01-03 DIAGNOSIS — Z79.4 TYPE 2 DIABETES MELLITUS WITH STAGE 3A CHRONIC KIDNEY DISEASE, WITH LONG-TERM CURRENT USE OF INSULIN (HCC): ICD-10-CM

## 2023-01-03 DIAGNOSIS — N18.31 TYPE 2 DIABETES MELLITUS WITH STAGE 3A CHRONIC KIDNEY DISEASE, WITH LONG-TERM CURRENT USE OF INSULIN (HCC): ICD-10-CM

## 2023-01-03 DIAGNOSIS — E11.22 TYPE 2 DIABETES MELLITUS WITH STAGE 3A CHRONIC KIDNEY DISEASE, WITH LONG-TERM CURRENT USE OF INSULIN (HCC): ICD-10-CM

## 2023-01-03 DIAGNOSIS — I10 ESSENTIAL HYPERTENSION: ICD-10-CM

## 2023-01-03 DIAGNOSIS — I50.42 CHRONIC COMBINED SYSTOLIC AND DIASTOLIC HEART FAILURE (HCC): ICD-10-CM

## 2023-01-03 DIAGNOSIS — I48.0 PAROXYSMAL ATRIAL FIBRILLATION (HCC): ICD-10-CM

## 2023-01-03 DIAGNOSIS — I50.42 CHRONIC COMBINED SYSTOLIC AND DIASTOLIC HEART FAILURE (HCC): Primary | ICD-10-CM

## 2023-01-03 LAB
ALBUMIN SERPL-MCNC: 4.2 G/DL (ref 3.5–5.2)
ALP BLD-CCNC: 59 U/L (ref 35–104)
ALT SERPL-CCNC: 12 U/L (ref 0–32)
ANION GAP SERPL CALCULATED.3IONS-SCNC: 13 MMOL/L (ref 7–16)
AST SERPL-CCNC: 19 U/L (ref 0–31)
BILIRUB SERPL-MCNC: 0.7 MG/DL (ref 0–1.2)
BUN BLDV-MCNC: 26 MG/DL (ref 6–23)
CALCIUM SERPL-MCNC: 10.4 MG/DL (ref 8.6–10.2)
CHLORIDE BLD-SCNC: 102 MMOL/L (ref 98–107)
CO2: 26 MMOL/L (ref 22–29)
CREAT SERPL-MCNC: 1.1 MG/DL (ref 0.5–1)
GFR SERPL CREATININE-BSD FRML MDRD: 49 ML/MIN/1.73
GLUCOSE BLD-MCNC: 119 MG/DL (ref 74–99)
MAGNESIUM: 2.1 MG/DL (ref 1.6–2.6)
POTASSIUM SERPL-SCNC: 4.4 MMOL/L (ref 3.5–5)
PRO-BNP: 2622 PG/ML (ref 0–450)
SODIUM BLD-SCNC: 141 MMOL/L (ref 132–146)
TOTAL PROTEIN: 7.5 G/DL (ref 6.4–8.3)

## 2023-01-03 PROCEDURE — 99214 OFFICE O/P EST MOD 30 MIN: CPT | Performed by: INTERNAL MEDICINE

## 2023-01-03 PROCEDURE — G8417 CALC BMI ABV UP PARAM F/U: HCPCS | Performed by: INTERNAL MEDICINE

## 2023-01-03 PROCEDURE — 1090F PRES/ABSN URINE INCON ASSESS: CPT | Performed by: INTERNAL MEDICINE

## 2023-01-03 PROCEDURE — G8484 FLU IMMUNIZE NO ADMIN: HCPCS | Performed by: INTERNAL MEDICINE

## 2023-01-03 PROCEDURE — 1036F TOBACCO NON-USER: CPT | Performed by: INTERNAL MEDICINE

## 2023-01-03 PROCEDURE — 3074F SYST BP LT 130 MM HG: CPT | Performed by: INTERNAL MEDICINE

## 2023-01-03 PROCEDURE — G8400 PT W/DXA NO RESULTS DOC: HCPCS | Performed by: INTERNAL MEDICINE

## 2023-01-03 PROCEDURE — 1123F ACP DISCUSS/DSCN MKR DOCD: CPT | Performed by: INTERNAL MEDICINE

## 2023-01-03 PROCEDURE — G8427 DOCREV CUR MEDS BY ELIG CLIN: HCPCS | Performed by: INTERNAL MEDICINE

## 2023-01-03 PROCEDURE — 3079F DIAST BP 80-89 MM HG: CPT | Performed by: INTERNAL MEDICINE

## 2023-01-03 ASSESSMENT — ENCOUNTER SYMPTOMS
EYES NEGATIVE: 1
BACK PAIN: 1
ALLERGIC/IMMUNOLOGIC NEGATIVE: 1
COUGH: 1

## 2023-01-03 ASSESSMENT — PATIENT HEALTH QUESTIONNAIRE - PHQ9
SUM OF ALL RESPONSES TO PHQ QUESTIONS 1-9: 3
3. TROUBLE FALLING OR STAYING ASLEEP: 0
10. IF YOU CHECKED OFF ANY PROBLEMS, HOW DIFFICULT HAVE THESE PROBLEMS MADE IT FOR YOU TO DO YOUR WORK, TAKE CARE OF THINGS AT HOME, OR GET ALONG WITH OTHER PEOPLE: 0
6. FEELING BAD ABOUT YOURSELF - OR THAT YOU ARE A FAILURE OR HAVE LET YOURSELF OR YOUR FAMILY DOWN: 0
SUM OF ALL RESPONSES TO PHQ9 QUESTIONS 1 & 2: 1
4. FEELING TIRED OR HAVING LITTLE ENERGY: 1
SUM OF ALL RESPONSES TO PHQ QUESTIONS 1-9: 3
7. TROUBLE CONCENTRATING ON THINGS, SUCH AS READING THE NEWSPAPER OR WATCHING TELEVISION: 0
SUM OF ALL RESPONSES TO PHQ QUESTIONS 1-9: 3
8. MOVING OR SPEAKING SO SLOWLY THAT OTHER PEOPLE COULD HAVE NOTICED. OR THE OPPOSITE, BEING SO FIGETY OR RESTLESS THAT YOU HAVE BEEN MOVING AROUND A LOT MORE THAN USUAL: 0
SUM OF ALL RESPONSES TO PHQ QUESTIONS 1-9: 3
9. THOUGHTS THAT YOU WOULD BE BETTER OFF DEAD, OR OF HURTING YOURSELF: 0
5. POOR APPETITE OR OVEREATING: 1
2. FEELING DOWN, DEPRESSED OR HOPELESS: 1
1. LITTLE INTEREST OR PLEASURE IN DOING THINGS: 0

## 2023-01-03 NOTE — PROGRESS NOTES
1/3/2023    Lizbeth Hernandez (:  1935) is a 82 y.o. female, here for evaluation of the following medical concerns:    Patient's brain natruretic peptide was quite elevated. She did have some symptoms of cough and some shortness of breath which did improve with the increased dose of Bumex. She denies orthopnea or PND. She states she is able to sleep through the night. She has not lost any weight which is of concern but she states that she was eating quite a bit over the holiday. For peripheral lymphedema has improved. Her cough has improved. Other  Associated symptoms include coughing. Back Pain    Hyperlipidemia    Hypertension        Review of Systems   Constitutional: Negative. HENT: Negative. Eyes: Negative. Respiratory:  Positive for cough. Cardiovascular:         Atrial fibrillation without evidence of rapid ventricular response. Improved symptomatology with increased Bumex dosing. Gastrointestinal:         Denies abdominal pain. Endocrine:        Diabetes with a hemoglobin A1c of 6.8   Genitourinary: Negative. Musculoskeletal:  Positive for back pain. Thoracic and lumbar spine pain   Allergic/Immunologic: Negative. Neurological: Negative. Hematological: Negative. Psychiatric/Behavioral:          Improved depression with the use of Cymbalta.    Problem List: Malaise and fatigue, Essential hypertension, Hyperlipidemia  Health Maintenance:  Colonoscopy - (2014)  Bone Density Test Screening - (2009)  Couseled on Home Safety - (10/12/2015)  Influenza Vaccination - (2020)  Tdap - (2017) DISCUSSED  Zoster/Shingles Vaccine - (2017) DISCUSSED  Shingrix Vaccine (Shingles) - (2018) SLIP GIVEN  Medical Problems:  Hypertension, Hyperlipidemia  Atrial Fibrillation - (10/2014) POST SURGERY-[facility]  Abdominal Aortic Aneurysm - CT   Thoracic Back Pain/Scoliosis  Thoracic Radiculopathy - REFERRED TO [facility] - [facility]  Lumbar Spinal Stenosis - [facility]AP[facility] 2/10 improved with therapy- REFERRED TO [facility] 13-recurrent- failed PT-MRI ordered 3/16/2021-worsening symptomatology 2022. Referral back to Dr. Emilee Mcdaniel. Incisional Hernia - (2012) CAUSING PAIN  Carotid Artery Stenosis - ()  Depression- improved with Cymbalta 2021  CHF p EF 2021  Surgical Hx:  AAA - LAST CT , ORDERED 11/10-normal CT   Tonsillectomy  Appendectomy - 2011 GANGRENOUS  Lumbar Surgery - (2014) ERIK  OB/Gyn Hx:: (4)Parity: Full term (3), one miscarriage  Reviewed, no changes. FH:  Father:  MI -  age 67. Mother:  Cerebrovascular Accident (CVA) -  age 80. Reviewed, no changes. SH:  Marital: . Personal Habits: Alcohol: Occasionally consumes wine. Exercise Type: Does housework daily. Reviewed, no changes. Date: 2022  Was the patient queried about smoking behavior? Yes   Does the patient currently smoke? Smoking: Patient is a former smoker. Prior to Visit Medications    Medication Sig Taking? Authorizing Provider   lisinopril (PRINIVIL;ZESTRIL) 40 MG tablet take 1 tablet by mouth once daily Yes Nataly Cruz MD   celecoxib (CELEBREX) 200 MG capsule Take 1 capsule by mouth daily Yes Nataly Cruz MD   allopurinol (ZYLOPRIM) 100 MG tablet Take 4 tablets by mouth every day. Yes Nataly Cruz MD   apixaban (ELIQUIS) 5 MG TABS tablet TAKE 1 TABLET BY MOUTH TWICE A DAY Yes Nataly Cruz MD   DULoxetine (CYMBALTA) 30 MG extended release capsule TAKE 1 CAPSULE BY MOUTH EVERY DAY Yes Nataly Cruz MD   ezetimibe (ZETIA) 10 MG tablet take 1 tablet by mouth once daily Yes Nataly Cruz MD   simvastatin (ZOCOR) 40 MG tablet Take 1 tablet by mouth nightly Yes Nataly Cruz MD   bumetanide (BUMEX) 2 MG tablet Take 1 tablet by mouth in the morning.   Patient taking differently: Take 2 mg by mouth daily 1 QOD alt 2 QOD Yes Nataly Cruz MD   colchicine (COLCRYS) 0.6 MG tablet take 1 tablet by mouth once daily Yes Isiah Mtz MD   metoprolol tartrate (LOPRESSOR) 50 MG tablet Take 1.5 tablets by mouth 2 times daily Yes Isiah Mtz MD   albuterol sulfate HFA (VENTOLIN HFA) 108 (90 Base) MCG/ACT inhaler Inhale 2 puffs into the lungs 4 times daily as needed for Wheezing  Sobia Lora MD   methylPREDNISolone (MEDROL, HYACINTH,) 4 MG tablet Take 1 tablet by mouth See Admin Instructions  Patient not taking: Reported on 1/3/2023  Sobia Lora MD   dextromethorphan-guaiFENesin (ROBITUSSIN-DM)  MG/5ML syrup Take 10 mLs by mouth every 4 hours as needed for Cough  Patient not taking: Reported on 1/3/2023  Brando Shabazz, APRN - CNP   fluticasone (FLONASE) 50 MCG/ACT nasal spray 1 spray by Nasal route as needed for Rhinitis or Allergies  Isiah Mtz MD   Cholecalciferol (VITAMIN D3) 2000 units CAPS Take by mouth daily  Historical Provider, MD   Multiple Vitamins-Minerals (THERAPEUTIC MULTIVITAMIN-MINERALS) tablet Take 1 tablet by mouth daily. Historical Provider, MD   Calcium Carb-Cholecalciferol (CALCIUM + D3) 600-200 MG-UNIT TABS Take 1 tablet by mouth daily.   Historical Provider, MD        Allergies   Allergen Reactions    Amoxicillin-Pot Clavulanate Diarrhea    Crestor [Rosuvastatin] Other (See Comments)     Muscle fatigue    Lipitor [Atorvastatin] Other (See Comments)     Muscle fatigue    Sulfa Antibiotics Hives       Past Medical History:   Diagnosis Date    AAA (abdominal aortic aneurysm)     Anticoagulant long-term use     Atrial fibrillation (HCC)     Carotid artery stenosis     Hepatitis A     Hyperlipidemia     Hypertension     Incisional hernia     Spinal stenosis     Thoracic back pain     Thoracic back pain/Scoliosis    Thoracic radiculopathy     Referred to [facility]       Past Surgical History:   Procedure Laterality Date    ABDOMINAL AORTIC ANEURYSM REPAIR  2005    [facility]    APPENDECTOMY      CARDIOVERSION  10/28/2014    OTHER SURGICAL HISTORY      epidural injections in spine    SPINE SURGERY 10/2014    TONSILLECTOMY         Social History     Socioeconomic History    Marital status:      Spouse name: Not on file    Number of children: Not on file    Years of education: Not on file    Highest education level: Not on file   Occupational History    Not on file   Tobacco Use    Smoking status: Former     Packs/day: 1.00     Years: 15.00     Pack years: 15.00     Types: Cigarettes     Quit date: 1990     Years since quittin.1    Smokeless tobacco: Never   Vaping Use    Vaping Use: Never used   Substance and Sexual Activity    Alcohol use: Yes     Comment: rarely    Drug use: No    Sexual activity: Not on file   Other Topics Concern    Not on file   Social History Narrative    Not on file     Social Determinants of Health     Financial Resource Strain: Low Risk     Difficulty of Paying Living Expenses: Not hard at all   Food Insecurity: No Food Insecurity    Worried About Running Out of Food in the Last Year: Never true    [address] in the Last Year: Never true   Transportation Needs: Not on file   Physical Activity: Insufficiently Active    Days of Exercise per Week: 7 days    Minutes of Exercise per Session: 20 min   Stress: Not on file   Social Connections: Not on file   Intimate Partner Violence: Not on file   Housing Stability: Not on file        Family History   Problem Relation Age of Onset    Stroke Mother     Heart Disease Father     Heart Disease Brother        Vitals:    23 1029   BP: 120/80   Pulse: 75   Temp: 97.5 °F (36.4 °C)   SpO2: 99%   Weight: 201 lb (91.2 kg)       Estimated body mass index is 31.48 kg/m² as calculated from the following:    Height as of 22: 5' 7\" (1.702 m). Weight as of this encounter: 201 lb (91.2 kg). Physical Exam  Const: Appears healthy, well developed and well nourished. Appears obese. Eyes: EOMI in both eyes. PERRL. ENMT: External ears WNL. Tympanic membranes are intact. External nose WNL. Neck: Supple and symmetric. Palpation reveals no adenopathy. No masses appreciated. Thyroid: no nodule  appreciated or thyromegaly. No jugular venous distention. Resp: Respirations are unlabored. Respiration rate is normal. Auscultate good airflow. Minimal crackles of the right and left base. CV: Rhythm is irregularly irregular. S1 is normal. S2 is normal. Carotids: no bruits. Abdominal aorta: not palpable. Pedal  pulses: 2+ and equal bilaterally. Extremities: No clubbing, cyanosis . No edema today of the lower extremity. Abdomen: Bowel sounds are normoactive. Palpation reveals softness, with no distension, organomegaly or  tenderness. No abdominal masses palpable. No palpable hepatosplenomegaly. Musculo: Walks with a limping gait. Upper Extremities: ROM: Significant limitation in range of motion of the left  shoulder. Lower Extremities: ROM: Significant limitation of range of motion of the left hip. Pain with inversion and eversion. Skin: Dry and warm with no rash. Neuro: Alert and oriented x3. Mood is normal. Affect is normal. Speech is articulate and fluent. Deep tendon reflexes are absent both patellar and plantar bilaterally. No evidence of erythema, warmth, or fluctuance  Baron Carney was seen today for other. Diagnoses and all orders for this visit:    Chronic combined systolic and diastolic heart failure (HCC)  -     Brain Natriuretic Peptide; Future  -     Comprehensive Metabolic Panel; Future  -     Magnesium; Future    Essential hypertension  -     Brain Natriuretic Peptide; Future  -     Comprehensive Metabolic Panel; Future  -     Magnesium; Future    Type 2 diabetes mellitus with stage 3a chronic kidney disease, with long-term current use of insulin (HCC)  -     Brain Natriuretic Peptide; Future  -     Comprehensive Metabolic Panel; Future  -     Magnesium; Future    Paroxysmal atrial fibrillation (HCC)  Brain natruretic peptide CMP and magnesium will be drawn today.   I will let her know if we need to make some adjustments of the medication. We did once again discussed SGLT2 inhibitors. I would probably only use these if she was becoming unresponsive to diuretic therapy.

## 2023-02-15 DIAGNOSIS — I10 ESSENTIAL HYPERTENSION: ICD-10-CM

## 2023-02-15 DIAGNOSIS — Z79.01 ANTICOAGULANT LONG-TERM USE: ICD-10-CM

## 2023-02-15 DIAGNOSIS — I50.42 CHRONIC COMBINED SYSTOLIC AND DIASTOLIC HEART FAILURE (HCC): ICD-10-CM

## 2023-02-15 DIAGNOSIS — E78.2 MIXED HYPERLIPIDEMIA: ICD-10-CM

## 2023-02-15 DIAGNOSIS — I50.32 CHRONIC HEART FAILURE WITH PRESERVED EJECTION FRACTION (HCC): ICD-10-CM

## 2023-02-15 RX ORDER — BUMETANIDE 2 MG/1
TABLET ORAL
Qty: 135 TABLET | Refills: 0 | Status: SHIPPED | OUTPATIENT
Start: 2023-02-15

## 2023-02-15 NOTE — TELEPHONE ENCOUNTER
Last Appointment:  1/3/2023  Future Appointments   Date Time Provider Orlando Rodriguez   3/21/2023  2:30 PM Migdalia Pedersen  [address] [facility] [address]

## 2023-03-03 DIAGNOSIS — I50.32 CHRONIC HEART FAILURE WITH PRESERVED EJECTION FRACTION (HCC): ICD-10-CM

## 2023-03-03 DIAGNOSIS — I10 ESSENTIAL HYPERTENSION: ICD-10-CM

## 2023-03-03 DIAGNOSIS — Z79.01 ANTICOAGULANT LONG-TERM USE: ICD-10-CM

## 2023-03-03 DIAGNOSIS — E78.2 MIXED HYPERLIPIDEMIA: ICD-10-CM

## 2023-03-03 DIAGNOSIS — I50.42 CHRONIC COMBINED SYSTOLIC AND DIASTOLIC HEART FAILURE (HCC): ICD-10-CM

## 2023-03-06 RX ORDER — EZETIMIBE 10 MG/1
TABLET ORAL
Qty: 90 TABLET | Refills: 1 | Status: SHIPPED | OUTPATIENT
Start: 2023-03-06

## 2023-03-06 RX ORDER — DULOXETIN HYDROCHLORIDE 30 MG/1
CAPSULE, DELAYED RELEASE ORAL
Qty: 90 CAPSULE | Refills: 1 | Status: SHIPPED | OUTPATIENT
Start: 2023-03-06

## 2023-03-06 NOTE — TELEPHONE ENCOUNTER
Last Appointment:  1/3/2023  Future Appointments   Date Time Provider Orlando Rodriguez   3/21/2023  2:30 PM Sasha Jameson  [address]

## 2023-03-08 DIAGNOSIS — E78.2 MIXED HYPERLIPIDEMIA: ICD-10-CM

## 2023-03-08 DIAGNOSIS — Z79.01 ANTICOAGULANT LONG-TERM USE: ICD-10-CM

## 2023-03-08 DIAGNOSIS — I50.42 CHRONIC COMBINED SYSTOLIC AND DIASTOLIC HEART FAILURE (HCC): ICD-10-CM

## 2023-03-08 DIAGNOSIS — I10 ESSENTIAL HYPERTENSION: ICD-10-CM

## 2023-03-08 DIAGNOSIS — I50.32 CHRONIC HEART FAILURE WITH PRESERVED EJECTION FRACTION (HCC): ICD-10-CM

## 2023-03-09 RX ORDER — SIMVASTATIN 40 MG
40 TABLET ORAL NIGHTLY
Qty: 90 TABLET | Refills: 3 | Status: SHIPPED | OUTPATIENT
Start: 2023-03-09

## 2023-03-09 NOTE — TELEPHONE ENCOUNTER
Last Appointment:  1/3/2023  Future Appointments   Date Time Provider Orlando Rodriguez   3/21/2023  2:30 PM Yohannes Cooper  [address] [place] [address]

## 2023-03-20 DIAGNOSIS — I10 ESSENTIAL HYPERTENSION: ICD-10-CM

## 2023-03-21 RX ORDER — LISINOPRIL 40 MG/1
TABLET ORAL
Qty: 90 TABLET | Refills: 1 | Status: SHIPPED | OUTPATIENT
Start: 2023-03-21

## 2023-03-21 NOTE — TELEPHONE ENCOUNTER
Last Appointment:  1/3/2023  Future Appointments   Date Time Provider Orlando Rodriguez   3/21/2023  2:30 PM Joann Corbett  [address] [facility] [address]

## 2023-03-25 DIAGNOSIS — Z79.01 ANTICOAGULANT LONG-TERM USE: ICD-10-CM

## 2023-03-25 DIAGNOSIS — I50.32 CHRONIC HEART FAILURE WITH PRESERVED EJECTION FRACTION (HCC): ICD-10-CM

## 2023-03-25 DIAGNOSIS — I10 ESSENTIAL HYPERTENSION: ICD-10-CM

## 2023-03-25 DIAGNOSIS — I50.42 CHRONIC COMBINED SYSTOLIC AND DIASTOLIC HEART FAILURE (HCC): ICD-10-CM

## 2023-03-25 DIAGNOSIS — E78.2 MIXED HYPERLIPIDEMIA: ICD-10-CM

## 2023-03-27 RX ORDER — CELECOXIB 200 MG/1
CAPSULE ORAL
Qty: 90 CAPSULE | Refills: 1 | Status: SHIPPED | OUTPATIENT
Start: 2023-03-27

## 2023-03-27 NOTE — TELEPHONE ENCOUNTER
Last Appointment:  1/3/2023  Future Appointments   Date Time Provider Orlando Rodriguez   3/28/2023  4:15 PM Chris Jamil  W [address] [place] [address]

## 2023-03-28 ENCOUNTER — OFFICE VISIT (OUTPATIENT)
Dept: FAMILY MEDICINE CLINIC | Age: 88
End: 2023-03-28

## 2023-03-28 VITALS
BODY MASS INDEX: 31.95 KG/M2 | HEART RATE: 70 BPM | OXYGEN SATURATION: 98 % | DIASTOLIC BLOOD PRESSURE: 70 MMHG | TEMPERATURE: 97 F | WEIGHT: 204 LBS | SYSTOLIC BLOOD PRESSURE: 140 MMHG

## 2023-03-28 DIAGNOSIS — I10 ESSENTIAL HYPERTENSION: ICD-10-CM

## 2023-03-28 DIAGNOSIS — Z79.01 ANTICOAGULANT LONG-TERM USE: ICD-10-CM

## 2023-03-28 DIAGNOSIS — I50.32 CHRONIC HEART FAILURE WITH PRESERVED EJECTION FRACTION (HCC): ICD-10-CM

## 2023-03-28 DIAGNOSIS — E78.2 MIXED HYPERLIPIDEMIA: ICD-10-CM

## 2023-03-28 DIAGNOSIS — I50.42 CHRONIC COMBINED SYSTOLIC AND DIASTOLIC HEART FAILURE (HCC): ICD-10-CM

## 2023-03-28 RX ORDER — METOPROLOL TARTRATE 50 MG/1
TABLET, FILM COATED ORAL
Qty: 270 TABLET | Refills: 3 | Status: SHIPPED | OUTPATIENT
Start: 2023-03-28

## 2023-03-28 ASSESSMENT — ENCOUNTER SYMPTOMS
ALLERGIC/IMMUNOLOGIC NEGATIVE: 1
COUGH: 0
EYES NEGATIVE: 1
BACK PAIN: 1

## 2023-03-28 NOTE — PROGRESS NOTES
status:      Spouse name: Not on file    Number of children: Not on file    Years of education: Not on file    Highest education level: Not on file   Occupational History    Not on file   Tobacco Use    Smoking status: Former     Packs/day: 1.00     Years: 15.00     Pack years: 15.00     Types: Cigarettes     Quit date: 1990     Years since quittin.4    Smokeless tobacco: Never   Vaping Use    Vaping Use: Never used   Substance and Sexual Activity    Alcohol use: Yes     Comment: rarely    Drug use: No    Sexual activity: Not on file   Other Topics Concern    Not on file   Social History Narrative    Not on file     Social Determinants of Health     Financial Resource Strain: Low Risk     Difficulty of Paying Living Expenses: Not hard at all   Food Insecurity: No Food Insecurity    Worried About Running Out of Food in the Last Year: Never true    [address] [place] [address] St N in the Last Year: Never true   Transportation Needs: Not on file   Physical Activity: Insufficiently Active    Days of Exercise per Week: 7 days    Minutes of Exercise per Session: 20 min   Stress: Not on file   Social Connections: Not on file   Intimate Partner Violence: Not on file   Housing Stability: Not on file        Family History   Problem Relation Age of Onset    Stroke Mother     Heart Disease Father     Heart Disease Brother        Vitals:    23 1611   BP: (!) 140/70   Pulse: 70   Temp: 97 °F (36.1 °C)   SpO2: 98%   Weight: 204 lb (92.5 kg)       Estimated body mass index is 31.95 kg/m² as calculated from the following:    Height as of 22: 5' 7\" (1.702 m). Weight as of this encounter: 204 lb (92.5 kg). Physical Exam  Const: Appears healthy, well developed and well nourished. Appears obese. Eyes: EOMI in both eyes. PERRL. ENMT: External ears WNL. Tympanic membranes are intact. External nose WNL. Neck: Supple and symmetric. Palpation reveals no adenopathy. No masses appreciated.  Thyroid: no nodule  appreciated

## 2023-03-28 NOTE — TELEPHONE ENCOUNTER
Last Appointment:  1/3/2023  Future Appointments   Date Time Provider Orlando Rodriguez   3/28/2023  4:15 PM Chris Jamil  W [address] [place] [address]

## 2023-03-29 DIAGNOSIS — E78.2 MIXED HYPERLIPIDEMIA: ICD-10-CM

## 2023-03-29 DIAGNOSIS — I50.32 CHRONIC HEART FAILURE WITH PRESERVED EJECTION FRACTION (HCC): ICD-10-CM

## 2023-03-29 DIAGNOSIS — Z79.01 ANTICOAGULANT LONG-TERM USE: ICD-10-CM

## 2023-03-29 DIAGNOSIS — I10 ESSENTIAL HYPERTENSION: ICD-10-CM

## 2023-03-29 DIAGNOSIS — I50.42 CHRONIC COMBINED SYSTOLIC AND DIASTOLIC HEART FAILURE (HCC): ICD-10-CM

## 2023-03-29 LAB
ALBUMIN SERPL-MCNC: 4.6 G/DL (ref 3.5–5.2)
ALP SERPL-CCNC: 54 U/L (ref 35–104)
ALT SERPL-CCNC: 10 U/L (ref 0–32)
ANION GAP SERPL CALCULATED.3IONS-SCNC: 16 MMOL/L (ref 7–16)
AST SERPL-CCNC: 20 U/L (ref 0–31)
BILIRUB SERPL-MCNC: 0.6 MG/DL (ref 0–1.2)
BNP BLD-MCNC: 2363 PG/ML (ref 0–450)
BUN SERPL-MCNC: 32 MG/DL (ref 6–23)
CALCIUM SERPL-MCNC: 10.1 MG/DL (ref 8.6–10.2)
CHLORIDE SERPL-SCNC: 106 MMOL/L (ref 98–107)
CO2 SERPL-SCNC: 25 MMOL/L (ref 22–29)
CREAT SERPL-MCNC: 1.3 MG/DL (ref 0.5–1)
GLUCOSE SERPL-MCNC: 151 MG/DL (ref 74–99)
POTASSIUM SERPL-SCNC: 4.8 MMOL/L (ref 3.5–5)
PROT SERPL-MCNC: 7.3 G/DL (ref 6.4–8.3)
SODIUM SERPL-SCNC: 147 MMOL/L (ref 132–146)

## 2023-03-30 DIAGNOSIS — E87.0 HYPERNATREMIA: Primary | ICD-10-CM

## 2023-04-10 DIAGNOSIS — E87.0 HYPERNATREMIA: ICD-10-CM

## 2023-04-10 LAB
ANION GAP SERPL CALCULATED.3IONS-SCNC: 14 MMOL/L (ref 7–16)
BUN SERPL-MCNC: 29 MG/DL (ref 6–23)
CALCIUM SERPL-MCNC: 10.9 MG/DL (ref 8.6–10.2)
CHLORIDE SERPL-SCNC: 101 MMOL/L (ref 98–107)
CO2 SERPL-SCNC: 28 MMOL/L (ref 22–29)
CREAT SERPL-MCNC: 1.3 MG/DL (ref 0.5–1)
GLUCOSE SERPL-MCNC: 201 MG/DL (ref 74–99)
POTASSIUM SERPL-SCNC: 4.2 MMOL/L (ref 3.5–5)
SODIUM SERPL-SCNC: 143 MMOL/L (ref 132–146)

## 2023-05-02 DIAGNOSIS — Z79.01 ANTICOAGULANT LONG-TERM USE: ICD-10-CM

## 2023-05-02 DIAGNOSIS — I50.42 CHRONIC COMBINED SYSTOLIC AND DIASTOLIC HEART FAILURE (HCC): ICD-10-CM

## 2023-05-02 DIAGNOSIS — I10 ESSENTIAL HYPERTENSION: ICD-10-CM

## 2023-05-02 DIAGNOSIS — E78.2 MIXED HYPERLIPIDEMIA: ICD-10-CM

## 2023-05-02 DIAGNOSIS — I50.32 CHRONIC HEART FAILURE WITH PRESERVED EJECTION FRACTION (HCC): ICD-10-CM

## 2023-05-02 RX ORDER — BUMETANIDE 2 MG/1
TABLET ORAL
Qty: 135 TABLET | Refills: 0 | Status: SHIPPED | OUTPATIENT
Start: 2023-05-02

## 2023-05-02 RX ORDER — ALLOPURINOL 100 MG/1
TABLET ORAL
Qty: 120 TABLET | Refills: 5 | Status: SHIPPED | OUTPATIENT
Start: 2023-05-02

## 2023-05-02 NOTE — TELEPHONE ENCOUNTER
Jose Francisco Mart calling if for the pended medicaitons and also the duloxetine all to be sent to [facility] in [place]

## 2023-05-11 DIAGNOSIS — I50.42 CHRONIC COMBINED SYSTOLIC AND DIASTOLIC HEART FAILURE (HCC): ICD-10-CM

## 2023-05-11 DIAGNOSIS — I10 ESSENTIAL HYPERTENSION: ICD-10-CM

## 2023-05-11 DIAGNOSIS — Z79.01 ANTICOAGULANT LONG-TERM USE: ICD-10-CM

## 2023-05-11 DIAGNOSIS — E78.2 MIXED HYPERLIPIDEMIA: ICD-10-CM

## 2023-05-11 DIAGNOSIS — I50.32 CHRONIC HEART FAILURE WITH PRESERVED EJECTION FRACTION (HCC): ICD-10-CM

## 2023-05-11 RX ORDER — BUMETANIDE 2 MG/1
TABLET ORAL
Qty: 135 TABLET | Refills: 0 | OUTPATIENT
Start: 2023-05-11

## 2023-05-12 ENCOUNTER — OFFICE VISIT (OUTPATIENT)
Dept: CARDIOLOGY CLINIC | Age: 88
End: 2023-05-12

## 2023-05-12 VITALS
BODY MASS INDEX: 33.26 KG/M2 | SYSTOLIC BLOOD PRESSURE: 138 MMHG | HEART RATE: 76 BPM | HEIGHT: 67 IN | WEIGHT: 211.9 LBS | DIASTOLIC BLOOD PRESSURE: 80 MMHG | RESPIRATION RATE: 16 BRPM

## 2023-05-12 DIAGNOSIS — Z86.16 HISTORY OF COVID-19: ICD-10-CM

## 2023-05-12 DIAGNOSIS — Z79.01 ANTICOAGULANT LONG-TERM USE: ICD-10-CM

## 2023-05-12 DIAGNOSIS — I50.42 CHRONIC COMBINED SYSTOLIC AND DIASTOLIC HEART FAILURE (HCC): ICD-10-CM

## 2023-05-12 DIAGNOSIS — M48.062 SPINAL STENOSIS OF LUMBAR REGION WITH NEUROGENIC CLAUDICATION: ICD-10-CM

## 2023-05-12 DIAGNOSIS — E11.9 TYPE 2 DIABETES MELLITUS WITHOUT COMPLICATION, WITHOUT LONG-TERM CURRENT USE OF INSULIN (HCC): ICD-10-CM

## 2023-05-12 DIAGNOSIS — N18.31 STAGE 3A CHRONIC KIDNEY DISEASE (HCC): ICD-10-CM

## 2023-05-12 DIAGNOSIS — E78.2 MIXED HYPERLIPIDEMIA: ICD-10-CM

## 2023-05-12 DIAGNOSIS — I73.9 VASCULAR CLAUDICATION (HCC): ICD-10-CM

## 2023-05-12 DIAGNOSIS — I10 ESSENTIAL HYPERTENSION: ICD-10-CM

## 2023-05-12 DIAGNOSIS — I48.0 PAROXYSMAL ATRIAL FIBRILLATION (HCC): Primary | ICD-10-CM

## 2023-05-12 DIAGNOSIS — I49.3 PVC (PREMATURE VENTRICULAR CONTRACTION): ICD-10-CM

## 2023-05-12 RX ORDER — FUROSEMIDE 20 MG/1
TABLET ORAL
COMMUNITY
Start: 2022-01-07

## 2023-05-12 NOTE — PROGRESS NOTES
Out Patient CARDIOLOGY Follow Up Visit    Name: Giorgi Palencia    Age: 82 y.o. Date of Admission: No admission date for patient encounter. Date of Service: 5/14/2023    Reason for Consultation:   Chief Complaint   Patient presents with    Atrial Fibrillation          Referring Physician: No admitting provider for patient encounter. History of Present Illness: 61-year-old female with history of multiple medical problems including but not limited to hx of PVC prior zio patch showed 7.7% PVC burden,history of AAA repair in 2005 ,hypertension, hyperlipidemia, COPD, spinal stenosis s/p surgery in 2014, history of hepatitis A, AAA repair in 2005, paroxysmal atrial fibrillation for which she has been on Eliquis for anticoagulation and beta-blocker for rate control. She is also on Bumex for heart failure with preserved ejection fraction. She presented for follow-up visit today and reports doing well and denies any symptoms. She does report she is planning a dental work with her dentist.     Medical History:  No history of MI, CHF, stroke, CKD. COPD. Hypertension. Hyperlipidemia. Spinal stenosis. Surgery, 10/20/2014. Hepatitis A. Tonsillectomy, appendectomy. Cigarette abuse, 15 pack years. Quit 1990. Allergy/intolerance to Crestor, Lipitor, sulfa. AAA repair, 2005, [facility] [address] [facility] [address]. Dobutrex stress echo, 11/18/2005. Normal. Stage II diastolic dysfunction. Mild CLVH. Chest CT, 07/01/2009. Calcified granuloma. Right basilar infiltrate/atelectasis with small effusion. COPD. AF. BAO guided cardioversion, 10/28/2014, [facility]. BAO showed EF 50%. No chamber dilatation. No thrombus or SEC in NIMESH. No hemodynamically significant valvular abnormality. The descending thoracic aorta had extensive atherosclerosis with Grade III-IV atheroma protruding into the lumen. Extensive disease throughout the descending aorta. Arch unable to be clearly assessed. Chronic anticoagulation with Eliquis started 10/2014.

## 2023-05-15 ENCOUNTER — TELEPHONE (OUTPATIENT)
Dept: FAMILY MEDICINE CLINIC | Age: 88
End: 2023-05-15

## 2023-05-15 DIAGNOSIS — M19.90 ARTHRITIS: ICD-10-CM

## 2023-05-15 RX ORDER — HYDROCODONE BITARTRATE AND ACETAMINOPHEN 5; 325 MG/1; MG/1
1 TABLET ORAL EVERY 6 HOURS PRN
Qty: 120 TABLET | Refills: 0 | Status: SHIPPED | OUTPATIENT
Start: 2023-05-15 | End: 2023-06-14

## 2023-05-15 NOTE — TELEPHONE ENCOUNTER
Guillermina Díaz calling in requesting another prescription for Norco 5-325 mg. She said she was to let you know when she was out and needed more. [facility] in [place].

## 2023-06-09 DIAGNOSIS — M19.90 ARTHRITIS: ICD-10-CM

## 2023-06-09 RX ORDER — HYDROCODONE BITARTRATE AND ACETAMINOPHEN 5; 325 MG/1; MG/1
1 TABLET ORAL EVERY 6 HOURS PRN
Qty: 120 TABLET | Refills: 0 | Status: SHIPPED | OUTPATIENT
Start: 2023-06-09 | End: 2023-07-09

## 2023-06-09 NOTE — TELEPHONE ENCOUNTER
Patient requesting refill. Uses [facility] in [place] .     Last Appointment:  3/28/2023  Future Appointments   Date Time Provider Orlando Jennifer   7/18/2023  2:00 PM Khushboo Henderson MD [facility]   8/28/2023  9:45 AM Patrick Delcid MD [facility]

## 2023-07-18 ENCOUNTER — OFFICE VISIT (OUTPATIENT)
Dept: FAMILY MEDICINE CLINIC | Age: 88
End: 2023-07-18
Payer: MEDICARE

## 2023-07-18 VITALS
SYSTOLIC BLOOD PRESSURE: 130 MMHG | HEART RATE: 89 BPM | OXYGEN SATURATION: 97 % | TEMPERATURE: 97.3 F | DIASTOLIC BLOOD PRESSURE: 60 MMHG | BODY MASS INDEX: 33.05 KG/M2 | WEIGHT: 211 LBS

## 2023-07-18 DIAGNOSIS — E11.9 TYPE 2 DIABETES MELLITUS WITHOUT COMPLICATION, WITHOUT LONG-TERM CURRENT USE OF INSULIN (HCC): Primary | ICD-10-CM

## 2023-07-18 DIAGNOSIS — I50.42 CHRONIC COMBINED SYSTOLIC AND DIASTOLIC HEART FAILURE (HCC): ICD-10-CM

## 2023-07-18 DIAGNOSIS — I10 ESSENTIAL HYPERTENSION: ICD-10-CM

## 2023-07-18 DIAGNOSIS — E11.9 TYPE 2 DIABETES MELLITUS WITHOUT COMPLICATION, WITHOUT LONG-TERM CURRENT USE OF INSULIN (HCC): ICD-10-CM

## 2023-07-18 DIAGNOSIS — M54.6 THORACIC SPINE PAIN: ICD-10-CM

## 2023-07-18 DIAGNOSIS — I50.32 CHRONIC HEART FAILURE WITH PRESERVED EJECTION FRACTION (HCC): ICD-10-CM

## 2023-07-18 DIAGNOSIS — Z79.01 ANTICOAGULANT LONG-TERM USE: ICD-10-CM

## 2023-07-18 DIAGNOSIS — E78.2 MIXED HYPERLIPIDEMIA: ICD-10-CM

## 2023-07-18 LAB
ALBUMIN SERPL-MCNC: 4.7 G/DL (ref 3.5–5.2)
ALP BLD-CCNC: 56 U/L (ref 35–104)
ALT SERPL-CCNC: 11 U/L (ref 0–32)
ANION GAP SERPL CALCULATED.3IONS-SCNC: 13 MMOL/L (ref 7–16)
AST SERPL-CCNC: 26 U/L (ref 0–31)
BILIRUB SERPL-MCNC: 0.6 MG/DL (ref 0–1.2)
BUN BLDV-MCNC: 33 MG/DL (ref 6–23)
CALCIUM SERPL-MCNC: 10.3 MG/DL (ref 8.6–10.2)
CHLORIDE BLD-SCNC: 101 MMOL/L (ref 98–107)
CHOLESTEROL: 127 MG/DL
CO2: 26 MMOL/L (ref 22–29)
CREAT SERPL-MCNC: 1.3 MG/DL (ref 0.5–1)
GFR SERPL CREATININE-BSD FRML MDRD: 42 ML/MIN/1.73M2
GLUCOSE BLD-MCNC: 80 MG/DL (ref 74–99)
HBA1C MFR BLD: 6.1 %
HDLC SERPL-MCNC: 32 MG/DL
LDL CHOLESTEROL: 57 MG/DL
POTASSIUM SERPL-SCNC: 4.4 MMOL/L (ref 3.5–5)
SODIUM BLD-SCNC: 140 MMOL/L (ref 132–146)
TOTAL PROTEIN: 7.6 G/DL (ref 6.4–8.3)
TRIGL SERPL-MCNC: 190 MG/DL
VLDLC SERPL CALC-MCNC: 38 MG/DL

## 2023-07-18 PROCEDURE — 1036F TOBACCO NON-USER: CPT | Performed by: INTERNAL MEDICINE

## 2023-07-18 PROCEDURE — G8417 CALC BMI ABV UP PARAM F/U: HCPCS | Performed by: INTERNAL MEDICINE

## 2023-07-18 PROCEDURE — 99214 OFFICE O/P EST MOD 30 MIN: CPT | Performed by: INTERNAL MEDICINE

## 2023-07-18 PROCEDURE — 83037 HB GLYCOSYLATED A1C HOME DEV: CPT | Performed by: INTERNAL MEDICINE

## 2023-07-18 PROCEDURE — 1090F PRES/ABSN URINE INCON ASSESS: CPT | Performed by: INTERNAL MEDICINE

## 2023-07-18 PROCEDURE — 3075F SYST BP GE 130 - 139MM HG: CPT | Performed by: INTERNAL MEDICINE

## 2023-07-18 PROCEDURE — G8400 PT W/DXA NO RESULTS DOC: HCPCS | Performed by: INTERNAL MEDICINE

## 2023-07-18 PROCEDURE — G8427 DOCREV CUR MEDS BY ELIG CLIN: HCPCS | Performed by: INTERNAL MEDICINE

## 2023-07-18 PROCEDURE — 3078F DIAST BP <80 MM HG: CPT | Performed by: INTERNAL MEDICINE

## 2023-07-18 PROCEDURE — 1123F ACP DISCUSS/DSCN MKR DOCD: CPT | Performed by: INTERNAL MEDICINE

## 2023-07-18 RX ORDER — CELECOXIB 200 MG/1
200 CAPSULE ORAL DAILY
Qty: 90 CAPSULE | Refills: 1 | Status: SHIPPED | OUTPATIENT
Start: 2023-07-18

## 2023-07-18 RX ORDER — LISINOPRIL 40 MG/1
40 TABLET ORAL DAILY
Qty: 90 TABLET | Refills: 1 | Status: SHIPPED | OUTPATIENT
Start: 2023-07-18

## 2023-07-18 RX ORDER — DULOXETIN HYDROCHLORIDE 30 MG/1
CAPSULE, DELAYED RELEASE ORAL
Qty: 90 CAPSULE | Refills: 1 | Status: SHIPPED | OUTPATIENT
Start: 2023-07-18

## 2023-07-18 ASSESSMENT — ENCOUNTER SYMPTOMS
BACK PAIN: 1
COUGH: 0
ALLERGIC/IMMUNOLOGIC NEGATIVE: 1
EYES NEGATIVE: 1

## 2023-07-18 NOTE — PROGRESS NOTES
2023    Priti Hernandez (:  1935) is a 82 y.o. female, here for evaluation of the following medical concerns:    Patient continues to have back pain. She states that the injection she received really was not very helpful. She is currently going to a chiropractor. Sounds like she is getting some acupressure points stimulation. She states that the pain is actually moved up somewhat than the pain that she had in her lumbar spine seems to be a little bit better which possibly could be from the injection. She is not complaining of pain at about the level of T8. She denies any falls or recent any injuries. She does have congestive heart failure but is not having current symptoms. She denies any increased peripheral lymphedema. She has not had any episodes of recurrent gout. She is on chronic anticoagulation but without any bleeding issues. She does take Celebrex without side effects. Her hemoglobin A1c today is well controlled at 6.1. She looked a little bit down today but she states that she did not sleep well because her  had been sick. Review of Systems   Constitutional: Negative. HENT: Negative. Eyes: Negative. Respiratory:  Negative for cough. Cardiovascular:         Atrial fibrillation without evidence of rapid ventricular response. Improved symptomatology with increased Bumex dosing. Gastrointestinal:         Denies abdominal pain. Endocrine:        Diabetes with a hemoglobin A1c of 6.8   Genitourinary: Negative. Musculoskeletal:  Positive for back pain. Thoracic and lumbar spine pain. Recent ablative procedure. Allergic/Immunologic: Negative. Neurological: Negative. Hematological: Negative. Psychiatric/Behavioral:          Improved depression with the use of Cymbalta.    Problem List: Malaise and fatigue, Essential hypertension, Hyperlipidemia  Health Maintenance:  Colonoscopy - (2014)  Bone Density Test Screening -

## 2023-07-24 DIAGNOSIS — Z79.01 ANTICOAGULANT LONG-TERM USE: ICD-10-CM

## 2023-07-24 DIAGNOSIS — I10 ESSENTIAL HYPERTENSION: ICD-10-CM

## 2023-07-24 DIAGNOSIS — E78.2 MIXED HYPERLIPIDEMIA: ICD-10-CM

## 2023-07-24 DIAGNOSIS — I50.42 CHRONIC COMBINED SYSTOLIC AND DIASTOLIC HEART FAILURE (HCC): ICD-10-CM

## 2023-07-24 DIAGNOSIS — I50.32 CHRONIC HEART FAILURE WITH PRESERVED EJECTION FRACTION (HCC): ICD-10-CM

## 2023-07-24 RX ORDER — BUMETANIDE 2 MG/1
TABLET ORAL
Qty: 135 TABLET | Refills: 0 | Status: SHIPPED | OUTPATIENT
Start: 2023-07-24

## 2023-08-16 RX ORDER — COLCHICINE 0.6 MG/1
TABLET ORAL
Qty: 120 TABLET | Refills: 2 | Status: SHIPPED | OUTPATIENT
Start: 2023-08-16

## 2023-08-16 NOTE — TELEPHONE ENCOUNTER
Medication(s) pended?    [x] Yes  [] No    Last Appointment:  7/18/2023    Future appts:  Future Appointments   Date Time Provider [address]   8/28/2023  9:45 AM Vane Valdez MD [facility]   11/21/2023 11:30 AM Chrissie Keating MD [address] [place] [address]

## 2023-08-24 ENCOUNTER — TELEPHONE (OUTPATIENT)
Dept: CARDIOLOGY CLINIC | Age: 88
End: 2023-08-24

## 2023-08-24 NOTE — TELEPHONE ENCOUNTER
Call to  health benefits, automated line. Coverage active 1/1/2020. Fax confirmation scanned into media.

## 2023-08-29 DIAGNOSIS — Z79.01 ANTICOAGULANT LONG-TERM USE: ICD-10-CM

## 2023-08-29 DIAGNOSIS — E78.2 MIXED HYPERLIPIDEMIA: ICD-10-CM

## 2023-08-29 DIAGNOSIS — I50.42 CHRONIC COMBINED SYSTOLIC AND DIASTOLIC HEART FAILURE (HCC): ICD-10-CM

## 2023-08-29 DIAGNOSIS — I10 ESSENTIAL HYPERTENSION: ICD-10-CM

## 2023-08-29 DIAGNOSIS — I50.32 CHRONIC HEART FAILURE WITH PRESERVED EJECTION FRACTION (HCC): ICD-10-CM

## 2023-08-29 RX ORDER — EZETIMIBE 10 MG/1
TABLET ORAL
Qty: 90 TABLET | Refills: 1 | Status: SHIPPED | OUTPATIENT
Start: 2023-08-29

## 2023-08-29 NOTE — TELEPHONE ENCOUNTER
Last Appointment:  7/18/2023  Future Appointments   Date Time Provider [address]   11/21/2023 11:30 AM Jose Juan Gonzalez  [facility]

## 2023-10-05 ENCOUNTER — TELEPHONE (OUTPATIENT)
Dept: FAMILY MEDICINE CLINIC | Age: 88
End: 2023-10-05

## 2023-10-05 NOTE — TELEPHONE ENCOUNTER
----- Message from Arjun Hernandez sent at 10/5/2023  8:59 AM EDT -----  Subject: Message to Provider    QUESTIONS  Information for Provider? Patient's  is requiesting information on   the proedure that they talked about in office, the Spinal Cord Stimulation   Implant, and the location of the treatment. Please advise. Thank you   ---------------------------------------------------------------------------  --------------  Luciana MARLEY  [phone]; OK to leave message on voicemail  ---------------------------------------------------------------------------  --------------  SCRIPT ANSWERS  Relationship to Patient? Spouse/Partner  Representative Name? David Emory    Is the representative on the Communication Release of Information (GINA)   form in Epic?  Yes

## 2023-10-06 DIAGNOSIS — M54.6 THORACIC SPINE PAIN: Primary | ICD-10-CM

## 2023-10-06 DIAGNOSIS — M48.062 SPINAL STENOSIS OF LUMBAR REGION WITH NEUROGENIC CLAUDICATION: ICD-10-CM

## 2023-10-06 NOTE — TELEPHONE ENCOUNTER
iLssett Lockwood MD  You 2 hours ago (1:00 PM)       Referral to [facility] pain management is in the chart.

## 2023-10-12 ENCOUNTER — TELEPHONE (OUTPATIENT)
Dept: CARDIOLOGY CLINIC | Age: 88
End: 2023-10-12

## 2023-10-12 NOTE — TELEPHONE ENCOUNTER
Patient needs cardiac clearance for cataract surgery by [id number] [address]ON at [facility] under [address]. Patient denies any chest pain, shortness of breath or palpitations since last visit in May 2023. Patient is able to complete all activities of daily living, including; climbing one flight of stairs and walking one block without cardiac symptoms. Patient on Eliquis. Please advise.

## 2023-10-18 DIAGNOSIS — Z79.01 ANTICOAGULANT LONG-TERM USE: ICD-10-CM

## 2023-10-18 DIAGNOSIS — E78.2 MIXED HYPERLIPIDEMIA: ICD-10-CM

## 2023-10-18 DIAGNOSIS — I10 ESSENTIAL HYPERTENSION: ICD-10-CM

## 2023-10-18 DIAGNOSIS — I50.32 CHRONIC HEART FAILURE WITH PRESERVED EJECTION FRACTION (HCC): ICD-10-CM

## 2023-10-18 DIAGNOSIS — I50.42 CHRONIC COMBINED SYSTOLIC AND DIASTOLIC HEART FAILURE (HCC): ICD-10-CM

## 2023-10-18 RX ORDER — BUMETANIDE 2 MG/1
TABLET ORAL
Qty: 135 TABLET | Refills: 0 | Status: SHIPPED | OUTPATIENT
Start: 2023-10-18

## 2023-10-18 NOTE — TELEPHONE ENCOUNTER
Patient notified of Dr. Rozina Flowers results and recommendations.  Patient verbalized understanding note faxed to [address] at [facility]

## 2023-10-19 DIAGNOSIS — I50.32 CHRONIC HEART FAILURE WITH PRESERVED EJECTION FRACTION (HCC): ICD-10-CM

## 2023-10-19 DIAGNOSIS — I10 ESSENTIAL HYPERTENSION: ICD-10-CM

## 2023-10-19 DIAGNOSIS — E78.2 MIXED HYPERLIPIDEMIA: ICD-10-CM

## 2023-10-19 DIAGNOSIS — Z79.01 ANTICOAGULANT LONG-TERM USE: ICD-10-CM

## 2023-10-19 DIAGNOSIS — I50.42 CHRONIC COMBINED SYSTOLIC AND DIASTOLIC HEART FAILURE (HCC): ICD-10-CM

## 2023-10-19 RX ORDER — ALLOPURINOL 100 MG/1
TABLET ORAL
Qty: 120 TABLET | Refills: 5 | Status: SHIPPED | OUTPATIENT
Start: 2023-10-19

## 2023-11-21 ENCOUNTER — OFFICE VISIT (OUTPATIENT)
Dept: FAMILY MEDICINE CLINIC | Age: 88
End: 2023-11-21

## 2023-11-21 ENCOUNTER — OFFICE VISIT (OUTPATIENT)
Dept: FAMILY MEDICINE CLINIC | Age: 88
End: 2023-11-21
Payer: MEDICARE

## 2023-11-21 VITALS
WEIGHT: 202 LBS | TEMPERATURE: 98.8 F | HEART RATE: 75 BPM | DIASTOLIC BLOOD PRESSURE: 80 MMHG | OXYGEN SATURATION: 95 % | BODY MASS INDEX: 31.64 KG/M2 | SYSTOLIC BLOOD PRESSURE: 120 MMHG

## 2023-11-21 VITALS — BODY MASS INDEX: 31.64 KG/M2 | WEIGHT: 202 LBS

## 2023-11-21 DIAGNOSIS — I50.42 CHRONIC COMBINED SYSTOLIC AND DIASTOLIC HEART FAILURE (HCC): ICD-10-CM

## 2023-11-21 DIAGNOSIS — Z00.00 ENCOUNTER FOR SUBSEQUENT ANNUAL WELLNESS VISIT IN MEDICARE PATIENT: ICD-10-CM

## 2023-11-21 DIAGNOSIS — Z79.01 ANTICOAGULANT LONG-TERM USE: ICD-10-CM

## 2023-11-21 DIAGNOSIS — E78.2 MIXED HYPERLIPIDEMIA: ICD-10-CM

## 2023-11-21 DIAGNOSIS — Z00.00 MEDICARE ANNUAL WELLNESS VISIT, SUBSEQUENT: Primary | ICD-10-CM

## 2023-11-21 DIAGNOSIS — I10 ESSENTIAL HYPERTENSION: ICD-10-CM

## 2023-11-21 DIAGNOSIS — Z23 NEED FOR INFLUENZA VACCINATION: ICD-10-CM

## 2023-11-21 DIAGNOSIS — E11.9 TYPE 2 DIABETES MELLITUS WITHOUT COMPLICATION, WITHOUT LONG-TERM CURRENT USE OF INSULIN (HCC): Primary | ICD-10-CM

## 2023-11-21 DIAGNOSIS — I50.32 CHRONIC HEART FAILURE WITH PRESERVED EJECTION FRACTION (HCC): ICD-10-CM

## 2023-11-21 DIAGNOSIS — E11.9 TYPE 2 DIABETES MELLITUS WITHOUT COMPLICATION, WITHOUT LONG-TERM CURRENT USE OF INSULIN (HCC): ICD-10-CM

## 2023-11-21 LAB
ALBUMIN SERPL-MCNC: 4.9 G/DL (ref 3.5–5.2)
ALP BLD-CCNC: 69 U/L (ref 35–104)
ALT SERPL-CCNC: 13 U/L (ref 0–32)
ANION GAP SERPL CALCULATED.3IONS-SCNC: 18 MMOL/L (ref 7–16)
AST SERPL-CCNC: 22 U/L (ref 0–31)
BILIRUB SERPL-MCNC: 0.6 MG/DL (ref 0–1.2)
BUN BLDV-MCNC: 29 MG/DL (ref 6–23)
CALCIUM SERPL-MCNC: 10.2 MG/DL (ref 8.6–10.2)
CHLORIDE BLD-SCNC: 101 MMOL/L (ref 98–107)
CO2: 23 MMOL/L (ref 22–29)
CREAT SERPL-MCNC: 1.2 MG/DL (ref 0.5–1)
CREATININE URINE: 34.5 MG/DL (ref 29–226)
GFR SERPL CREATININE-BSD FRML MDRD: 42 ML/MIN/1.73M2
GLUCOSE BLD-MCNC: 75 MG/DL (ref 74–99)
HBA1C MFR BLD: 6.1 %
MICROALBUMIN/CREAT 24H UR: 20 MG/L (ref 0–19)
MICROALBUMIN/CREAT UR-RTO: 59 MCG/MG CREAT (ref 0–30)
POTASSIUM SERPL-SCNC: 4.6 MMOL/L (ref 3.5–5)
SODIUM BLD-SCNC: 142 MMOL/L (ref 132–146)
TOTAL PROTEIN: 7.8 G/DL (ref 6.4–8.3)
URIC ACID: 4 MG/DL (ref 2.4–5.7)

## 2023-11-21 PROCEDURE — 1123F ACP DISCUSS/DSCN MKR DOCD: CPT | Performed by: INTERNAL MEDICINE

## 2023-11-21 PROCEDURE — G0439 PPPS, SUBSEQ VISIT: HCPCS | Performed by: INTERNAL MEDICINE

## 2023-11-21 PROCEDURE — G8484 FLU IMMUNIZE NO ADMIN: HCPCS | Performed by: INTERNAL MEDICINE

## 2023-11-21 RX ORDER — BUMETANIDE 2 MG/1
TABLET ORAL
Qty: 135 TABLET | Refills: 1 | Status: SHIPPED | OUTPATIENT
Start: 2023-11-21

## 2023-11-21 RX ORDER — DULOXETIN HYDROCHLORIDE 30 MG/1
CAPSULE, DELAYED RELEASE ORAL
Qty: 90 CAPSULE | Refills: 1 | Status: SHIPPED | OUTPATIENT
Start: 2023-11-21

## 2023-11-21 ASSESSMENT — PATIENT HEALTH QUESTIONNAIRE - PHQ9
SUM OF ALL RESPONSES TO PHQ QUESTIONS 1-9: 9
6. FEELING BAD ABOUT YOURSELF - OR THAT YOU ARE A FAILURE OR HAVE LET YOURSELF OR YOUR FAMILY DOWN: 1
5. POOR APPETITE OR OVEREATING: 2
4. FEELING TIRED OR HAVING LITTLE ENERGY: 3
8. MOVING OR SPEAKING SO SLOWLY THAT OTHER PEOPLE COULD HAVE NOTICED. OR THE OPPOSITE, BEING SO FIGETY OR RESTLESS THAT YOU HAVE BEEN MOVING AROUND A LOT MORE THAN USUAL: 0
SUM OF ALL RESPONSES TO PHQ QUESTIONS 1-9: 9
2. FEELING DOWN, DEPRESSED OR HOPELESS: 0
7. TROUBLE CONCENTRATING ON THINGS, SUCH AS READING THE NEWSPAPER OR WATCHING TELEVISION: 0
9. THOUGHTS THAT YOU WOULD BE BETTER OFF DEAD, OR OF HURTING YOURSELF: 0
SUM OF ALL RESPONSES TO PHQ QUESTIONS 1-9: 9
1. LITTLE INTEREST OR PLEASURE IN DOING THINGS: 0
3. TROUBLE FALLING OR STAYING ASLEEP: 3
SUM OF ALL RESPONSES TO PHQ9 QUESTIONS 1 & 2: 0
10. IF YOU CHECKED OFF ANY PROBLEMS, HOW DIFFICULT HAVE THESE PROBLEMS MADE IT FOR YOU TO DO YOUR WORK, TAKE CARE OF THINGS AT HOME, OR GET ALONG WITH OTHER PEOPLE: 0
SUM OF ALL RESPONSES TO PHQ QUESTIONS 1-9: 9

## 2023-11-21 ASSESSMENT — COLUMBIA-SUICIDE SEVERITY RATING SCALE - C-SSRS
4. HAVE YOU HAD THESE THOUGHTS AND HAD SOME INTENTION OF ACTING ON THEM?: NO
3. HAVE YOU BEEN THINKING ABOUT HOW YOU MIGHT KILL YOURSELF?: NO
5. HAVE YOU STARTED TO WORK OUT OR WORKED OUT THE DETAILS OF HOW TO KILL YOURSELF? DO YOU INTEND TO CARRY OUT THIS PLAN?: NO

## 2023-11-21 ASSESSMENT — ENCOUNTER SYMPTOMS
BACK PAIN: 1
EYES NEGATIVE: 1
COUGH: 0
ALLERGIC/IMMUNOLOGIC NEGATIVE: 1

## 2023-11-21 NOTE — PROGRESS NOTES
2023    Yao Hernandez (:  1935) is a 82 y.o. female, here for evaluation of the following medical concerns:    Patient seems to be doing relatively well. Her back issues seem to be at least at this point stable. Denying any cardiac or respiratory symptoms including orthopnea or PND. No increase in peripheral lymphedema. She is using Bumex on a regular basis. She is a little bit upset because of her 's hesitancy to move from their farm. Review of Systems   Constitutional: Negative. HENT: Negative. Eyes: Negative. Respiratory:  Negative for cough. Cardiovascular:         Atrial fibrillation without evidence of rapid ventricular response. Improved symptomatology with increased Bumex dosing. Gastrointestinal:         Denies abdominal pain. Endocrine:        Diabetes with a hemoglobin A1c of 6.1   Genitourinary: Negative. Musculoskeletal:  Positive for back pain. Thoracic and lumbar spine pain. Recent ablative procedure. Pain seems to be improved at this point. Allergic/Immunologic: Negative. Neurological: Negative. Hematological: Negative. Psychiatric/Behavioral:          Improved depression with the use of Cymbalta. Current stressors regarding possible move.      Problem List: Malaise and fatigue, Essential hypertension, Hyperlipidemia  Health Maintenance:  Colonoscopy - (2014)  Bone Density Test Screening - (2009)  Couseled on Home Safety - (10/12/2015)  Influenza Vaccination - (2020)  Tdap - (2017) DISCUSSED  Zoster/Shingles Vaccine - (2017) DISCUSSED  Shingrix Vaccine (Shingles) - (2018) SLIP GIVEN  Medical Problems:  Hypertension, Hyperlipidemia  Atrial Fibrillation - (10/2014) [facility]  Abdominal Aortic Aneurysm - CT   Thoracic Back Pain/Scoliosis  Thoracic Radiculopathy - REFERRED TO [facility]  Chronic RLL Infiltate - [facility]  Lumbar Spinal Stenosis - [facility] THERAPY 2/10 improved with therapy-

## 2023-11-21 NOTE — PROGRESS NOTES
(FLONASE) 50 MCG/ACT nasal spray 1 spray by Nasal route as needed for Rhinitis or Allergies  Hillary Key MD   Cholecalciferol (VITAMIN D3) 2000 units CAPS Take by mouth daily  ProviderCandace MD   Multiple Vitamins-Minerals (THERAPEUTIC MULTIVITAMIN-MINERALS) tablet Take 1 tablet by mouth daily  Candace Zazueta MD   Calcium Carb-Cholecalciferol (CALCIUM + D3) 600-200 MG-UNIT TABS Take 1 tablet by mouth daily  ProviderCandace MD       CareTeam (Including outside providers/suppliers regularly involved in providing care):   Patient Care Team:  Robbi Ho MD as PCP - General  Robbi Ho MD as PCP - [facility], Rosy Su MD as Consulting Physician (Cardiology)     Reviewed and updated this visit:  Allergies  Meds  Problems          Lew Spencer was seen today for medicare awv.     Diagnoses and all orders for this visit:    Encounter for subsequent annual wellness visit in Medicare patient

## 2023-12-04 ENCOUNTER — TELEPHONE (OUTPATIENT)
Dept: FAMILY MEDICINE CLINIC | Age: 88
End: 2023-12-04

## 2023-12-04 NOTE — TELEPHONE ENCOUNTER
Lala Lisawilmar is scheduled for a dental procedure with Dr Viky Stack, and was given 7 Clindamycin to begin taking prior to the procedure. She is asking if you think it is ok for her to take this?

## 2023-12-05 NOTE — TELEPHONE ENCOUNTER
Patient had left a message and wanted me to call her. She made a mistake, it's not Clindamycin, it's Doxycycline that they prescribed for her.

## 2024-01-04 ENCOUNTER — TELEPHONE (OUTPATIENT)
Dept: FAMILY MEDICINE CLINIC | Age: 89
End: 2024-01-04

## 2024-01-04 NOTE — TELEPHONE ENCOUNTER
Per Dr. Key:    Patient will need to be seen for preoperative evaluation prior to surgery.  If she is not scheduled already I am not quite sure where I will be able to fit her.  May be late on a Tuesday or Wednesday.  She does have chronically elevated brain natruretic peptide because she has dilated cardiomyopathy.

## 2024-01-04 NOTE — TELEPHONE ENCOUNTER
[facility] called.  Patient is scheduled for cataract surgery on 1/17 and 1/31.  Tova was reviewing patient's chart.  There is some concern with patient having elevated BNP on 3/29/23 without any recent recheck of BNP.  BNP on 3/29/23 was 2,363.  Tova states that anesthesiology will not do surgery if patient is in CHF.  Tova requesting patient have a BNP prior to surgery.  Also, office will be faxing medical clearance forms  to PCP and cardiology.  Please advise.

## 2024-01-04 NOTE — TELEPHONE ENCOUNTER
Patient is scheduled.    Last Appointment:  11/21/2023  Future Appointments   Date Time Provider Department Center   1/10/2024  4:00 PM Jorge Luis Key MD [facility]   3/19/2024  1:00 PM Jorge Luis Key MD [facility]   11/26/2024 11:30 AM Jorge Luis Key MD [facility]

## 2024-01-10 ENCOUNTER — OFFICE VISIT (OUTPATIENT)
Dept: FAMILY MEDICINE CLINIC | Age: 89
End: 2024-01-10

## 2024-01-10 VITALS
BODY MASS INDEX: 31.64 KG/M2 | DIASTOLIC BLOOD PRESSURE: 60 MMHG | TEMPERATURE: 98.3 F | SYSTOLIC BLOOD PRESSURE: 120 MMHG | WEIGHT: 202 LBS | HEART RATE: 78 BPM | OXYGEN SATURATION: 98 %

## 2024-01-10 DIAGNOSIS — H25.013 CORTICAL AGE-RELATED CATARACT OF BOTH EYES: ICD-10-CM

## 2024-01-10 DIAGNOSIS — N18.31 STAGE 3A CHRONIC KIDNEY DISEASE (HCC): ICD-10-CM

## 2024-01-10 DIAGNOSIS — I10 ESSENTIAL HYPERTENSION: ICD-10-CM

## 2024-01-10 DIAGNOSIS — Z79.01 ANTICOAGULANT LONG-TERM USE: ICD-10-CM

## 2024-01-10 DIAGNOSIS — M19.90 ARTHRITIS: ICD-10-CM

## 2024-01-10 DIAGNOSIS — I48.0 PAROXYSMAL ATRIAL FIBRILLATION (HCC): ICD-10-CM

## 2024-01-10 DIAGNOSIS — E11.22 TYPE 2 DIABETES MELLITUS WITH STAGE 3A CHRONIC KIDNEY DISEASE, WITH LONG-TERM CURRENT USE OF INSULIN (HCC): ICD-10-CM

## 2024-01-10 DIAGNOSIS — E11.9 TYPE 2 DIABETES MELLITUS WITHOUT COMPLICATION, WITHOUT LONG-TERM CURRENT USE OF INSULIN (HCC): ICD-10-CM

## 2024-01-10 DIAGNOSIS — N18.31 TYPE 2 DIABETES MELLITUS WITH STAGE 3A CHRONIC KIDNEY DISEASE, WITH LONG-TERM CURRENT USE OF INSULIN (HCC): ICD-10-CM

## 2024-01-10 DIAGNOSIS — I50.42 CHRONIC COMBINED SYSTOLIC AND DIASTOLIC HEART FAILURE (HCC): ICD-10-CM

## 2024-01-10 DIAGNOSIS — Z79.4 TYPE 2 DIABETES MELLITUS WITH STAGE 3A CHRONIC KIDNEY DISEASE, WITH LONG-TERM CURRENT USE OF INSULIN (HCC): ICD-10-CM

## 2024-01-10 DIAGNOSIS — I50.32 CHRONIC HEART FAILURE WITH PRESERVED EJECTION FRACTION (HCC): ICD-10-CM

## 2024-01-10 DIAGNOSIS — H25.013 CORTICAL AGE-RELATED CATARACT OF BOTH EYES: Primary | ICD-10-CM

## 2024-01-10 RX ORDER — LISINOPRIL 40 MG/1
40 TABLET ORAL DAILY
Qty: 90 TABLET | Refills: 1 | Status: SHIPPED | OUTPATIENT
Start: 2024-01-10

## 2024-01-10 RX ORDER — HYDROCODONE BITARTRATE AND ACETAMINOPHEN 5; 325 MG/1; MG/1
1 TABLET ORAL EVERY 6 HOURS PRN
Qty: 120 TABLET | Refills: 0 | Status: SHIPPED | OUTPATIENT
Start: 2024-01-10 | End: 2024-02-09

## 2024-01-10 ASSESSMENT — ENCOUNTER SYMPTOMS
ALLERGIC/IMMUNOLOGIC NEGATIVE: 1
BACK PAIN: 1
EYES NEGATIVE: 1
COUGH: 0

## 2024-01-10 NOTE — PROGRESS NOTES
Hyperlipidemia  Atrial Fibrillation - (10/2014) POST SURGERY-[facility]  Abdominal Aortic Aneurysm - CT   Thoracic Back Pain/Scoliosis  Thoracic Radiculopathy - REFERRED TO [facility]  Chronic RLL Infiltate - [facility]  Lumbar Spinal Stenosis - [facility] 2/10 improved with therapy- REFERRED TO [facility] 13-recurrent- failed PT-MRI ordered 3/16/2021-worsening symptomatology 2022.  Referral back to Dr. Gavin.  Incisional Hernia - (2012) CAUSING PAIN  Carotid Artery Stenosis - ()  Depression- improved with Cymbalta 2021  CHF p EF 2021  Surgical Hx:  AAA - LAST CT , ORDERED 11/10-normal CT 2019  Tonsillectomy  Appendectomy - 2011 GANGRENOUS  Lumbar Surgery - (2014) [facility]  OB/Gyn Hx:: (4)Parity: Full term (3), one miscarriage  Reviewed, no changes.  FH:  Father:  MI -  age 72.  Mother:  Cerebrovascular Accident (CVA) -  age 91.  Reviewed, no changes.  SH:  Marital: .  Personal Habits: Alcohol: Occasionally consumes wine.Exercise Type: Does housework daily.  Reviewed, no changes.  Date: 2023  Was the patient queried about smoking behavior? Yes   Does the patient currently smoke? Smoking: Patient is a former smoker.  Prior to Visit Medications    Medication Sig Taking? Authorizing Provider   apixaban (ELIQUIS) 5 MG TABS tablet take 1 tablet by mouth twice a day Yes Jorge Luis Key MD   lisinopril (PRINIVIL;ZESTRIL) 40 MG tablet Take 1 tablet by mouth daily Yes Jorge Luis Key MD   HYDROcodone-acetaminophen (NORCO) 5-325 MG per tablet Take 1 tablet by mouth every 6 hours as needed for Pain for up to 30 days. Yes Jorge Luis Key MD   DULoxetine (CYMBALTA) 30 MG extended release capsule take 1 capsule by mouth once daily Yes Jorge Luis Key MD   bumetanide (BUMEX) 2 MG tablet One po qod alternating with two every other day Yes Jorge Luis Key MD   allopurinol (ZYLOPRIM) 100 MG tablet take 4 tablets by mouth once daily Yes Jorge Luis Key MD   ezetimibe (ZETIA) 10

## 2024-01-11 ENCOUNTER — TELEPHONE (OUTPATIENT)
Dept: CARDIOLOGY CLINIC | Age: 89
End: 2024-01-11

## 2024-01-11 LAB
ABSOLUTE IMMATURE GRANULOCYTE: 0.11 K/UL (ref 0–0.58)
ALBUMIN SERPL-MCNC: 4 G/DL (ref 3.5–5.2)
ALP BLD-CCNC: 85 U/L (ref 35–104)
ALT SERPL-CCNC: 31 U/L (ref 0–32)
ANION GAP SERPL CALCULATED.3IONS-SCNC: 16 MMOL/L (ref 7–16)
AST SERPL-CCNC: 35 U/L (ref 0–31)
BASOPHILS ABSOLUTE: 0.04 K/UL (ref 0–0.2)
BASOPHILS RELATIVE PERCENT: 0 % (ref 0–2)
BILIRUB SERPL-MCNC: 0.4 MG/DL (ref 0–1.2)
BUN BLDV-MCNC: 32 MG/DL (ref 6–23)
CALCIUM SERPL-MCNC: 9.6 MG/DL (ref 8.6–10.2)
CHLORIDE BLD-SCNC: 98 MMOL/L (ref 98–107)
CO2: 25 MMOL/L (ref 22–29)
CREAT SERPL-MCNC: 1.2 MG/DL (ref 0.5–1)
EOSINOPHILS ABSOLUTE: 0.37 K/UL (ref 0.05–0.5)
EOSINOPHILS RELATIVE PERCENT: 4 % (ref 0–6)
GFR SERPL CREATININE-BSD FRML MDRD: 43 ML/MIN/1.73M2
GLUCOSE BLD-MCNC: 104 MG/DL (ref 74–99)
HCT VFR BLD CALC: 42.1 % (ref 34–48)
HEMOGLOBIN: 13.1 G/DL (ref 11.5–15.5)
IMMATURE GRANULOCYTES: 1 % (ref 0–5)
LYMPHOCYTES ABSOLUTE: 1.98 K/UL (ref 1.5–4)
LYMPHOCYTES RELATIVE PERCENT: 20 % (ref 20–42)
MCH RBC QN AUTO: 29.3 PG (ref 26–35)
MCHC RBC AUTO-ENTMCNC: 31.1 G/DL (ref 32–34.5)
MCV RBC AUTO: 94.2 FL (ref 80–99.9)
MONOCYTES ABSOLUTE: 0.95 K/UL (ref 0.1–0.95)
MONOCYTES RELATIVE PERCENT: 9 % (ref 2–12)
NEUTROPHILS ABSOLUTE: 6.65 K/UL (ref 1.8–7.3)
NEUTROPHILS RELATIVE PERCENT: 66 % (ref 43–80)
PDW BLD-RTO: 15.5 % (ref 11.5–15)
PLATELET # BLD: 258 K/UL (ref 130–450)
PMV BLD AUTO: 10.2 FL (ref 7–12)
POTASSIUM SERPL-SCNC: 4.5 MMOL/L (ref 3.5–5)
PRO-BNP: 3189 PG/ML (ref 0–450)
RBC # BLD: 4.47 M/UL (ref 3.5–5.5)
SODIUM BLD-SCNC: 139 MMOL/L (ref 132–146)
TOTAL PROTEIN: 7.6 G/DL (ref 6.4–8.3)
WBC # BLD: 10.1 K/UL (ref 4.5–11.5)

## 2024-01-11 NOTE — TELEPHONE ENCOUNTER
Patient is having cataract Phaco w/ Iol surgery to be done [facility]. Patient needs to hold Eliquis please advise

## 2024-02-21 DIAGNOSIS — I50.32 CHRONIC HEART FAILURE WITH PRESERVED EJECTION FRACTION (HCC): ICD-10-CM

## 2024-02-21 DIAGNOSIS — Z79.01 ANTICOAGULANT LONG-TERM USE: ICD-10-CM

## 2024-02-21 DIAGNOSIS — I10 ESSENTIAL HYPERTENSION: ICD-10-CM

## 2024-02-21 DIAGNOSIS — E78.2 MIXED HYPERLIPIDEMIA: ICD-10-CM

## 2024-02-21 DIAGNOSIS — I50.42 CHRONIC COMBINED SYSTOLIC AND DIASTOLIC HEART FAILURE (HCC): ICD-10-CM

## 2024-02-21 RX ORDER — SIMVASTATIN 40 MG
40 TABLET ORAL NIGHTLY
Qty: 90 TABLET | Refills: 3 | Status: SHIPPED | OUTPATIENT
Start: 2024-02-21

## 2024-02-21 RX ORDER — EZETIMIBE 10 MG/1
TABLET ORAL
Qty: 90 TABLET | Refills: 1 | Status: SHIPPED | OUTPATIENT
Start: 2024-02-21

## 2024-02-21 NOTE — TELEPHONE ENCOUNTER
Medication(s) pended?   [x] Yes  [] No    Last Appointment:  1/10/2024    Future appts:  Future Appointments   Date Time Provider Department Center   3/19/2024  1:00 PM Jorge Luis Key MD [facility]   11/26/2024 11:30 AM Jorge Luis Key MD [facility]

## 2024-03-06 RX ORDER — METOPROLOL TARTRATE 50 MG/1
TABLET, FILM COATED ORAL
Qty: 270 TABLET | Refills: 3 | Status: SHIPPED | OUTPATIENT
Start: 2024-03-06

## 2024-03-06 NOTE — TELEPHONE ENCOUNTER
Last Appointment:  1/10/2024  Future Appointments   Date Time Provider Department Center   3/19/2024  1:00 PM Jorge Luis Key MD [facility]   11/26/2024 11:30 AM Jorge Luis Key MD [facility]

## 2024-03-19 ENCOUNTER — OFFICE VISIT (OUTPATIENT)
Dept: FAMILY MEDICINE CLINIC | Age: 89
End: 2024-03-19

## 2024-03-19 VITALS
HEART RATE: 98 BPM | TEMPERATURE: 98.6 F | BODY MASS INDEX: 30.38 KG/M2 | OXYGEN SATURATION: 99 % | WEIGHT: 194 LBS | SYSTOLIC BLOOD PRESSURE: 124 MMHG | DIASTOLIC BLOOD PRESSURE: 70 MMHG

## 2024-03-19 DIAGNOSIS — I10 ESSENTIAL HYPERTENSION: ICD-10-CM

## 2024-03-19 DIAGNOSIS — I50.42 CHRONIC COMBINED SYSTOLIC AND DIASTOLIC HEART FAILURE (HCC): ICD-10-CM

## 2024-03-19 DIAGNOSIS — Z79.01 ANTICOAGULANT LONG-TERM USE: ICD-10-CM

## 2024-03-19 DIAGNOSIS — E78.2 MIXED HYPERLIPIDEMIA: ICD-10-CM

## 2024-03-19 DIAGNOSIS — I50.32 CHRONIC HEART FAILURE WITH PRESERVED EJECTION FRACTION (HCC): ICD-10-CM

## 2024-03-19 DIAGNOSIS — I10 ESSENTIAL HYPERTENSION: Primary | ICD-10-CM

## 2024-03-19 DIAGNOSIS — E11.9 TYPE 2 DIABETES MELLITUS WITHOUT COMPLICATION, WITHOUT LONG-TERM CURRENT USE OF INSULIN (HCC): ICD-10-CM

## 2024-03-19 DIAGNOSIS — M54.6 THORACIC SPINE PAIN: ICD-10-CM

## 2024-03-19 DIAGNOSIS — M19.90 ARTHRITIS: ICD-10-CM

## 2024-03-19 LAB
ALBUMIN SERPL-MCNC: 4.6 G/DL (ref 3.5–5.2)
ALP BLD-CCNC: 66 U/L (ref 35–104)
ALT SERPL-CCNC: 10 U/L (ref 0–32)
ANION GAP SERPL CALCULATED.3IONS-SCNC: 13 MMOL/L (ref 7–16)
AST SERPL-CCNC: 20 U/L (ref 0–31)
BILIRUB SERPL-MCNC: 0.6 MG/DL (ref 0–1.2)
BUN BLDV-MCNC: 31 MG/DL (ref 6–23)
CALCIUM SERPL-MCNC: 10.1 MG/DL (ref 8.6–10.2)
CHLORIDE BLD-SCNC: 100 MMOL/L (ref 98–107)
CO2: 26 MMOL/L (ref 22–29)
CREAT SERPL-MCNC: 1.3 MG/DL (ref 0.5–1)
CREATININE URINE: 23.4 MG/DL (ref 29–226)
GFR SERPL CREATININE-BSD FRML MDRD: 40 ML/MIN/1.73M2
GLUCOSE BLD-MCNC: 91 MG/DL (ref 74–99)
HBA1C MFR BLD: 5.9 %
MICROALBUMIN/CREAT 24H UR: 15 MG/L (ref 0–19)
MICROALBUMIN/CREAT UR-RTO: 62 MCG/MG CREAT (ref 0–30)
POTASSIUM SERPL-SCNC: 4 MMOL/L (ref 3.5–5)
PRO-BNP: 2427 PG/ML (ref 0–450)
SODIUM BLD-SCNC: 139 MMOL/L (ref 132–146)
TOTAL PROTEIN: 7.5 G/DL (ref 6.4–8.3)

## 2024-03-19 RX ORDER — CELECOXIB 200 MG/1
200 CAPSULE ORAL DAILY
Qty: 90 CAPSULE | Refills: 1 | Status: SHIPPED | OUTPATIENT
Start: 2024-03-19

## 2024-03-19 RX ORDER — ALLOPURINOL 100 MG/1
TABLET ORAL
Qty: 120 TABLET | Refills: 5 | Status: SHIPPED | OUTPATIENT
Start: 2024-03-19

## 2024-03-19 ASSESSMENT — PATIENT HEALTH QUESTIONNAIRE - PHQ9
5. POOR APPETITE OR OVEREATING: NOT AT ALL
7. TROUBLE CONCENTRATING ON THINGS, SUCH AS READING THE NEWSPAPER OR WATCHING TELEVISION: NOT AT ALL
1. LITTLE INTEREST OR PLEASURE IN DOING THINGS: SEVERAL DAYS
3. TROUBLE FALLING OR STAYING ASLEEP: SEVERAL DAYS
SUM OF ALL RESPONSES TO PHQ QUESTIONS 1-9: 5
SUM OF ALL RESPONSES TO PHQ QUESTIONS 1-9: 5
10. IF YOU CHECKED OFF ANY PROBLEMS, HOW DIFFICULT HAVE THESE PROBLEMS MADE IT FOR YOU TO DO YOUR WORK, TAKE CARE OF THINGS AT HOME, OR GET ALONG WITH OTHER PEOPLE: NOT DIFFICULT AT ALL
6. FEELING BAD ABOUT YOURSELF - OR THAT YOU ARE A FAILURE OR HAVE LET YOURSELF OR YOUR FAMILY DOWN: NOT AT ALL
SUM OF ALL RESPONSES TO PHQ QUESTIONS 1-9: 5
9. THOUGHTS THAT YOU WOULD BE BETTER OFF DEAD, OR OF HURTING YOURSELF: NOT AT ALL
SUM OF ALL RESPONSES TO PHQ QUESTIONS 1-9: 5
4. FEELING TIRED OR HAVING LITTLE ENERGY: MORE THAN HALF THE DAYS
SUM OF ALL RESPONSES TO PHQ9 QUESTIONS 1 & 2: 2
2. FEELING DOWN, DEPRESSED OR HOPELESS: SEVERAL DAYS
8. MOVING OR SPEAKING SO SLOWLY THAT OTHER PEOPLE COULD HAVE NOTICED. OR THE OPPOSITE, BEING SO FIGETY OR RESTLESS THAT YOU HAVE BEEN MOVING AROUND A LOT MORE THAN USUAL: NOT AT ALL

## 2024-03-19 ASSESSMENT — ENCOUNTER SYMPTOMS
BACK PAIN: 1
EYES NEGATIVE: 1
ALLERGIC/IMMUNOLOGIC NEGATIVE: 1

## 2024-03-19 NOTE — PROGRESS NOTES
Atrial fibrillation (HCC)     Carotid artery stenosis     Hepatitis A     Hyperlipidemia     Hypertension     Incisional hernia     Spinal stenosis     Thoracic back pain     Thoracic back pain/Scoliosis    Thoracic radiculopathy     Referred to Chris       Past Surgical History:   Procedure Laterality Date    ABDOMINAL AORTIC ANEURYSM REPAIR      [facility]    APPENDECTOMY      CARDIOVERSION  10/28/2014    OTHER SURGICAL HISTORY      epidural injections in spine    SPINE SURGERY  10/2014    TONSILLECTOMY         Social History     Socioeconomic History    Marital status:      Spouse name: Not on file    Number of children: Not on file    Years of education: Not on file    Highest education level: Not on file   Occupational History    Not on file   Tobacco Use    Smoking status: Former     Current packs/day: 0.00     Average packs/day: 1 pack/day for 15.0 years (15.0 ttl pk-yrs)     Types: Cigarettes     Start date: 1975     Quit date: 1990     Years since quittin.3    Smokeless tobacco: Never   Vaping Use    Vaping Use: Never used   Substance and Sexual Activity    Alcohol use: Yes     Comment: rarely    Drug use: No    Sexual activity: Not on file   Other Topics Concern    Not on file   Social History Narrative    Not on file     Social Determinants of Health     Financial Resource Strain: Low Risk  (2022)    Overall Financial Resource Strain (CARDIA)     Difficulty of Paying Living Expenses: Not hard at all   Food Insecurity: No Food Insecurity (2022)    Hunger Vital Sign     Worried About Running Out of Food in the Last Year: Never true     Ran Out of Food in the Last Year: Never true   Transportation Needs: Not on file   Physical Activity: Inactive (2023)    Exercise Vital Sign     Days of Exercise per Week: 0 days     Minutes of Exercise per Session: 0 min   Stress: Not on file   Social Connections: Not on file   Intimate Partner Violence: Not on file   Housing Stability: Not

## 2024-03-20 RX ORDER — NITROFURANTOIN 25; 75 MG/1; MG/1
100 CAPSULE ORAL 2 TIMES DAILY
Qty: 20 CAPSULE | Refills: 0 | Status: SHIPPED | OUTPATIENT
Start: 2024-03-20 | End: 2024-03-30

## 2024-03-26 ENCOUNTER — TELEPHONE (OUTPATIENT)
Dept: FAMILY MEDICINE CLINIC | Age: 89
End: 2024-03-26

## 2024-03-26 NOTE — TELEPHONE ENCOUNTER
Advised Brenda that since she has been on it for several days, it would be ok to just stop it now.

## 2024-03-26 NOTE — TELEPHONE ENCOUNTER
Brenda has had diarrhea since starting the Macrobid.  She still has a couple days to go and asking what you recommend at this time?

## 2024-07-01 DIAGNOSIS — I10 ESSENTIAL HYPERTENSION: ICD-10-CM

## 2024-07-01 DIAGNOSIS — Z79.01 ANTICOAGULANT LONG-TERM USE: ICD-10-CM

## 2024-07-01 DIAGNOSIS — E78.2 MIXED HYPERLIPIDEMIA: ICD-10-CM

## 2024-07-01 DIAGNOSIS — I50.42 CHRONIC COMBINED SYSTOLIC AND DIASTOLIC HEART FAILURE (HCC): ICD-10-CM

## 2024-07-01 DIAGNOSIS — I50.32 CHRONIC HEART FAILURE WITH PRESERVED EJECTION FRACTION (HCC): ICD-10-CM

## 2024-07-01 RX ORDER — BUMETANIDE 2 MG/1
TABLET ORAL
Qty: 135 TABLET | Refills: 1 | Status: SHIPPED | OUTPATIENT
Start: 2024-07-01

## 2024-07-01 NOTE — TELEPHONE ENCOUNTER
Last Appointment:  3/19/2024  Future Appointments   Date Time Provider Department Center   11/26/2024 11:30 AM Jorge Luis Key MD [facility]

## 2024-08-02 DIAGNOSIS — E78.2 MIXED HYPERLIPIDEMIA: ICD-10-CM

## 2024-08-02 DIAGNOSIS — I50.42 CHRONIC COMBINED SYSTOLIC AND DIASTOLIC HEART FAILURE (HCC): ICD-10-CM

## 2024-08-02 DIAGNOSIS — Z79.01 ANTICOAGULANT LONG-TERM USE: ICD-10-CM

## 2024-08-02 DIAGNOSIS — I50.32 CHRONIC HEART FAILURE WITH PRESERVED EJECTION FRACTION (HCC): ICD-10-CM

## 2024-08-02 DIAGNOSIS — I10 ESSENTIAL HYPERTENSION: ICD-10-CM

## 2024-08-02 RX ORDER — CELECOXIB 200 MG/1
200 CAPSULE ORAL DAILY
Qty: 90 CAPSULE | Refills: 1 | Status: SHIPPED | OUTPATIENT
Start: 2024-08-02

## 2024-08-02 RX ORDER — COLCHICINE 0.6 MG/1
0.6 TABLET ORAL DAILY
Qty: 120 TABLET | Refills: 2 | Status: SHIPPED | OUTPATIENT
Start: 2024-08-02

## 2024-08-02 RX ORDER — ALLOPURINOL 100 MG/1
TABLET ORAL
Qty: 120 TABLET | Refills: 5 | Status: SHIPPED | OUTPATIENT
Start: 2024-08-02

## 2024-08-02 RX ORDER — EZETIMIBE 10 MG/1
10 TABLET ORAL DAILY
Qty: 90 TABLET | Refills: 1 | Status: SHIPPED | OUTPATIENT
Start: 2024-08-02

## 2024-08-02 RX ORDER — LISINOPRIL 40 MG/1
40 TABLET ORAL DAILY
Qty: 90 TABLET | Refills: 1 | Status: SHIPPED | OUTPATIENT
Start: 2024-08-02

## 2024-08-02 RX ORDER — FLUTICASONE PROPIONATE 50 MCG
1 SPRAY, SUSPENSION (ML) NASAL PRN
Qty: 1 EACH | Refills: 5 | Status: SHIPPED | OUTPATIENT
Start: 2024-08-02

## 2024-08-02 RX ORDER — DULOXETIN HYDROCHLORIDE 30 MG/1
CAPSULE, DELAYED RELEASE ORAL
Qty: 90 CAPSULE | Refills: 1 | Status: SHIPPED | OUTPATIENT
Start: 2024-08-02

## 2024-08-02 RX ORDER — BUMETANIDE 2 MG/1
TABLET ORAL
Qty: 135 TABLET | Refills: 1 | Status: SHIPPED | OUTPATIENT
Start: 2024-08-02

## 2024-08-02 RX ORDER — METOPROLOL TARTRATE 50 MG/1
TABLET, FILM COATED ORAL
Qty: 270 TABLET | Refills: 3 | Status: SHIPPED | OUTPATIENT
Start: 2024-08-02

## 2024-08-02 RX ORDER — SIMVASTATIN 40 MG
40 TABLET ORAL NIGHTLY
Qty: 90 TABLET | Refills: 3 | Status: SHIPPED | OUTPATIENT
Start: 2024-08-02

## 2024-08-06 DIAGNOSIS — M19.90 ARTHRITIS: ICD-10-CM

## 2024-08-06 RX ORDER — HYDROCODONE BITARTRATE AND ACETAMINOPHEN 5; 325 MG/1; MG/1
1 TABLET ORAL EVERY 6 HOURS PRN
Qty: 120 TABLET | Refills: 0 | Status: SHIPPED | OUTPATIENT
Start: 2024-08-06 | End: 2024-09-05

## 2024-08-06 NOTE — TELEPHONE ENCOUNTER
Patient requesting refill.  Please send to [facility].    Last Appointment:  3/19/2024  Future Appointments   Date Time Provider Department Center   11/26/2024 11:30 AM Jorge Luis Key MD [facility]

## 2024-09-18 ENCOUNTER — OFFICE VISIT (OUTPATIENT)
Dept: FAMILY MEDICINE CLINIC | Age: 89
End: 2024-09-18

## 2024-09-18 VITALS
WEIGHT: 194 LBS | DIASTOLIC BLOOD PRESSURE: 72 MMHG | HEART RATE: 85 BPM | BODY MASS INDEX: 30.38 KG/M2 | OXYGEN SATURATION: 92 % | SYSTOLIC BLOOD PRESSURE: 140 MMHG | TEMPERATURE: 96.9 F
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
## 2024-09-18 DIAGNOSIS — S70.02XA CONTUSION OF LEFT HIP AND THIGH, INITIAL ENCOUNTER: Primary | ICD-10-CM

## 2024-09-18 DIAGNOSIS — S70.12XA CONTUSION OF LEFT HIP AND THIGH, INITIAL ENCOUNTER: Primary | ICD-10-CM

## 2024-10-30 ENCOUNTER — OFFICE VISIT (OUTPATIENT)
Dept: FAMILY MEDICINE CLINIC | Age: 89
End: 2024-10-30

## 2024-10-30 VITALS
WEIGHT: 183 LBS | OXYGEN SATURATION: 99 % | SYSTOLIC BLOOD PRESSURE: 140 MMHG | TEMPERATURE: 98.6 F | HEART RATE: 89 BPM | BODY MASS INDEX: 28.66 KG/M2 | DIASTOLIC BLOOD PRESSURE: 80 MMHG

## 2024-10-30 DIAGNOSIS — E11.22 TYPE 2 DIABETES MELLITUS WITH STAGE 3A CHRONIC KIDNEY DISEASE, WITHOUT LONG-TERM CURRENT USE OF INSULIN (HCC): ICD-10-CM

## 2024-10-30 DIAGNOSIS — E78.2 MIXED HYPERLIPIDEMIA: ICD-10-CM

## 2024-10-30 DIAGNOSIS — N18.31 TYPE 2 DIABETES MELLITUS WITH STAGE 3A CHRONIC KIDNEY DISEASE, WITHOUT LONG-TERM CURRENT USE OF INSULIN (HCC): ICD-10-CM

## 2024-10-30 DIAGNOSIS — I48.0 PAROXYSMAL ATRIAL FIBRILLATION (HCC): ICD-10-CM

## 2024-10-30 DIAGNOSIS — I10 ESSENTIAL HYPERTENSION: ICD-10-CM

## 2024-10-30 DIAGNOSIS — M54.16 LUMBAR RADICULOPATHY: ICD-10-CM

## 2024-10-30 DIAGNOSIS — M54.16 LUMBAR RADICULOPATHY: Primary | ICD-10-CM

## 2024-10-30 PROBLEM — I73.9 VASCULAR CLAUDICATION (HCC): Status: RESOLVED | Noted: 2023-05-12 | Resolved: 2024-10-30

## 2024-10-30 LAB
ALBUMIN: 4 G/DL (ref 3.5–5.2)
ALP BLD-CCNC: 129 U/L (ref 35–104)
ALT SERPL-CCNC: 11 U/L (ref 0–32)
ANION GAP SERPL CALCULATED.3IONS-SCNC: 13 MMOL/L (ref 7–16)
AST SERPL-CCNC: 16 U/L (ref 0–31)
BASOPHILS ABSOLUTE: 0.05 K/UL (ref 0–0.2)
BASOPHILS RELATIVE PERCENT: 1 % (ref 0–2)
BILIRUB SERPL-MCNC: 0.5 MG/DL (ref 0–1.2)
BUN BLDV-MCNC: 31 MG/DL (ref 6–23)
CALCIUM SERPL-MCNC: 10 MG/DL (ref 8.6–10.2)
CHLORIDE BLD-SCNC: 100 MMOL/L (ref 98–107)
CO2: 24 MMOL/L (ref 22–29)
CREAT SERPL-MCNC: 1.2 MG/DL (ref 0.5–1)
EOSINOPHILS ABSOLUTE: 0.36 K/UL (ref 0.05–0.5)
EOSINOPHILS RELATIVE PERCENT: 4 % (ref 0–6)
GFR, ESTIMATED: 42 ML/MIN/1.73M2
GLUCOSE BLD-MCNC: 98 MG/DL (ref 74–99)
HCT VFR BLD CALC: 40 % (ref 34–48)
HEMOGLOBIN: 11.7 G/DL (ref 11.5–15.5)
IMMATURE GRANULOCYTES %: 1 % (ref 0–5)
IMMATURE GRANULOCYTES ABSOLUTE: 0.05 K/UL (ref 0–0.58)
LYMPHOCYTES ABSOLUTE: 1.68 K/UL (ref 1.5–4)
LYMPHOCYTES RELATIVE PERCENT: 18 % (ref 20–42)
MCH RBC QN AUTO: 27.7 PG (ref 26–35)
MCHC RBC AUTO-ENTMCNC: 29.3 G/DL (ref 32–34.5)
MCV RBC AUTO: 94.6 FL (ref 80–99.9)
MONOCYTES ABSOLUTE: 0.74 K/UL (ref 0.1–0.95)
MONOCYTES RELATIVE PERCENT: 8 % (ref 2–12)
NEUTROPHILS ABSOLUTE: 6.73 K/UL (ref 1.8–7.3)
NEUTROPHILS RELATIVE PERCENT: 70 % (ref 43–80)
PDW BLD-RTO: 16.1 % (ref 11.5–15)
PLATELET # BLD: 291 K/UL (ref 130–450)
PMV BLD AUTO: 9.6 FL (ref 7–12)
POTASSIUM SERPL-SCNC: 4.5 MMOL/L (ref 3.5–5)
RBC # BLD: 4.23 M/UL (ref 3.5–5.5)
SODIUM BLD-SCNC: 137 MMOL/L (ref 132–146)
TOTAL PROTEIN: 7.3 G/DL (ref 6.4–8.3)
WBC # BLD: 9.6 K/UL (ref 4.5–11.5)

## 2024-10-30 RX ORDER — PREDNISONE 20 MG/1
20 TABLET ORAL DAILY
Qty: 10 TABLET | Refills: 0 | Status: SHIPPED | OUTPATIENT
Start: 2024-10-30 | End: 2024-11-09

## 2024-10-30 ASSESSMENT — ENCOUNTER SYMPTOMS
ALLERGIC/IMMUNOLOGIC NEGATIVE: 1
BACK PAIN: 1
EYES NEGATIVE: 1
COUGH: 0

## 2024-10-30 NOTE — PROGRESS NOTES
10/30/2024    Brenda Hernandez (:  1935) is a 89 y.o. female, here for evaluation of the following medical concerns:    Patient had an extrinsic fall on Mother's Day and subsequently another extrinsic fall last week.  She fell and injured her left hip.  Prior to that during the summer the patient is describing some lumbar radicular symptoms in the distribution of S1 on the left.  Patient did have lumbar surgery by Dr. Blood back in .  Patient denied any loss of consciousness.  She hit her face but she states that she really did not hit her head and she has not had any headaches since the original fall on Mother's Day.  She denies any focal deficits.  She denies any palpitations.  She is having some teeth extracted tomorrow by Dr. Wong.  Her apixaban has been held for 2 days.  Blood pressure is borderline today.  She is not having orthostatic complaints.      Review of Systems   Constitutional: Negative.    HENT: Negative.     Eyes: Negative.    Respiratory:  Negative for cough.    Cardiovascular:         Atrial fibrillation without evidence of rapid ventricular response.  Improved symptomatology with increased Bumex dosing.   Gastrointestinal:         Denies abdominal pain.   Endocrine:        Diabetes with a hemoglobin A1c of 5.9.   Genitourinary: Negative.    Musculoskeletal:  Positive for back pain.        Thoracic and lumbar spine pain.  Recent ablative procedure.  Pain seems to be improved at this point.   Allergic/Immunologic: Negative.    Neurological: Negative.    Hematological: Negative.    Psychiatric/Behavioral:          Improved depression with the use of Cymbalta.  Current stressors regarding move.     Problem List: Malaise and fatigue, Essential hypertension, Hyperlipidemia  Health Maintenance:  Colonoscopy - (2014)  Bone Density Test Screening - (2009)  Couseled on Home Safety - (10/12/2015)  Influenza Vaccination - (2020)  Tdap - (2017) DISCUSSED  Zoster/Shingles

## 2024-10-31 DIAGNOSIS — I50.32 CHRONIC HEART FAILURE WITH PRESERVED EJECTION FRACTION (HCC): ICD-10-CM

## 2024-10-31 DIAGNOSIS — E78.2 MIXED HYPERLIPIDEMIA: ICD-10-CM

## 2024-10-31 DIAGNOSIS — I50.42 CHRONIC COMBINED SYSTOLIC AND DIASTOLIC HEART FAILURE (HCC): ICD-10-CM

## 2024-10-31 DIAGNOSIS — Z79.01 ANTICOAGULANT LONG-TERM USE: ICD-10-CM

## 2024-10-31 DIAGNOSIS — I10 ESSENTIAL HYPERTENSION: ICD-10-CM

## 2024-10-31 RX ORDER — DULOXETIN HYDROCHLORIDE 30 MG/1
CAPSULE, DELAYED RELEASE ORAL
Qty: 90 CAPSULE | Refills: 1 | OUTPATIENT
Start: 2024-10-31

## 2024-11-26 ENCOUNTER — CLINICAL DOCUMENTATION (OUTPATIENT)
Dept: FAMILY MEDICINE CLINIC | Age: 89
End: 2024-11-26

## 2024-11-26 ENCOUNTER — OFFICE VISIT (OUTPATIENT)
Dept: FAMILY MEDICINE CLINIC | Age: 89
End: 2024-11-26

## 2024-11-26 VITALS
WEIGHT: 181 LBS | BODY MASS INDEX: 28.35 KG/M2 | TEMPERATURE: 98.9 F | OXYGEN SATURATION: 100 % | SYSTOLIC BLOOD PRESSURE: 120 MMHG | HEART RATE: 92 BPM | DIASTOLIC BLOOD PRESSURE: 60 MMHG

## 2024-11-26 DIAGNOSIS — M54.16 LUMBAR RADICULOPATHY: ICD-10-CM

## 2024-11-26 DIAGNOSIS — G89.29 CHRONIC PAIN OF LEFT KNEE: ICD-10-CM

## 2024-11-26 DIAGNOSIS — N18.31 TYPE 2 DIABETES MELLITUS WITH STAGE 3A CHRONIC KIDNEY DISEASE, WITHOUT LONG-TERM CURRENT USE OF INSULIN (HCC): ICD-10-CM

## 2024-11-26 DIAGNOSIS — I50.42 CHRONIC COMBINED SYSTOLIC AND DIASTOLIC HEART FAILURE (HCC): Primary | ICD-10-CM

## 2024-11-26 DIAGNOSIS — Z00.00 MEDICARE ANNUAL WELLNESS VISIT, SUBSEQUENT: Primary | ICD-10-CM

## 2024-11-26 DIAGNOSIS — I10 ESSENTIAL HYPERTENSION: ICD-10-CM

## 2024-11-26 DIAGNOSIS — M25.562 CHRONIC PAIN OF LEFT KNEE: ICD-10-CM

## 2024-11-26 DIAGNOSIS — Z00.00 ENCOUNTER FOR SUBSEQUENT ANNUAL WELLNESS VISIT IN MEDICARE PATIENT: ICD-10-CM

## 2024-11-26 DIAGNOSIS — E11.22 TYPE 2 DIABETES MELLITUS WITH STAGE 3A CHRONIC KIDNEY DISEASE, WITHOUT LONG-TERM CURRENT USE OF INSULIN (HCC): ICD-10-CM

## 2024-11-26 DIAGNOSIS — E78.2 MIXED HYPERLIPIDEMIA: ICD-10-CM

## 2024-11-26 RX ORDER — DULOXETIN HYDROCHLORIDE 60 MG/1
60 CAPSULE, DELAYED RELEASE ORAL DAILY
Qty: 90 CAPSULE | Refills: 1 | Status: SHIPPED | OUTPATIENT
Start: 2024-11-26

## 2024-11-26 RX ORDER — TRIAMCINOLONE ACETONIDE 40 MG/ML
40 INJECTION, SUSPENSION INTRA-ARTICULAR; INTRAMUSCULAR ONCE
Status: COMPLETED | OUTPATIENT
Start: 2024-11-26 | End: 2024-11-26

## 2024-11-26 RX ADMIN — TRIAMCINOLONE ACETONIDE 40 MG: 40 INJECTION, SUSPENSION INTRA-ARTICULAR; INTRAMUSCULAR at 17:40

## 2024-11-26 ASSESSMENT — PATIENT HEALTH QUESTIONNAIRE - PHQ9
SUM OF ALL RESPONSES TO PHQ9 QUESTIONS 1 & 2: 1
1. LITTLE INTEREST OR PLEASURE IN DOING THINGS: NOT AT ALL
SUM OF ALL RESPONSES TO PHQ QUESTIONS 1-9: 1
7. TROUBLE CONCENTRATING ON THINGS, SUCH AS READING THE NEWSPAPER OR WATCHING TELEVISION: NOT AT ALL
SUM OF ALL RESPONSES TO PHQ QUESTIONS 1-9: 1
8. MOVING OR SPEAKING SO SLOWLY THAT OTHER PEOPLE COULD HAVE NOTICED. OR THE OPPOSITE, BEING SO FIGETY OR RESTLESS THAT YOU HAVE BEEN MOVING AROUND A LOT MORE THAN USUAL: NOT AT ALL
3. TROUBLE FALLING OR STAYING ASLEEP: NOT AT ALL
6. FEELING BAD ABOUT YOURSELF - OR THAT YOU ARE A FAILURE OR HAVE LET YOURSELF OR YOUR FAMILY DOWN: NOT AT ALL
5. POOR APPETITE OR OVEREATING: NOT AT ALL
9. THOUGHTS THAT YOU WOULD BE BETTER OFF DEAD, OR OF HURTING YOURSELF: NOT AT ALL
10. IF YOU CHECKED OFF ANY PROBLEMS, HOW DIFFICULT HAVE THESE PROBLEMS MADE IT FOR YOU TO DO YOUR WORK, TAKE CARE OF THINGS AT HOME, OR GET ALONG WITH OTHER PEOPLE: NOT DIFFICULT AT ALL
4. FEELING TIRED OR HAVING LITTLE ENERGY: NOT AT ALL
SUM OF ALL RESPONSES TO PHQ QUESTIONS 1-9: 1
2. FEELING DOWN, DEPRESSED OR HOPELESS: SEVERAL DAYS
SUM OF ALL RESPONSES TO PHQ QUESTIONS 1-9: 1

## 2024-11-26 ASSESSMENT — ENCOUNTER SYMPTOMS
COUGH: 0
EYES NEGATIVE: 1
BACK PAIN: 1
ALLERGIC/IMMUNOLOGIC NEGATIVE: 1

## 2024-11-26 NOTE — PROGRESS NOTES
cannot take nonsteroidals.  She does have Vicodin as a backup.    After informed consent, under sterile conditions, after negative aspiration, 1 cc of Kenalog 1 cc of lidocaine injected into the left knee using the lateral suprapatellar approach.  Postinjection instructions are given.  The patient tolerated this well    The patient is referred to pain management for lumbar pain.  She is not a surgical candidate

## 2024-11-26 NOTE — PROGRESS NOTES
Medicare Annual Wellness Visit    Brenda Hernandez is here for Medicare AWV    Assessment & Plan     Recommendations for Preventive Services Due: see orders and patient instructions/AVS.  Recommended screening schedule for the next 5-10 years is provided to the patient in written form: see Patient Instructions/AVS.     No follow-ups on file.     Subjective       Patient's complete Health Risk Assessment and screening values have been reviewed and are found in Flowsheets. The following problems were reviewed today and where indicated follow up appointments were made and/or referrals ordered.    Positive Risk Factor Screenings with Interventions:    Fall Risk:  Do you feel unsteady or are you worried about falling? : (!) yes  2 or more falls in past year?: (!) yes  Fall with injury in past year?: (!) yes     Interventions:    Reviewed medications, home hazards, visual acuity, and co-morbidities that can increase risk for falls            General HRA Questions:  Select all that apply: (!) New or Increased Pain    Interventions - Pain:  Patient advised to follow up in the office for further evaluation and treatment      Inactivity:  On average, how many days per week do you engage in moderate to strenuous exercise (like a brisk walk)?: 0 days (!) Abnormal  On average, how many minutes do you engage in exercise at this level?: 0 min    Interventions:  Patient advised to follow up in the office for further evaluation and treatment       Hearing Screen:  Do you or your family notice any trouble with your hearing that hasn't been managed with hearing aids?: (!) Yes    Interventions:  Patient declines any further evaluation or treatment                     Objective   There were no vitals filed for this visit.   There is no height or weight on file to calculate BMI.                    Allergies   Allergen Reactions    Amoxicillin-Pot Clavulanate Diarrhea    Crestor [Rosuvastatin] Other (See Comments)     Muscle fatigue

## 2024-11-26 NOTE — PATIENT INSTRUCTIONS
alcohol or drug use, talk to your doctor.   Medicines    Take your medicines exactly as prescribed. Call your doctor if you think you are having a problem with your medicine.     If your doctor recommends aspirin, take the amount directed each day. Make sure you take aspirin and not another kind of pain reliever, such as acetaminophen (Tylenol).   When should you call for help?   Call 911 if you have symptoms of a heart attack. These may include:    Chest pain or pressure, or a strange feeling in the chest.     Sweating.     Shortness of breath.     Pain, pressure, or a strange feeling in the back, neck, jaw, or upper belly or in one or both shoulders or arms.     Lightheadedness or sudden weakness.     A fast or irregular heartbeat.   After you call 911, the  may tell you to chew 1 adult-strength or 2 to 4 low-dose aspirin. Wait for an ambulance. Do not try to drive yourself.  Watch closely for changes in your health, and be sure to contact your doctor if you have any problems.  Where can you learn more?  Go to https://www.[employer].net/patientEd and enter F075 to learn more about \"A Healthy Heart: Care Instructions.\"  Current as of: June 24, 2023  Content Version: 14.2  © 2024 [employer].   Care instructions adapted under license by [employer]. If you have questions about a medical condition or this instruction, always ask your healthcare professional. [employer] disclaims any warranty or liability for your use of this information.      Personalized Preventive Plan for Brenda Hernandez - 11/26/2024  Medicare offers a range of preventive health benefits. Some of the tests and screenings are paid in full while other may be subject to a deductible, co-insurance, and/or copay.    Some of these benefits include a comprehensive review of your medical history including lifestyle, illnesses that may run in your family, and various assessments and screenings as appropriate.    After

## 2024-12-09 ENCOUNTER — OFFICE VISIT (OUTPATIENT)
Dept: PAIN MANAGEMENT | Age: 89
End: 2024-12-09
Payer: MEDICARE

## 2024-12-09 VITALS
WEIGHT: 181 LBS | HEIGHT: 68 IN | TEMPERATURE: 97.9 F | DIASTOLIC BLOOD PRESSURE: 72 MMHG | OXYGEN SATURATION: 96 % | HEART RATE: 67 BPM | BODY MASS INDEX: 27.43 KG/M2 | SYSTOLIC BLOOD PRESSURE: 109 MMHG | RESPIRATION RATE: 16 BRPM

## 2024-12-09 DIAGNOSIS — G89.29 CHRONIC BILATERAL LOW BACK PAIN WITH LEFT-SIDED SCIATICA: ICD-10-CM

## 2024-12-09 DIAGNOSIS — M16.0 BILATERAL PRIMARY OSTEOARTHRITIS OF HIP: ICD-10-CM

## 2024-12-09 DIAGNOSIS — M47.816 LUMBAR SPONDYLOSIS: Primary | ICD-10-CM

## 2024-12-09 DIAGNOSIS — M41.26 OTHER IDIOPATHIC SCOLIOSIS, LUMBAR REGION: ICD-10-CM

## 2024-12-09 DIAGNOSIS — M54.42 CHRONIC BILATERAL LOW BACK PAIN WITH LEFT-SIDED SCIATICA: ICD-10-CM

## 2024-12-09 DIAGNOSIS — M96.1 FAILED BACK SURGICAL SYNDROME: ICD-10-CM

## 2024-12-09 PROCEDURE — 1090F PRES/ABSN URINE INCON ASSESS: CPT | Performed by: STUDENT IN AN ORGANIZED HEALTH CARE EDUCATION/TRAINING PROGRAM

## 2024-12-09 PROCEDURE — 1036F TOBACCO NON-USER: CPT | Performed by: STUDENT IN AN ORGANIZED HEALTH CARE EDUCATION/TRAINING PROGRAM

## 2024-12-09 PROCEDURE — G8484 FLU IMMUNIZE NO ADMIN: HCPCS | Performed by: STUDENT IN AN ORGANIZED HEALTH CARE EDUCATION/TRAINING PROGRAM

## 2024-12-09 PROCEDURE — 1123F ACP DISCUSS/DSCN MKR DOCD: CPT | Performed by: STUDENT IN AN ORGANIZED HEALTH CARE EDUCATION/TRAINING PROGRAM

## 2024-12-09 PROCEDURE — G8417 CALC BMI ABV UP PARAM F/U: HCPCS | Performed by: STUDENT IN AN ORGANIZED HEALTH CARE EDUCATION/TRAINING PROGRAM

## 2024-12-09 PROCEDURE — 99204 OFFICE O/P NEW MOD 45 MIN: CPT | Performed by: STUDENT IN AN ORGANIZED HEALTH CARE EDUCATION/TRAINING PROGRAM

## 2024-12-09 PROCEDURE — 1160F RVW MEDS BY RX/DR IN RCRD: CPT | Performed by: STUDENT IN AN ORGANIZED HEALTH CARE EDUCATION/TRAINING PROGRAM

## 2024-12-09 PROCEDURE — 1159F MED LIST DOCD IN RCRD: CPT | Performed by: STUDENT IN AN ORGANIZED HEALTH CARE EDUCATION/TRAINING PROGRAM

## 2024-12-09 PROCEDURE — G8427 DOCREV CUR MEDS BY ELIG CLIN: HCPCS | Performed by: STUDENT IN AN ORGANIZED HEALTH CARE EDUCATION/TRAINING PROGRAM

## 2024-12-09 RX ORDER — HYDROCODONE BITARTRATE AND ACETAMINOPHEN 5; 325 MG/1; MG/1
1 TABLET ORAL EVERY 6 HOURS PRN
COMMUNITY

## 2024-12-09 NOTE — PROGRESS NOTES
Patient:  Brenda Hernandez,  1935  Date of Service:  24      Patient presents to [facility] with complaints of low back pain     Pain:  Location: low back  Inciting Factor: n/a  Duration: 7 months  Description: intermittent  Quality: aching and dull.    Level (worst): 7  Radiation:  yes - left leg  Numbness/Tingling: yes - left leg  Aggravating factors: walking, standing.   Alleviating factors: heat and rest.    Blood Thinners/Anticoagulation: Eliquis   If so, managed by: Jorge Luis Key MD    Herbal Supplements: no    Medications:    NSAID's :   no  Tylenol: Yes non effective  Patches/Gels:   no  Membrane stabilizers :   Current -  no  Previous - no  Opioids :   Current -  hydrocodone/acetaminophen (Hycet, Lorcet, Lortab, Norco, Vicodin)   Previous -   hydrocodone/acetaminophen (Hycet, Lorcet, Lortab, Norco, Vicodin)   Muscle Relaxants:   no  Steroids: no   Benzodiazepines:    Anti-depres/Anti-Pscyh: Duloxetine (CYMBALTA)   Adjuvants or Others : no      Previous treatments:    Physical Therapy : Yes  in PT now  Chiropractic treatment: Yes 6 months ago  TENS Unit: No   Previous Pain Physicians: no   Previous Procedure: yes, back sx 10 yrs ago     Do you want someone present when the provider examines you? No    /72   Pulse 67   Temp 97.9 °F (36.6 °C) (Infrared)   Resp 16   Ht 1.727 m (5' 8\")   Wt 82.1 kg (181 lb)   SpO2 96%   BMI 27.52 kg/m²     No LMP recorded. Patient is postmenopausal.  
js    Impression:    Assessment:    ICD-10-CM    1. Lumbar spondylosis  M47.816 MRI LUMBAR SPINE WO CONTRAST      2. Other idiopathic scoliosis, lumbar region  M41.26 MRI LUMBAR SPINE WO CONTRAST      3. Bilateral primary osteoarthritis of hip  M16.0       4. Chronic bilateral low back pain with left-sided sciatica  M54.42 MRI LUMBAR SPINE WO CONTRAST    G89.29       5. 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 female with h/o   AAA, Afib on Eliquis,  Carotid artery stenosis, Hepatitis A, HLD, HTN, Spinal stenosis, scoliosis who p/w chronic low back and left leg pain.  Patient was previously under the care of [facility] pain management and did have possible MYLENE and RFA in the past.  Patient also was seen by Dr. Blood in the [facility] for surgery and had possible L3-L5 discectomy and cyst removal in 2014    Previous treatments included Tylenol, Norco, Cymbalta, PT, chiropractic therapy, MYLENE, RFA, back surgery.    Imaging and studies were reviewed with patient including XR hip shows moderate to severe hip OA.  XR L-spine shows severe scoliosis as well as multilevel degenerative changes.  XR T-spine shows multilevel degenerative changes.  MRI L-spine 2021 shows possible scar tissue near the exiting left L4 and L5 nerve roots as well as scoliosis.  Also noted is mild to moderate ventrolateral canal stenosis from L2-S1 with multilevel NF stenosis at these levels.      Differential diagnosis includes (but is not limited to)  Lumbar Radiculopathy, Lumbar Spinal Stenosis, Lumbar Spondylosis, Myofascial Pain Syndrome, Hip O/A, failed back surgery syndrome, scoliosis   .     Our treatment plan will start with HEP as the patient has completed PT for her low back.  We will obtain MRI L-spine to rule out neurogenic pathology given previous findings and symptoms.  We we will also obtain XR T-spine as well for eval of her scoliosis.  We will obtain records from [facility] pain management to better understand

## 2025-01-10 ENCOUNTER — HOSPITAL ENCOUNTER (OUTPATIENT)
Dept: MRI IMAGING | Age: 89
Discharge: HOME | End: 2025-01-12
Attending: STUDENT IN AN ORGANIZED HEALTH CARE EDUCATION/TRAINING PROGRAM
Payer: MEDICARE

## 2025-01-10 DIAGNOSIS — M41.26 OTHER IDIOPATHIC SCOLIOSIS, LUMBAR REGION: ICD-10-CM

## 2025-01-10 DIAGNOSIS — M54.42 CHRONIC BILATERAL LOW BACK PAIN WITH LEFT-SIDED SCIATICA: ICD-10-CM

## 2025-01-10 DIAGNOSIS — M47.816 LUMBAR SPONDYLOSIS: ICD-10-CM

## 2025-01-10 DIAGNOSIS — G89.29 CHRONIC BILATERAL LOW BACK PAIN WITH LEFT-SIDED SCIATICA: ICD-10-CM

## 2025-01-10 PROCEDURE — 72148 MRI LUMBAR SPINE W/O DYE: CPT

## 2025-01-27 ENCOUNTER — OFFICE VISIT (OUTPATIENT)
Dept: PAIN MANAGEMENT | Age: 89
End: 2025-01-27
Payer: MEDICARE

## 2025-01-27 VITALS
OXYGEN SATURATION: 97 % | WEIGHT: 181 LBS | HEART RATE: 93 BPM | BODY MASS INDEX: 28.41 KG/M2 | DIASTOLIC BLOOD PRESSURE: 67 MMHG | HEIGHT: 67 IN | SYSTOLIC BLOOD PRESSURE: 129 MMHG | TEMPERATURE: 98.2 F | RESPIRATION RATE: 16 BRPM

## 2025-01-27 DIAGNOSIS — M54.42 CHRONIC BILATERAL LOW BACK PAIN WITH LEFT-SIDED SCIATICA: ICD-10-CM

## 2025-01-27 DIAGNOSIS — M47.816 LUMBAR SPONDYLOSIS: ICD-10-CM

## 2025-01-27 DIAGNOSIS — M96.1 FAILED BACK SURGICAL SYNDROME: ICD-10-CM

## 2025-01-27 DIAGNOSIS — M16.0 BILATERAL PRIMARY OSTEOARTHRITIS OF HIP: ICD-10-CM

## 2025-01-27 DIAGNOSIS — M48.062 SPINAL STENOSIS OF LUMBAR REGION WITH NEUROGENIC CLAUDICATION: Primary | ICD-10-CM

## 2025-01-27 DIAGNOSIS — M41.26 OTHER IDIOPATHIC SCOLIOSIS, LUMBAR REGION: ICD-10-CM

## 2025-01-27 DIAGNOSIS — G89.29 CHRONIC BILATERAL LOW BACK PAIN WITH LEFT-SIDED SCIATICA: ICD-10-CM

## 2025-01-27 PROCEDURE — 99214 OFFICE O/P EST MOD 30 MIN: CPT | Performed by: STUDENT IN AN ORGANIZED HEALTH CARE EDUCATION/TRAINING PROGRAM

## 2025-01-27 PROCEDURE — 1090F PRES/ABSN URINE INCON ASSESS: CPT | Performed by: STUDENT IN AN ORGANIZED HEALTH CARE EDUCATION/TRAINING PROGRAM

## 2025-01-27 PROCEDURE — 1123F ACP DISCUSS/DSCN MKR DOCD: CPT | Performed by: STUDENT IN AN ORGANIZED HEALTH CARE EDUCATION/TRAINING PROGRAM

## 2025-01-27 PROCEDURE — 1159F MED LIST DOCD IN RCRD: CPT | Performed by: STUDENT IN AN ORGANIZED HEALTH CARE EDUCATION/TRAINING PROGRAM

## 2025-01-27 PROCEDURE — G8417 CALC BMI ABV UP PARAM F/U: HCPCS | Performed by: STUDENT IN AN ORGANIZED HEALTH CARE EDUCATION/TRAINING PROGRAM

## 2025-01-27 PROCEDURE — 1160F RVW MEDS BY RX/DR IN RCRD: CPT | Performed by: STUDENT IN AN ORGANIZED HEALTH CARE EDUCATION/TRAINING PROGRAM

## 2025-01-27 PROCEDURE — G8427 DOCREV CUR MEDS BY ELIG CLIN: HCPCS | Performed by: STUDENT IN AN ORGANIZED HEALTH CARE EDUCATION/TRAINING PROGRAM

## 2025-01-27 PROCEDURE — 1036F TOBACCO NON-USER: CPT | Performed by: STUDENT IN AN ORGANIZED HEALTH CARE EDUCATION/TRAINING PROGRAM

## 2025-01-27 RX ORDER — SODIUM CHLORIDE 0.9 % (FLUSH) 0.9 %
5-40 SYRINGE (ML) INJECTION PRN
OUTPATIENT
Start: 2025-01-27

## 2025-01-27 RX ORDER — SODIUM CHLORIDE 9 MG/ML
INJECTION, SOLUTION INTRAVENOUS PRN
OUTPATIENT
Start: 2025-01-27

## 2025-01-27 RX ORDER — SODIUM CHLORIDE 0.9 % (FLUSH) 0.9 %
5-40 SYRINGE (ML) INJECTION EVERY 12 HOURS SCHEDULED
OUTPATIENT
Start: 2025-01-27

## 2025-01-27 NOTE — PROGRESS NOTES
[facility] - Pain Medicine  [address] [address]    Pain Medicine Follow Up Note      Brenda Hernandez     Date of Visit:  1/27/2025    CC:  Patient presents for follow up   Chief Complaint   Patient presents with    Follow-up     Lower back       HPI:    Pain is unchanged.  Medication side effects:none.   Recent interventional procedures..  Blood Thinners/Anticoagulation: Eliquis  Herbal Supplements: no  Pertinent Allergies: no  Diabetic: no  Bowel/Bladder Incontinence: no    Previous Plan:  HEP-ongoing  XR T-spine-done  MRI L-spine-done  [facility]-still not obtained    Interval Changes:  Has been feeling better in regards to her leg symptoms although still with ongoing back pain    Procedures:        Previous Treatments:    Tylenol, Norco, Cymbalta, PT, chiropractic therapy, MYLENE, RFA, back surgery.     Potential Aberrant Drug-Related Behavior:  no      Imaging/Studies:      XRAY:  XR KNEE LEFT (MIN 4 VIEWS) 11/26/2024   Mild degenerative changes involving the medial and patellofemoral  compartments.        XR HIP BILATERAL W AP PELVIS (2 VIEWS) 10/30/2024   Moderate right and severe left hip joint degenerative changes.        XR LUMBAR SPINE (MIN 4 VIEWS) 10/30/2024   Lumbar vertebral bodies are normal in height and alignment.   No evidence of  fracture. Visualized sacrum is unremarkable.     Significant multilevel degenerative changes with scoliosis.  Bilateral hip  joint degenerative changes.  Colonic fecal retention.     Impression  Significant multilevel degenerative changes with scoliosis.        XR HIP LEFT (2-3 VIEWS) 09/18/2024     The hip demonstrates normal alignment. No evidence of acute fracture.  No  focal osseus lesion.   Pelvis is intact.  Mild osteoarthritis.     Impression  No acute abnormality of the hip.        XR THORACIC SPINE (3 VIEWS) 07/18/2023     Thoracic vertebral bodies are normal in height and alignment.  Multilevel  degenerative

## 2025-01-27 NOTE — PROGRESS NOTES
Brenda Hernandez presents to the [facility] on 1/27/2025. Brenda is complaining of pain low back. The pain is constant. The pain is described as aching. Pain is rated on her best day at a 5, on her worst day at a 9, and on average at a 7 on the VAS scale. She took her last dose of Norco yesterday.     Any procedures since your last visit: No,     Pacemaker or defibrillator: No     She is not on NSAIDS and is  on anticoagulation medications to include Eliquis and is managed by Jorge Luis Key MD.  .     Do you want someone present when the provider examines you? No    Medication Contract and Consent for Opioid Use Documents Filed        No documents found                    /67   Pulse 93   Temp 98.2 °F (36.8 °C) (Infrared)   Resp 16   Ht 1.702 m (5' 7\")   Wt 82.1 kg (181 lb)   SpO2 97%   BMI 28.35 kg/m²      No LMP recorded. Patient is postmenopausal.

## 2025-01-28 ENCOUNTER — TELEPHONE (OUTPATIENT)
Dept: PAIN MANAGEMENT | Age: 89
End: 2025-01-28

## 2025-01-28 NOTE — TELEPHONE ENCOUNTER
Brenda is scheduled to have an upcoming Lumbar Epidural Steroid Injection with Dr. Wiley. Would it be acceptable for patient to hold Eliquis 3 days prior to procedure? Please advise. Thank you

## 2025-01-29 ENCOUNTER — OFFICE VISIT (OUTPATIENT)
Dept: FAMILY MEDICINE CLINIC | Age: 89
End: 2025-01-29

## 2025-01-29 VITALS
TEMPERATURE: 97.6 F | DIASTOLIC BLOOD PRESSURE: 80 MMHG | OXYGEN SATURATION: 99 % | HEART RATE: 98 BPM | WEIGHT: 180 LBS | SYSTOLIC BLOOD PRESSURE: 140 MMHG | BODY MASS INDEX: 28.19 KG/M2

## 2025-01-29 DIAGNOSIS — E11.22 TYPE 2 DIABETES MELLITUS WITH STAGE 3A CHRONIC KIDNEY DISEASE, WITHOUT LONG-TERM CURRENT USE OF INSULIN (HCC): ICD-10-CM

## 2025-01-29 DIAGNOSIS — R80.9 MICROALBUMINURIA: ICD-10-CM

## 2025-01-29 DIAGNOSIS — I10 ESSENTIAL HYPERTENSION: ICD-10-CM

## 2025-01-29 DIAGNOSIS — I50.42 CHRONIC COMBINED SYSTOLIC AND DIASTOLIC HEART FAILURE (HCC): ICD-10-CM

## 2025-01-29 DIAGNOSIS — I50.32 CHRONIC HEART FAILURE WITH PRESERVED EJECTION FRACTION (HCC): ICD-10-CM

## 2025-01-29 DIAGNOSIS — J30.1 NON-SEASONAL ALLERGIC RHINITIS DUE TO POLLEN: Primary | ICD-10-CM

## 2025-01-29 DIAGNOSIS — Z79.01 ANTICOAGULANT LONG-TERM USE: ICD-10-CM

## 2025-01-29 DIAGNOSIS — E78.2 MIXED HYPERLIPIDEMIA: ICD-10-CM

## 2025-01-29 DIAGNOSIS — I48.0 PAROXYSMAL ATRIAL FIBRILLATION (HCC): ICD-10-CM

## 2025-01-29 DIAGNOSIS — N18.31 TYPE 2 DIABETES MELLITUS WITH STAGE 3A CHRONIC KIDNEY DISEASE, WITHOUT LONG-TERM CURRENT USE OF INSULIN (HCC): ICD-10-CM

## 2025-01-29 LAB
ALBUMIN: 4.4 G/DL (ref 3.5–5.2)
ALP BLD-CCNC: 67 U/L (ref 35–104)
ALT SERPL-CCNC: 13 U/L (ref 0–32)
ANION GAP SERPL CALCULATED.3IONS-SCNC: 13 MMOL/L (ref 7–16)
AST SERPL-CCNC: 21 U/L (ref 0–31)
BILIRUB SERPL-MCNC: 0.6 MG/DL (ref 0–1.2)
BUN BLDV-MCNC: 28 MG/DL (ref 6–23)
CALCIUM SERPL-MCNC: 9.9 MG/DL (ref 8.6–10.2)
CHLORIDE BLD-SCNC: 107 MMOL/L (ref 98–107)
CHOLESTEROL, TOTAL: 146 MG/DL
CO2: 25 MMOL/L (ref 22–29)
CREAT SERPL-MCNC: 1.2 MG/DL (ref 0.5–1)
CREATININE URINE: 25.8 MG/DL (ref 29–226)
GFR, ESTIMATED: 45 ML/MIN/1.73M2
GLUCOSE BLD-MCNC: 97 MG/DL (ref 74–99)
HBA1C MFR BLD: 6 % (ref 4–5.6)
HDLC SERPL-MCNC: 36 MG/DL
LDL CHOLESTEROL: 89 MG/DL
MICROALBUMIN/CREAT 24H UR: 13 MG/L (ref 0–19)
MICROALBUMIN/CREAT UR-RTO: 51 MCG/MG CREAT (ref 0–30)
POTASSIUM SERPL-SCNC: 5 MMOL/L (ref 3.5–5)
SODIUM BLD-SCNC: 145 MMOL/L (ref 132–146)
TOTAL PROTEIN: 7.3 G/DL (ref 6.4–8.3)
TRIGL SERPL-MCNC: 104 MG/DL
VLDLC SERPL CALC-MCNC: 21 MG/DL

## 2025-01-29 RX ORDER — LISINOPRIL 40 MG/1
40 TABLET ORAL DAILY
Qty: 90 TABLET | Refills: 1 | Status: SHIPPED | OUTPATIENT
Start: 2025-01-29

## 2025-01-29 RX ORDER — AZELASTINE 1 MG/ML
1 SPRAY, METERED NASAL 2 TIMES DAILY
Qty: 30 ML | Refills: 5 | Status: SHIPPED | OUTPATIENT
Start: 2025-01-29

## 2025-01-29 RX ORDER — METOPROLOL TARTRATE 50 MG
TABLET ORAL
Qty: 270 TABLET | Refills: 3 | Status: SHIPPED | OUTPATIENT
Start: 2025-01-29

## 2025-01-29 RX ORDER — SIMVASTATIN 40 MG
40 TABLET ORAL NIGHTLY
Qty: 90 TABLET | Refills: 3 | Status: SHIPPED | OUTPATIENT
Start: 2025-01-29

## 2025-01-29 RX ORDER — EZETIMIBE 10 MG/1
10 TABLET ORAL DAILY
Qty: 90 TABLET | Refills: 1 | Status: SHIPPED | OUTPATIENT
Start: 2025-01-29

## 2025-01-29 ASSESSMENT — ENCOUNTER SYMPTOMS
ALLERGIC/IMMUNOLOGIC NEGATIVE: 1
BACK PAIN: 1
EYES NEGATIVE: 1
COUGH: 1

## 2025-01-29 NOTE — PROGRESS NOTES
2025    Brenda Hernandez (:  1935) is a 89 y.o. female, here for evaluation of the following medical concerns:  The patient is being evaluated by pain management for epidural injections.  It appears that it probably be an L3/L4 area as she does have severe neuroforaminal stenosis at that level.  Patient has not had any recent falls.  I did review x-rays of her knees.  This shows fairly advanced osteoarthritic changes especially in the right knee.  Fortunately for her she really has no discomfort in those knees currently.  She is denying cardiac symptomatology other than some slight increase in shortness of breath which has been very gradually increasing over the years.  She denies orthopnea or PND.  She does have to sleep in a slanted position because of her back.  She denies any increase in peripheral lymphedema.  She has had no strokelike symptoms.  Denies any bleeding issues despite the apixaban.  The patient denies any GI complaints including black stools blood in the stools.  She denies any dyspepsia.  She is complaining of some postnasal drip.  This has not responded to Flonase.      Review of Systems   Constitutional: Negative.    HENT:  Positive for congestion.    Eyes: Negative.    Respiratory:  Positive for cough.         Occasional cough of clear sputum in the morning which resolves quickly.   Cardiovascular:         Atrial fibrillation without evidence of rapid ventricular response.  Improved symptomatology with increased Bumex dosing.   Gastrointestinal:         Denies abdominal pain.   Endocrine:        Diabetes with a hemoglobin A1c of 5.9.   Genitourinary: Negative.    Musculoskeletal:  Positive for back pain.        Thoracic and lumbar spine pain.  Current evaluation by pain management with epidural injection planned.   Allergic/Immunologic: Negative.    Neurological: Negative.    Hematological: Negative.    Psychiatric/Behavioral:          Improved depression with the use of

## 2025-02-14 ENCOUNTER — TELEPHONE (OUTPATIENT)
Dept: PAIN MANAGEMENT | Age: 89
End: 2025-02-14

## 2025-02-14 DIAGNOSIS — M54.16 LUMBAR RADICULOPATHY: ICD-10-CM

## 2025-02-14 NOTE — TELEPHONE ENCOUNTER
Call to Brenda Hernandez that procedure was scheduled for 02/21/2025 and that [facility] should call her a few days before for the pre op call and between 2:00 PM and 4:00 PM  the business day before with the arrival time. Instructed Brenda to hold ibuprofen for 24 hours, Celebrex, Mobic, and naprosyn for 4 days and any aspirin containing products, CoQ 10, or fish oil for 7 days. Instructed to call office back if any questions. Brenda verbalized understanding.    **Instructed to hold Eliquis 3 days prior to procedure per med clearance in media**    Electronically signed by Linda Paiz RN on 2/14/2025 at 10:49 AM

## 2025-02-18 NOTE — PROGRESS NOTES
[facility] PAIN MANAGEMENT  INSTRUCTIONS  ...........................................................................................................................................    [x] Parking the day of Surgery is located in the Main Entrance lot.  Upon entering the door, make immediate right into the surgery reception room    [x]  Bring photo ID and insurance card    [x] You may have a light breakfast day of procedure    [x]  Wear loose comfortable clothing    [x]  Please follow instructions for medications as given per your doctors office    [x] You can expect a call the business day prior to procedure between 2PM and 4PM to notify you of your arrival time.       [x] Please arrange for     ALL QUESTIONS ANSWERED AT THIS TIME.

## 2025-02-21 ENCOUNTER — HOSPITAL ENCOUNTER (OUTPATIENT)
Age: 89
Setting detail: OUTPATIENT SURGERY
Discharge: HOME | End: 2025-02-21
Attending: STUDENT IN AN ORGANIZED HEALTH CARE EDUCATION/TRAINING PROGRAM | Admitting: STUDENT IN AN ORGANIZED HEALTH CARE EDUCATION/TRAINING PROGRAM
Payer: MEDICARE

## 2025-02-21 ENCOUNTER — HOSPITAL ENCOUNTER (OUTPATIENT)
Dept: GENERAL RADIOLOGY | Age: 89
Discharge: HOME | End: 2025-02-23
Attending: STUDENT IN AN ORGANIZED HEALTH CARE EDUCATION/TRAINING PROGRAM
Payer: MEDICARE

## 2025-02-21 VITALS
DIASTOLIC BLOOD PRESSURE: 93 MMHG | HEIGHT: 67 IN | TEMPERATURE: 97.9 F | WEIGHT: 191 LBS | HEART RATE: 86 BPM | OXYGEN SATURATION: 95 % | SYSTOLIC BLOOD PRESSURE: 156 MMHG | BODY MASS INDEX: 29.98 KG/M2 | RESPIRATION RATE: 18 BRPM

## 2025-02-21 DIAGNOSIS — R52 PAIN: ICD-10-CM

## 2025-02-21 PROCEDURE — 7100000010 HC PHASE II RECOVERY - FIRST 15 MIN: Performed by: STUDENT IN AN ORGANIZED HEALTH CARE EDUCATION/TRAINING PROGRAM

## 2025-02-21 PROCEDURE — 7100000011 HC PHASE II RECOVERY - ADDTL 15 MIN: Performed by: STUDENT IN AN ORGANIZED HEALTH CARE EDUCATION/TRAINING PROGRAM

## 2025-02-21 PROCEDURE — 6360000002 HC RX W HCPCS: Performed by: STUDENT IN AN ORGANIZED HEALTH CARE EDUCATION/TRAINING PROGRAM

## 2025-02-21 PROCEDURE — 3600000002 HC SURGERY LEVEL 2 BASE: Performed by: STUDENT IN AN ORGANIZED HEALTH CARE EDUCATION/TRAINING PROGRAM

## 2025-02-21 PROCEDURE — C1713 ANCHOR/SCREW BN/BN,TIS/BN: HCPCS | Performed by: STUDENT IN AN ORGANIZED HEALTH CARE EDUCATION/TRAINING PROGRAM

## 2025-02-21 PROCEDURE — 62323 NJX INTERLAMINAR LMBR/SAC: CPT | Performed by: STUDENT IN AN ORGANIZED HEALTH CARE EDUCATION/TRAINING PROGRAM

## 2025-02-21 PROCEDURE — 3600000012 HC SURGERY LEVEL 2 ADDTL 15MIN: Performed by: STUDENT IN AN ORGANIZED HEALTH CARE EDUCATION/TRAINING PROGRAM

## 2025-02-21 PROCEDURE — 6360000004 HC RX CONTRAST MEDICATION: Performed by: STUDENT IN AN ORGANIZED HEALTH CARE EDUCATION/TRAINING PROGRAM

## 2025-02-21 PROCEDURE — 2709999900 HC NON-CHARGEABLE SUPPLY: Performed by: STUDENT IN AN ORGANIZED HEALTH CARE EDUCATION/TRAINING PROGRAM

## 2025-02-21 RX ORDER — LIDOCAINE HYDROCHLORIDE 5 MG/ML
INJECTION, SOLUTION INFILTRATION; INTRAVENOUS PRN
Status: DISCONTINUED | OUTPATIENT
Start: 2025-02-21 | End: 2025-02-21 | Stop reason: ALTCHOICE

## 2025-02-21 RX ORDER — SODIUM CHLORIDE 0.9 % (FLUSH) 0.9 %
5-40 SYRINGE (ML) INJECTION PRN
Status: DISCONTINUED | OUTPATIENT
Start: 2025-02-21 | End: 2025-02-21 | Stop reason: HOSPADM

## 2025-02-21 RX ORDER — IOPAMIDOL 612 MG/ML
INJECTION, SOLUTION INTRATHECAL PRN
Status: DISCONTINUED | OUTPATIENT
Start: 2025-02-21 | End: 2025-02-21 | Stop reason: ALTCHOICE

## 2025-02-21 RX ORDER — SODIUM CHLORIDE 9 MG/ML
INJECTION, SOLUTION INTRAVENOUS PRN
Status: DISCONTINUED | OUTPATIENT
Start: 2025-02-21 | End: 2025-02-21 | Stop reason: HOSPADM

## 2025-02-21 RX ORDER — SODIUM CHLORIDE 0.9 % (FLUSH) 0.9 %
5-40 SYRINGE (ML) INJECTION EVERY 12 HOURS SCHEDULED
Status: DISCONTINUED | OUTPATIENT
Start: 2025-02-21 | End: 2025-02-21 | Stop reason: HOSPADM

## 2025-02-21 RX ORDER — METHYLPREDNISOLONE ACETATE 40 MG/ML
INJECTION, SUSPENSION INTRA-ARTICULAR; INTRALESIONAL; INTRAMUSCULAR; SOFT TISSUE PRN
Status: DISCONTINUED | OUTPATIENT
Start: 2025-02-21 | End: 2025-02-21 | Stop reason: ALTCHOICE

## 2025-02-21 ASSESSMENT — PAIN - FUNCTIONAL ASSESSMENT
PAIN_FUNCTIONAL_ASSESSMENT: NONE - DENIES PAIN
PAIN_FUNCTIONAL_ASSESSMENT: PREVENTS OR INTERFERES SOME ACTIVE ACTIVITIES AND ADLS
PAIN_FUNCTIONAL_ASSESSMENT: NONE - DENIES PAIN
PAIN_FUNCTIONAL_ASSESSMENT: 0-10

## 2025-02-21 NOTE — PROGRESS NOTES
Discharge instructions gone over, follow up discussed.  Pt verbalized understanding of discharge instructions, all questions answered. Pt has a .  Pt discharged home with family via WC with staff.

## 2025-02-21 NOTE — DISCHARGE INSTRUCTIONS
[facility] Pain Management Department  [place]-[phone]  Dr. Hilda Ba   Post-Pain Block/Radiofrequency  Home Going Instructions    1-Go home, rest for the remainder of the day  2-Please do not lift over 20 pounds the day of the injection  3-If you received sedation No: alcohol, driving, operating lawn mowers, plows, tractors or other dangerous equipment until next morning. Do not make important decisions or sign legal documents for 24 hours. You may experience light headedness, dizziness, nausea or sleepiness after sedation. Do not stay alone. A responsible adult must be with you for 24 hours. You could be nauseated from the medications you have received. Your IV site may be sore and bruised.    4-No dietary restrictions     5-Resume all medications the same day, blood thinners to be resumed 24 hours after injection if you were instructed to stop any.    6-Keep the surgical site clean and dry, you may shower the next morning and remove the      dressing.     7- No sitz baths, tub baths or hot tubs/swimming for 24 hours.       8- If you have any pain at the injection site(s), application of an ice pack to the area should be       helpful, 20 minutes on/20 minutes off for next 48 hours.  9- Call [facility] Pain Management immediately at if you develop.  Fever greater than 100.4 F  Have bleeding or drainage from the puncture site  Have progressive Leg/arm numbness and or weakness  Loss of control of bowel and or bladder (wet/soil yourself)  Severe headache with inability to lift head  10-You may return to work the next day

## 2025-02-21 NOTE — H&P
[facility]  Pain Medicine  [address] [place] [address]    Procedure History & Physical      Brenda Hernandez     HPI:    Patient  is here with  CLBP , LE  pain for L2/3 MYLENE  Labs/imaging studies reviewed   All question and concerns addressed including R/B/A associated with the procedure    Past Medical History:   Diagnosis Date    AAA (abdominal aortic aneurysm)     Anticoagulant long-term use     Atrial fibrillation (HCC)     Carotid artery stenosis     Hepatitis A     Hyperlipidemia     Hypertension     Incisional hernia     Spinal stenosis     Thoracic back pain     Thoracic back pain/Scoliosis    Thoracic radiculopathy     Referred to Chris       Past Surgical History:   Procedure Laterality Date    ABDOMINAL AORTIC ANEURYSM REPAIR  2005    [facility]    APPENDECTOMY      CARDIOVERSION  10/28/2014    OTHER SURGICAL HISTORY      epidural injections in spine    SPINE SURGERY  10/2014    TONSILLECTOMY         Prior to Admission medications    Medication Sig Start Date End Date Taking? Authorizing Provider   apixaban (ELIQUIS) 5 MG TABS tablet take 1 tablet by mouth twice a day 1/29/25  Yes Jorge Luis Key MD   ezetimibe (ZETIA) 10 MG tablet Take 1 tablet by mouth daily 1/29/25   Jorge Luis Key MD   lisinopril (PRINIVIL;ZESTRIL) 40 MG tablet Take 1 tablet by mouth daily 1/29/25   Jorge Luis Key MD   metoprolol tartrate (LOPRESSOR) 50 MG tablet take 1 and 1/2 tablets by mouth twice a day 1/29/25   Jorge Luis Key MD   simvastatin (ZOCOR) 40 MG tablet Take 1 tablet by mouth nightly 1/29/25   Jorge Luis Key MD   azelastine (ASTELIN) 0.1 % nasal spray 1 spray by Nasal route 2 times daily Use in each nostril as directed 1/29/25   Jorge Luis Key MD   HYDROcodone-acetaminophen (NORCO) 5-325 MG per tablet Take 1 tablet by mouth every 6 hours as needed for Pain.    Provider, MD Candace   DULoxetine (CYMBALTA) 60 MG extended release capsule Take 1 capsule by mouth daily 11/26/24

## 2025-02-21 NOTE — OP NOTE
2025    Patient: Brenda Hernandez  :  1935  Age:  89 y.o.  Sex:  female     PRE-OPERATIVE DIAGNOSIS:  Spinal stenosis with neurogenic claudication.    POST-OPERATIVE DIAGNOSIS: Same.    PROCEDURE: Fluoroscopic guided therapeutic lumbar epidural steroid injection at the L2/3 level converted to Caudal epidural steroid injection with RACZ catheter    SURGEON:  Luiza Wiley MD    ANESTHESIA: Local    ESTIMATED BLOOD LOSS: None.  ______________________________________________________________________    BRIEF HISTORY:  Brenda Hernandez comes in today for first lumbar epidural injection at L2/3 level. The potential complications of this procedure were discussed with her again today.  She has elected to undergo the aforementioned procedure.    Brenda’s complete History & Physical examination were reviewed in depth, a copy of which is in the chart.      DESCRIPTION OF PROCEDURE:    After confirming written and informed consent, a time-out was performed and Brenda’s name and date of birth, the procedure to be performed as well as the plan for the location of the needle insertion were confirmed.    The patient was brought into the procedure room and placed in the prone position on the fluoroscopy table. A pillow was placed under the patient's lower abdomen/upper pelvis to increase lumbar interlaminar space. Standard monitors were placed, and vital signs were observed throughout the procedure. The area of the lumbar spine was prepped with chloraprep and draped in a sterile manner. The L2/3 interspace was identified and marked under AP fluoroscopy. The skin and subcutaneous tissues at the above level were anesthestized with 0.5% lidocaine. With intermittent fluoroscopy, an 18 gauge 3.5 \" tuohy epidural needle was inserted and directed toward the interlaminar space. The needle was slowly advanced using loss of resistance technique and 5 cc glass syringe. After various attempts and redirection, we were unable to reach

## 2025-03-18 DIAGNOSIS — Z79.01 ANTICOAGULANT LONG-TERM USE: ICD-10-CM

## 2025-03-18 DIAGNOSIS — I50.32 CHRONIC HEART FAILURE WITH PRESERVED EJECTION FRACTION (HCC): ICD-10-CM

## 2025-03-18 DIAGNOSIS — E78.2 MIXED HYPERLIPIDEMIA: ICD-10-CM

## 2025-03-18 DIAGNOSIS — I50.42 CHRONIC COMBINED SYSTOLIC AND DIASTOLIC HEART FAILURE (HCC): ICD-10-CM

## 2025-03-18 DIAGNOSIS — I10 ESSENTIAL HYPERTENSION: ICD-10-CM

## 2025-03-18 RX ORDER — SIMVASTATIN 40 MG
40 TABLET ORAL NIGHTLY
Qty: 90 TABLET | Refills: 3 | Status: SHIPPED | OUTPATIENT
Start: 2025-03-18

## 2025-03-18 RX ORDER — METOPROLOL TARTRATE 50 MG
TABLET ORAL
Qty: 270 TABLET | Refills: 3 | Status: SHIPPED | OUTPATIENT
Start: 2025-03-18

## 2025-03-18 RX ORDER — EZETIMIBE 10 MG/1
10 TABLET ORAL DAILY
Qty: 90 TABLET | Refills: 1 | Status: SHIPPED | OUTPATIENT
Start: 2025-03-18

## 2025-03-18 RX ORDER — LISINOPRIL 40 MG/1
40 TABLET ORAL DAILY
Qty: 90 TABLET | Refills: 1 | Status: SHIPPED | OUTPATIENT
Start: 2025-03-18

## 2025-03-18 RX ORDER — DULOXETIN HYDROCHLORIDE 60 MG/1
60 CAPSULE, DELAYED RELEASE ORAL DAILY
Qty: 90 CAPSULE | Refills: 1 | Status: SHIPPED | OUTPATIENT
Start: 2025-03-18

## 2025-03-18 RX ORDER — BUMETANIDE 2 MG/1
2 TABLET ORAL DAILY
Qty: 90 TABLET | Refills: 1 | Status: SHIPPED | OUTPATIENT
Start: 2025-03-18

## 2025-03-18 RX ORDER — AZELASTINE 1 MG/ML
1 SPRAY, METERED NASAL 2 TIMES DAILY
Qty: 30 ML | Refills: 5 | Status: SHIPPED | OUTPATIENT
Start: 2025-03-18

## 2025-03-18 NOTE — TELEPHONE ENCOUNTER
Patient call into the office today wanting all of her medication sent back over to [facility] in [place].     Patient said that she is not impressed with [facility] in [place].     Patient said they do not call her on any of her medications.     All medication pending ready to be send to [facility].

## 2025-03-21 ENCOUNTER — OFFICE VISIT (OUTPATIENT)
Dept: PAIN MANAGEMENT | Age: 89
End: 2025-03-21
Payer: MEDICARE

## 2025-03-21 VITALS
OXYGEN SATURATION: 98 % | HEART RATE: 85 BPM | HEIGHT: 67 IN | SYSTOLIC BLOOD PRESSURE: 145 MMHG | DIASTOLIC BLOOD PRESSURE: 85 MMHG | TEMPERATURE: 97.9 F | WEIGHT: 191 LBS | BODY MASS INDEX: 29.98 KG/M2 | RESPIRATION RATE: 16 BRPM

## 2025-03-21 DIAGNOSIS — M41.26 OTHER IDIOPATHIC SCOLIOSIS, LUMBAR REGION: ICD-10-CM

## 2025-03-21 DIAGNOSIS — M48.062 SPINAL STENOSIS OF LUMBAR REGION WITH NEUROGENIC CLAUDICATION: ICD-10-CM

## 2025-03-21 DIAGNOSIS — M47.816 LUMBAR SPONDYLOSIS: ICD-10-CM

## 2025-03-21 DIAGNOSIS — M54.16 LUMBAR RADICULOPATHY: Primary | ICD-10-CM

## 2025-03-21 PROCEDURE — 1159F MED LIST DOCD IN RCRD: CPT | Performed by: STUDENT IN AN ORGANIZED HEALTH CARE EDUCATION/TRAINING PROGRAM

## 2025-03-21 PROCEDURE — G8427 DOCREV CUR MEDS BY ELIG CLIN: HCPCS | Performed by: STUDENT IN AN ORGANIZED HEALTH CARE EDUCATION/TRAINING PROGRAM

## 2025-03-21 PROCEDURE — G8417 CALC BMI ABV UP PARAM F/U: HCPCS | Performed by: STUDENT IN AN ORGANIZED HEALTH CARE EDUCATION/TRAINING PROGRAM

## 2025-03-21 PROCEDURE — 99213 OFFICE O/P EST LOW 20 MIN: CPT | Performed by: STUDENT IN AN ORGANIZED HEALTH CARE EDUCATION/TRAINING PROGRAM

## 2025-03-21 PROCEDURE — 1123F ACP DISCUSS/DSCN MKR DOCD: CPT | Performed by: STUDENT IN AN ORGANIZED HEALTH CARE EDUCATION/TRAINING PROGRAM

## 2025-03-21 PROCEDURE — 1090F PRES/ABSN URINE INCON ASSESS: CPT | Performed by: STUDENT IN AN ORGANIZED HEALTH CARE EDUCATION/TRAINING PROGRAM

## 2025-03-21 PROCEDURE — 1036F TOBACCO NON-USER: CPT | Performed by: STUDENT IN AN ORGANIZED HEALTH CARE EDUCATION/TRAINING PROGRAM

## 2025-03-21 NOTE — PROGRESS NOTES
Brenda Hernandez presents to the [facility] on 3/21/2025. Brenda is complaining of pain lower back. The pain is intermittent. The pain is described as aching. Pain is rated on her best day at a 7, on her worst day at a 10, and on average at a 9 on the VAS scale. She took her last dose of Norco yesterday.     Any procedures since your last visit: Yes,     Pacemaker or defibrillator: No     She is not on NSAIDS and is  on anticoagulation medications to include Eliquis and is managed by Jorge Luis Key MD..     Do you want someone present when the provider examines you? Annita's blood pressure was elevated today. She was instructed to contact his primary care provider as soon as possible.    Medication Contract and Consent for Opioid Use Documents Filed        No documents found                    BP (!) 145/85   Pulse 85   Temp 97.9 °F (36.6 °C) (Infrared)   Resp 16   Ht 1.702 m (5' 7\")   Wt 86.6 kg (191 lb)   SpO2 98%   BMI 29.91 kg/m²      No LMP recorded. Patient is postmenopausal.  
to stay active and to watch/lose weight   Encouraged to continue Regular home exercise program as tolerated - stretching / strengthening.   Smoking cessation counseling : no   Treatment plan discussed with the patient including medication and procedure side effects, as well as benefits and alternatives and the patient expressed understanding.     E&M Medical Decision Making / Time Spent:  Classification: 67778 - Low Medical Decision Making and/or 20-29 min     MDM Complexity of Problems: Moderate - 1 or more chronic illnesses with exacerbation, progression, or side effects of treatment, 2 or more stable chronic illnesses, 1 undiagnosed new problem with uncertain prognosis, 1 acute illness with systemic symptoms, 1 acute complicated injury  MDM Complexity of Data/Analysis:   Cat 1:Review of prior external note(s) from each unique source   Cat 2:n/a   Cat 3:n/a  MDM Complexity of Management: Low- OTC medications, PT/OT    This classification was based on a combination of time spent as well as medical complexity as per above, which included preparing to see the patient, face-to-face patient care, completing clinical documentation, obtaining and/or reviewing separately obtained history, performing a medically appropriate exam, independently reviewing and interpreting imaging and studies, counseling and educating the patient/family/caregiver and ordering medications, tests, or procedures.        NOTE: The above documentation was prepared using voice recognition software.  Every attempt was made to ensure accuracy but there may be spelling, grammatical, and contextual errors.    Luiza Wiley MD    Controlled Substances Monitoring:        No data to display                [place] report reviewed today  CC:     No referring provider defined for this encounter.   Jorge Luis Key MD   [address] [phone]

## 2025-04-28 DIAGNOSIS — M19.90 ARTHRITIS: ICD-10-CM

## 2025-04-28 DIAGNOSIS — Z79.01 ANTICOAGULANT LONG-TERM USE: Primary | ICD-10-CM

## 2025-04-28 RX ORDER — HYDROCODONE BITARTRATE AND ACETAMINOPHEN 5; 325 MG/1; MG/1
1 TABLET ORAL EVERY 6 HOURS PRN
Qty: 120 TABLET | Refills: 0 | Status: SHIPPED | OUTPATIENT
Start: 2025-04-28 | End: 2025-05-28

## 2025-04-28 NOTE — TELEPHONE ENCOUNTER
Last Appointment:  1/29/2025  Future Appointments   Date Time Provider Department Center   6/4/2025 10:30 AM Jorge Luis Key MD [facility] DE[facility]

## 2025-06-04 ENCOUNTER — OFFICE VISIT (OUTPATIENT)
Dept: FAMILY MEDICINE CLINIC | Age: 89
End: 2025-06-04

## 2025-06-04 VITALS
WEIGHT: 179 LBS | DIASTOLIC BLOOD PRESSURE: 70 MMHG | SYSTOLIC BLOOD PRESSURE: 126 MMHG | HEIGHT: 67 IN | BODY MASS INDEX: 28.09 KG/M2 | HEART RATE: 78 BPM | TEMPERATURE: 97.1 F | RESPIRATION RATE: 16 BRPM | OXYGEN SATURATION: 97 %

## 2025-06-04 DIAGNOSIS — E78.2 MIXED HYPERLIPIDEMIA: ICD-10-CM

## 2025-06-04 DIAGNOSIS — F32.9 REACTIVE DEPRESSION: ICD-10-CM

## 2025-06-04 DIAGNOSIS — I50.42 CHRONIC COMBINED SYSTOLIC AND DIASTOLIC HEART FAILURE (HCC): ICD-10-CM

## 2025-06-04 DIAGNOSIS — E11.9 TYPE 2 DIABETES MELLITUS WITHOUT COMPLICATION, WITHOUT LONG-TERM CURRENT USE OF INSULIN (HCC): ICD-10-CM

## 2025-06-04 DIAGNOSIS — Z79.01 ANTICOAGULANT LONG-TERM USE: ICD-10-CM

## 2025-06-04 DIAGNOSIS — E11.9 TYPE 2 DIABETES MELLITUS WITHOUT COMPLICATION, WITHOUT LONG-TERM CURRENT USE OF INSULIN (HCC): Primary | ICD-10-CM

## 2025-06-04 DIAGNOSIS — M19.90 ARTHRITIS: ICD-10-CM

## 2025-06-04 DIAGNOSIS — I50.32 CHRONIC HEART FAILURE WITH PRESERVED EJECTION FRACTION (HCC): ICD-10-CM

## 2025-06-04 DIAGNOSIS — M54.16 LUMBAR RADICULOPATHY: ICD-10-CM

## 2025-06-04 DIAGNOSIS — I10 ESSENTIAL HYPERTENSION: ICD-10-CM

## 2025-06-04 LAB
ALBUMIN: 4.3 G/DL (ref 3.5–5.2)
ALP BLD-CCNC: 55 U/L (ref 35–104)
ALT SERPL-CCNC: 16 U/L (ref 0–32)
ANION GAP SERPL CALCULATED.3IONS-SCNC: 14 MMOL/L (ref 7–16)
AST SERPL-CCNC: 17 U/L (ref 0–31)
BASOPHILS ABSOLUTE: 0.05 K/UL (ref 0–0.2)
BASOPHILS RELATIVE PERCENT: 1 % (ref 0–2)
BILIRUB SERPL-MCNC: 0.6 MG/DL (ref 0–1.2)
BUN BLDV-MCNC: 24 MG/DL (ref 6–23)
CALCIUM SERPL-MCNC: 9.8 MG/DL (ref 8.6–10.2)
CHLORIDE BLD-SCNC: 105 MMOL/L (ref 98–107)
CHOLESTEROL, TOTAL: 134 MG/DL
CO2: 22 MMOL/L (ref 22–29)
CREAT SERPL-MCNC: 1 MG/DL (ref 0.5–1)
EOSINOPHILS ABSOLUTE: 0.41 K/UL (ref 0.05–0.5)
EOSINOPHILS RELATIVE PERCENT: 4 % (ref 0–6)
GFR, ESTIMATED: 52 ML/MIN/1.73M2
GLUCOSE BLD-MCNC: 89 MG/DL (ref 74–99)
HBA1C MFR BLD: 6.5 % (ref 4–5.6)
HCT VFR BLD CALC: 44.2 % (ref 34–48)
HDLC SERPL-MCNC: 39 MG/DL
HEMOGLOBIN: 13.3 G/DL (ref 11.5–15.5)
IMMATURE GRANULOCYTES %: 0 % (ref 0–5)
IMMATURE GRANULOCYTES ABSOLUTE: <0.03 K/UL (ref 0–0.58)
LDL CHOLESTEROL: 72 MG/DL
LYMPHOCYTES ABSOLUTE: 2.4 K/UL (ref 1.5–4)
LYMPHOCYTES RELATIVE PERCENT: 25 % (ref 20–42)
MCH RBC QN AUTO: 27.7 PG (ref 26–35)
MCHC RBC AUTO-ENTMCNC: 30.1 G/DL (ref 32–34.5)
MCV RBC AUTO: 91.9 FL (ref 80–99.9)
MONOCYTES ABSOLUTE: 0.99 K/UL (ref 0.1–0.95)
MONOCYTES RELATIVE PERCENT: 10 % (ref 2–12)
NEUTROPHILS ABSOLUTE: 5.69 K/UL (ref 1.8–7.3)
NEUTROPHILS RELATIVE PERCENT: 60 % (ref 43–80)
PDW BLD-RTO: 16.8 % (ref 11.5–15)
PLATELET # BLD: 219 K/UL (ref 130–450)
PMV BLD AUTO: 10.6 FL (ref 7–12)
POTASSIUM SERPL-SCNC: 5.1 MMOL/L (ref 3.5–5)
RBC # BLD: 4.81 M/UL (ref 3.5–5.5)
SODIUM BLD-SCNC: 141 MMOL/L (ref 132–146)
TOTAL PROTEIN: 7.2 G/DL (ref 6.4–8.3)
TRIGL SERPL-MCNC: 116 MG/DL
VLDLC SERPL CALC-MCNC: 23 MG/DL
WBC # BLD: 9.6 K/UL (ref 4.5–11.5)

## 2025-06-04 RX ORDER — SIMVASTATIN 40 MG
40 TABLET ORAL NIGHTLY
Qty: 90 TABLET | Refills: 3 | Status: SHIPPED | OUTPATIENT
Start: 2025-06-04

## 2025-06-04 RX ORDER — BUMETANIDE 2 MG/1
2 TABLET ORAL DAILY
Qty: 90 TABLET | Refills: 1 | Status: SHIPPED | OUTPATIENT
Start: 2025-06-04

## 2025-06-04 RX ORDER — HYDROCODONE BITARTRATE AND ACETAMINOPHEN 5; 325 MG/1; MG/1
1 TABLET ORAL EVERY 6 HOURS PRN
COMMUNITY

## 2025-06-04 RX ORDER — DULOXETIN HYDROCHLORIDE 60 MG/1
60 CAPSULE, DELAYED RELEASE ORAL DAILY
Qty: 90 CAPSULE | Refills: 1 | Status: SHIPPED | OUTPATIENT
Start: 2025-06-04

## 2025-06-04 RX ORDER — EZETIMIBE 10 MG/1
10 TABLET ORAL DAILY
Qty: 90 TABLET | Refills: 1 | Status: SHIPPED | OUTPATIENT
Start: 2025-06-04

## 2025-06-04 RX ORDER — METOPROLOL TARTRATE 50 MG
TABLET ORAL
Qty: 270 TABLET | Refills: 3 | Status: SHIPPED | OUTPATIENT
Start: 2025-06-04

## 2025-06-04 RX ORDER — LISINOPRIL 40 MG/1
40 TABLET ORAL DAILY
Qty: 90 TABLET | Refills: 1 | Status: SHIPPED | OUTPATIENT
Start: 2025-06-04

## 2025-06-04 ASSESSMENT — PATIENT HEALTH QUESTIONNAIRE - PHQ9
SUM OF ALL RESPONSES TO PHQ QUESTIONS 1-9: 8
7. TROUBLE CONCENTRATING ON THINGS, SUCH AS READING THE NEWSPAPER OR WATCHING TELEVISION: NOT AT ALL
9. THOUGHTS THAT YOU WOULD BE BETTER OFF DEAD, OR OF HURTING YOURSELF: NOT AT ALL
4. FEELING TIRED OR HAVING LITTLE ENERGY: NEARLY EVERY DAY
5. POOR APPETITE OR OVEREATING: NOT AT ALL
10. IF YOU CHECKED OFF ANY PROBLEMS, HOW DIFFICULT HAVE THESE PROBLEMS MADE IT FOR YOU TO DO YOUR WORK, TAKE CARE OF THINGS AT HOME, OR GET ALONG WITH OTHER PEOPLE: NOT DIFFICULT AT ALL
SUM OF ALL RESPONSES TO PHQ QUESTIONS 1-9: 8
8. MOVING OR SPEAKING SO SLOWLY THAT OTHER PEOPLE COULD HAVE NOTICED. OR THE OPPOSITE, BEING SO FIGETY OR RESTLESS THAT YOU HAVE BEEN MOVING AROUND A LOT MORE THAN USUAL: NOT AT ALL
3. TROUBLE FALLING OR STAYING ASLEEP: NEARLY EVERY DAY
SUM OF ALL RESPONSES TO PHQ QUESTIONS 1-9: 8
SUM OF ALL RESPONSES TO PHQ QUESTIONS 1-9: 8
6. FEELING BAD ABOUT YOURSELF - OR THAT YOU ARE A FAILURE OR HAVE LET YOURSELF OR YOUR FAMILY DOWN: SEVERAL DAYS
2. FEELING DOWN, DEPRESSED OR HOPELESS: SEVERAL DAYS
1. LITTLE INTEREST OR PLEASURE IN DOING THINGS: NOT AT ALL

## 2025-06-04 ASSESSMENT — ENCOUNTER SYMPTOMS
BACK PAIN: 1
ALLERGIC/IMMUNOLOGIC NEGATIVE: 1
COUGH: 0
EYES NEGATIVE: 1

## 2025-06-04 NOTE — PROGRESS NOTES
2025    Brenda Hernandez (:  1935) is a 89 y.o. female, here for evaluation of the following medical concerns:  Patient has not had symptoms consistent with congestive heart failure.  She has had no cough or sputum production.  She denies fever, chills, sweats.  She is in atrial fibrillation but there has not been evidence of that rapid ventricular response.  Bumex is keeping her clinically euvolemic.  Multiple orthopedic issues including her lumbar spine hips and knees.  She did see pain management.  She did receive several injection therapies but without much help.  She was referred to spine deformity clinic at the [facility] but she did not go.  We did discuss this today.  She does have multiple comorbidities but I do not think insurmountable comorbidities in terms of going to surgery.  She would need full evaluations by cardiology etc. to do this.  I also discussed with her possible referral to [facility] as the family is somewhat [place] centric.      Review of Systems   Constitutional: Negative.    HENT:  Negative for congestion.    Eyes: Negative.    Respiratory:  Negative for cough.         Occasional cough of clear sputum in the morning which resolves quickly.   Cardiovascular:         Atrial fibrillation without evidence of rapid ventricular response.  Improved symptomatology with increased Bumex dosing.   Gastrointestinal:         Denies abdominal pain.   Endocrine:        Diabetes with a hemoglobin A1c of 5.9.   Genitourinary: Negative.    Musculoskeletal:  Positive for back pain.        Thoracic and lumbar spine pain.  Recent referral by pain management to [facility]   Allergic/Immunologic: Negative.    Neurological: Negative.    Hematological: Negative.    Psychiatric/Behavioral:          Improved depression with the use of Cymbalta.  Symptoms continue to be improved after move.     Problem List: Malaise and fatigue, Essential hypertension, Hyperlipidemia  Health

## 2025-06-05 ENCOUNTER — RESULTS FOLLOW-UP (OUTPATIENT)
Dept: PRIMARY CARE CLINIC | Age: 89
End: 2025-06-05

## (undated) DEVICE — SYRINGE MED 5ML STD CLR PLAS LUERLOCK TIP N CTRL DISP

## (undated) DEVICE — Device: Brand: PORTEX

## (undated) DEVICE — GAUZE,SPONGE,4"X4",8PLY,STRL,LF,10/TRAY: Brand: MEDLINE

## (undated) DEVICE — NEEDLE HYPO 18GA L1.5IN PNK POLYPR HUB S STL REG BVL STR

## (undated) DEVICE — E-KATH 19G X 12": Brand: EPIMED

## (undated) DEVICE — PENCIL POINT NEEDLE, BLACK: Brand: RELI

## (undated) DEVICE — BANDAGE ADH W1XL3IN NAT FAB WVN FLX DURABLE N ADH PD SEAL

## (undated) DEVICE — 12 ML SYRINGE,LUER-LOCK TIP: Brand: MONOJECT

## (undated) DEVICE — NON-DEHP CATHETER EXTENSION SET, MALE LUER LOCK ADAPTER

## (undated) DEVICE — GLOVE ORANGE PI 7 1/2   MSG9075

## (undated) DEVICE — 3M™ RED DOT™ MONITORING ELECTRODE WITH FOAM TAPE AND STICKY GEL 2560, 50/BAG, 20/CASE, 72/PLT: Brand: RED DOT™

## (undated) DEVICE — NEEDLE HYPO 25GA L1.5IN BLU POLYPR HUB S STL REG BVL STR

## (undated) DEVICE — 6 ML SYRINGE LUER-LOCK TIP: Brand: MONOJECT

## (undated) DEVICE — RX COUDÉ® EPIDURAL NEEDLE 16G TW X 3.5": Brand: EPIMED